# Patient Record
Sex: MALE | Race: BLACK OR AFRICAN AMERICAN | Employment: OTHER | URBAN - METROPOLITAN AREA
[De-identification: names, ages, dates, MRNs, and addresses within clinical notes are randomized per-mention and may not be internally consistent; named-entity substitution may affect disease eponyms.]

---

## 2017-10-24 ENCOUNTER — OFFICE VISIT (OUTPATIENT)
Dept: INTERNAL MEDICINE CLINIC | Facility: CLINIC | Age: 62
End: 2017-10-24

## 2017-10-24 VITALS
WEIGHT: 267 LBS | RESPIRATION RATE: 18 BRPM | HEIGHT: 78 IN | BODY MASS INDEX: 30.89 KG/M2 | DIASTOLIC BLOOD PRESSURE: 78 MMHG | TEMPERATURE: 98 F | SYSTOLIC BLOOD PRESSURE: 136 MMHG | HEART RATE: 69 BPM

## 2017-10-24 DIAGNOSIS — Z00.00 ENCOUNTER FOR MEDICAL EXAMINATION TO ESTABLISH CARE: ICD-10-CM

## 2017-10-24 DIAGNOSIS — R97.20 ELEVATED PSA: ICD-10-CM

## 2017-10-24 DIAGNOSIS — Z99.89 OSA ON CPAP: ICD-10-CM

## 2017-10-24 DIAGNOSIS — D12.6 TUBULAR ADENOMA OF COLON: ICD-10-CM

## 2017-10-24 DIAGNOSIS — I10 ESSENTIAL HYPERTENSION: Primary | ICD-10-CM

## 2017-10-24 DIAGNOSIS — Z72.0 TOBACCO ABUSE: ICD-10-CM

## 2017-10-24 DIAGNOSIS — G47.33 OSA ON CPAP: ICD-10-CM

## 2017-10-24 PROCEDURE — 99204 OFFICE O/P NEW MOD 45 MIN: CPT | Performed by: INTERNAL MEDICINE

## 2017-10-24 PROCEDURE — 99212 OFFICE O/P EST SF 10 MIN: CPT | Performed by: INTERNAL MEDICINE

## 2017-10-24 RX ORDER — LISINOPRIL 40 MG/1
40 TABLET ORAL
Refills: 5 | COMMUNITY
Start: 2017-09-22 | End: 2017-10-24

## 2017-10-24 RX ORDER — CYCLOBENZAPRINE HCL 10 MG
10 TABLET ORAL 3 TIMES DAILY PRN
COMMUNITY
End: 2017-10-24

## 2017-10-24 RX ORDER — TIZANIDINE 2 MG/1
2 TABLET ORAL NIGHTLY
Qty: 30 TABLET | Refills: 2 | Status: SHIPPED | OUTPATIENT
Start: 2017-10-24 | End: 2017-11-21

## 2017-10-24 RX ORDER — LISINOPRIL 40 MG/1
40 TABLET ORAL
Qty: 90 TABLET | Refills: 1 | Status: SHIPPED | OUTPATIENT
Start: 2017-10-24 | End: 2019-01-07

## 2017-10-24 RX ORDER — PANTOPRAZOLE SODIUM 40 MG/1
40 TABLET, DELAYED RELEASE ORAL
Refills: 3 | COMMUNITY
Start: 2017-07-31 | End: 2018-08-01

## 2017-10-24 NOTE — ASSESSMENT & PLAN NOTE
Patient transferring care from [facility]. Multiple medical issues have been discussed. Routine labs have been ordered. We will consider cutting back from cyclobenzaprine 10 mg 3 times daily to tizanidine 2 mg at bedtime as needed.

## 2017-10-24 NOTE — ASSESSMENT & PLAN NOTE
Last colonoscopy was normal in 2013 and was advised to repeat in 10 years. He did have some lower GI bleeding, was seen by Dr. Shae Caal. Anal biopsies were negative excepting for some inflammation. Patient has been otherwise asymptomatic.

## 2017-10-24 NOTE — PROGRESS NOTES
HPI:    Patient ID: Tiburcio Boast is a 60year old male. New patient, transfer of care. Seen by Dr. Jason Noland at [facility] about a year back.       Tiburcio Boast is a 61y male who presents for physical .  Needs paper for insurance filled out   F HTN-controlled   Elevated PSA --followed by urology  Poor diet --discussed   Stop smoking   Flu shot today     Follow up 3 months   Gabriel Ni MD       Last prostate biopsy as follows. FINAL DIAGNOSIS  A.  PROSTATE, RIGHT APEX; BIOPSY:  -BENIGN PROST include arthralgias and fatigue. Associated symptoms comments: Sleep apnea and on cpap. Nothing aggravates the symptoms. He has tried nothing for the symptoms. The treatment provided no relief. Colonoscopy done in 2010 and 2013.   Initial colonoscopy sh nightly. Disp: 30 tablet Rfl: 2   Pantoprazole Sodium 40 MG Oral Tab EC Take 40 mg by mouth once daily. Disp:  Rfl: 3     Allergies:No Known Allergies      10/24/17  1728   BP: 136/78   Pulse:    Resp:    Temp:      Body mass index is 30.85 kg/m².     PHYSI PSA, TOTAL AND FREE    Encounter for medical examination to establish care     Patient transferring care from [facility]. Multiple medical issues have been discussed. Routine labs have been ordered.   We will consider cutting back from cyclobenzaprine 10 m Tab 90 tablet 1      Sig: Take 1 tablet (40 mg total) by mouth once daily. TiZANidine HCl 2 MG Oral Tab 30 tablet 2      Sig: Take 1 tablet (2 mg total) by mouth nightly.            Imaging & Referrals:  UROLOGY - INTERNAL       #[id number]

## 2017-10-24 NOTE — PATIENT INSTRUCTIONS
Problem List Items Addressed This Visit        Unprioritized    Elevated PSA     Patient had a prostate biopsy repeated in 2015.  1 of the areas biopsied did show high-grade prostatic intraepithelial neoplasia.   Will have urology discussed this with the pa has been otherwise asymptomatic. Other Visit Diagnoses    None.

## 2017-10-24 NOTE — ASSESSMENT & PLAN NOTE
Blood pressure 136/78, pulse 69, temperature 98.2 °F (36.8 °C), temperature source Oral, resp. rate 18, height 6' 6\" (1.981 m), weight 267 lb (121.1 kg).      Blood pressure was initially elevated but seemed to improve after rest.  Continue on lisinopril a

## 2017-10-24 NOTE — ASSESSMENT & PLAN NOTE
Patient had a prostate biopsy repeated in 2015.  1 of the areas biopsied did show high-grade prostatic intraepithelial neoplasia. Will have urology discussed this with the patient–advised to follow-up with either Dr Vero Choi or Dr Nicole Weaver.   Recheck labs h

## 2017-11-11 ENCOUNTER — LAB ENCOUNTER (OUTPATIENT)
Dept: LAB | Facility: HOSPITAL | Age: 62
End: 2017-11-11
Attending: INTERNAL MEDICINE
Payer: COMMERCIAL

## 2017-11-11 DIAGNOSIS — I10 ESSENTIAL HYPERTENSION: ICD-10-CM

## 2017-11-11 DIAGNOSIS — R97.20 ELEVATED PSA: ICD-10-CM

## 2017-11-11 PROCEDURE — 82306 VITAMIN D 25 HYDROXY: CPT

## 2017-11-11 PROCEDURE — 84443 ASSAY THYROID STIM HORMONE: CPT

## 2017-11-11 PROCEDURE — 85025 COMPLETE CBC W/AUTO DIFF WBC: CPT

## 2017-11-11 PROCEDURE — 80061 LIPID PANEL: CPT

## 2017-11-11 PROCEDURE — 81003 URINALYSIS AUTO W/O SCOPE: CPT

## 2017-11-11 PROCEDURE — 36415 COLL VENOUS BLD VENIPUNCTURE: CPT

## 2017-11-11 PROCEDURE — 82607 VITAMIN B-12: CPT

## 2017-11-11 PROCEDURE — 84154 ASSAY OF PSA FREE: CPT

## 2017-11-11 PROCEDURE — 84153 ASSAY OF PSA TOTAL: CPT

## 2017-11-11 PROCEDURE — 80053 COMPREHEN METABOLIC PANEL: CPT

## 2017-11-21 ENCOUNTER — OFFICE VISIT (OUTPATIENT)
Dept: INTERNAL MEDICINE CLINIC | Facility: CLINIC | Age: 62
End: 2017-11-21

## 2017-11-21 VITALS
DIASTOLIC BLOOD PRESSURE: 78 MMHG | TEMPERATURE: 98 F | HEIGHT: 78 IN | BODY MASS INDEX: 31.35 KG/M2 | SYSTOLIC BLOOD PRESSURE: 136 MMHG | HEART RATE: 70 BPM | WEIGHT: 271 LBS

## 2017-11-21 DIAGNOSIS — G44.011 INTRACTABLE EPISODIC CLUSTER HEADACHE: ICD-10-CM

## 2017-11-21 DIAGNOSIS — I10 ESSENTIAL HYPERTENSION: Primary | ICD-10-CM

## 2017-11-21 DIAGNOSIS — E55.9 VITAMIN D DEFICIENCY: ICD-10-CM

## 2017-11-21 DIAGNOSIS — R97.20 ELEVATED PSA: ICD-10-CM

## 2017-11-21 DIAGNOSIS — Z72.0 TOBACCO ABUSE: ICD-10-CM

## 2017-11-21 DIAGNOSIS — Z82.49 FAMILY HISTORY OF BRAIN ANEURYSM: ICD-10-CM

## 2017-11-21 PROBLEM — G44.009 CLUSTER HEADACHES: Status: ACTIVE | Noted: 2017-11-21

## 2017-11-21 PROCEDURE — 99212 OFFICE O/P EST SF 10 MIN: CPT | Performed by: INTERNAL MEDICINE

## 2017-11-21 PROCEDURE — 99214 OFFICE O/P EST MOD 30 MIN: CPT | Performed by: INTERNAL MEDICINE

## 2017-11-21 RX ORDER — TIZANIDINE 2 MG/1
2 TABLET ORAL NIGHTLY
Qty: 90 TABLET | Refills: 2 | Status: SHIPPED | OUTPATIENT
Start: 2017-11-21 | End: 2018-08-23

## 2017-11-21 RX ORDER — ERGOCALCIFEROL 1.25 MG/1
50000 CAPSULE ORAL WEEKLY
Qty: 12 CAPSULE | Refills: 0 | Status: SHIPPED | OUTPATIENT
Start: 2017-11-21 | End: 2017-12-21

## 2017-11-22 NOTE — ASSESSMENT & PLAN NOTE
Blood pressure 136/78, pulse 70, temperature 98.2 °F (36.8 °C), temperature source Oral, height 6' 6\" (1.981 m), weight 271 lb (122.9 kg). Blood pressures seem to be elevated upon initial evaluation but seems much improved on recheck.   Recent labs kid

## 2017-11-22 NOTE — ASSESSMENT & PLAN NOTE
Discussed tapering down smoking. Patient is currently down to 8 cigarettes per day and he plans to gradually cut back and quit. He does not want to try educations for help at this time.

## 2017-11-22 NOTE — ASSESSMENT & PLAN NOTE
Father with a brain aneurysm at 36years of age initially and had a second episode at 60. Patient himself does smoke and has a history of hypertension. Patient has had episodic headaches which sometimes is cluster like and last for about 2 months.   He h

## 2017-11-22 NOTE — PROGRESS NOTES
HPI:    Patient ID: Enoch Calvo is a 60year old male. All labs discussed      Hypertension   This is a chronic problem. The current episode started more than 1 year ago.  The problem has been gradually improving (has been on lisinopril at 40 mg 1 pain quality is not similar to prior headaches. The quality of the pain is described as pulsating and sharp. The pain is at a severity of 5/10. The pain is moderate.  Pertinent negatives include no abnormal behavior, dizziness, numbness, scalp tenderness, s ear normal.   Nose: Nose normal.   Mouth/Throat: Oropharynx is clear and moist. No oropharyngeal exudate. Eyes: Conjunctivae and EOM are normal. Pupils are equal, round, and reactive to light. Neck: Normal range of motion. Neck supple. No JVD present. initial evaluation but seems much improved on recheck. Recent labs kidney function tests look normal within normal EGFR. Continue on lisinopril at 40 mg once daily. Given history of smoking in addition will obtain a treadmill stress test as a baseline. Referrals:  CARD TREADMILL STRESS, ADULT (CPT=93017)  [facility] BRAIN (ETS=[id number])       SD#[id number]

## 2017-11-22 NOTE — ASSESSMENT & PLAN NOTE
Episodic intractable headaches which last often for few weeks-symptoms off and on for the past 6-7 months. Tends to resolve after that. He has not had any lightheadedness or dizziness.   However he does have a family history of a brain aneurysm in his fat

## 2017-11-22 NOTE — ASSESSMENT & PLAN NOTE
Pretty significant vitamin D deficiency–levels at 7.0.   Advised to start on vitamin D as a prescription–50,000 units once a week for 12 weeks and then continue on an over-the-counter vitamin D at 2000 units once daily  Recheck labs for vitamin D levels in

## 2017-11-22 NOTE — ASSESSMENT & PLAN NOTE
PSA levels are quite high. He did have a prostate biopsy in 20 15–1 of the areas did show high-grade prostatic intraepithelial neoplasia. Recheck PSA levels did show significant elevation compared to the last test done about 2 years back.   He has been re

## 2017-11-22 NOTE — PATIENT INSTRUCTIONS
Problem List Items Addressed This Visit        Unprioritized    Cluster headaches     Episodic intractable headaches which last often for few weeks-symptoms off and on for the past 6-7 months. Tends to resolve after that.   He has not had any lightheadedne BRAIN (CPT=70544)    Tobacco abuse     Discussed tapering down smoking. Patient is currently down to 8 cigarettes per day and he plans to gradually cut back and quit. He does not want to try educations for help at this time.          Vitamin D deficiency

## 2017-11-27 ENCOUNTER — HOSPITAL ENCOUNTER (OUTPATIENT)
Age: 62
Discharge: HOME | End: 2017-11-27
Attending: FAMILY MEDICINE
Payer: COMMERCIAL

## 2017-11-27 ENCOUNTER — APPOINTMENT (OUTPATIENT)
Dept: GENERAL RADIOLOGY | Age: 62
End: 2017-11-27
Attending: EMERGENCY MEDICINE
Payer: COMMERCIAL

## 2017-11-27 VITALS
HEART RATE: 69 BPM | DIASTOLIC BLOOD PRESSURE: 63 MMHG | OXYGEN SATURATION: 97 % | SYSTOLIC BLOOD PRESSURE: 149 MMHG | TEMPERATURE: 99 F | RESPIRATION RATE: 18 BRPM

## 2017-11-27 DIAGNOSIS — M70.61 TROCHANTERIC BURSITIS OF RIGHT HIP: Primary | ICD-10-CM

## 2017-11-27 PROCEDURE — 73502 X-RAY EXAM HIP UNI 2-3 VIEWS: CPT | Performed by: EMERGENCY MEDICINE

## 2017-11-27 PROCEDURE — 99203 OFFICE O/P NEW LOW 30 MIN: CPT

## 2017-11-27 PROCEDURE — 99213 OFFICE O/P EST LOW 20 MIN: CPT

## 2017-11-27 RX ORDER — NAPROXEN 500 MG/1
500 TABLET ORAL 2 TIMES DAILY WITH MEALS
Qty: 28 TABLET | Refills: 0 | Status: SHIPPED | OUTPATIENT
Start: 2017-11-27 | End: 2017-12-11

## 2017-11-27 RX ORDER — MELATONIN
1000 DAILY
COMMUNITY
End: 2019-08-07

## 2017-11-27 NOTE — ED NOTES
Pt discharged home , prescriptions electronically sent to the pharmacy, pt instructed to follow up with his primary md if symptoms worsen

## 2017-11-27 NOTE — ED INITIAL ASSESSMENT (HPI)
Pt here with complaints of right hip pain, pt states he started developing pain last wed , pt states today he had to leave work because the pain was unbearable , pt denies any trauma, pt states he was doing some antoni[place] work 2 weeks ago and that's when it

## 2017-11-27 NOTE — ED PROVIDER NOTES
Patient presents with:  Musculoskeletal Problem    HPI:     Lluvia Andres is a 60year old male who presents with chief complaint of right hip pain.   X 1 WEEK  Dull to sharp  Moderate to severe pain  No radiation  Worse with activity  Better with rest today and agreed except as otherwise stated in HPI.         Physical Exam:     Findings:    /63   Pulse 69   Temp 98.7 °F (37.1 °C) (Oral)   Resp 18   SpO2 97%   General appearance: alert, appears stated age and cooperative  Musculoskeletal examinatio

## 2017-12-11 ENCOUNTER — HOSPITAL ENCOUNTER (OUTPATIENT)
Dept: MRI IMAGING | Facility: HOSPITAL | Age: 62
Discharge: HOME | End: 2017-12-11
Attending: INTERNAL MEDICINE
Payer: COMMERCIAL

## 2017-12-11 DIAGNOSIS — G44.011 INTRACTABLE EPISODIC CLUSTER HEADACHE: ICD-10-CM

## 2017-12-11 DIAGNOSIS — Z82.49 FAMILY HISTORY OF BRAIN ANEURYSM: ICD-10-CM

## 2017-12-11 PROCEDURE — 70544 MR ANGIOGRAPHY HEAD W/O DYE: CPT | Performed by: INTERNAL MEDICINE

## 2018-01-11 ENCOUNTER — OFFICE VISIT (OUTPATIENT)
Dept: SURGERY | Facility: CLINIC | Age: 63
End: 2018-01-11

## 2018-01-11 VITALS
HEIGHT: 76 IN | HEART RATE: 82 BPM | DIASTOLIC BLOOD PRESSURE: 83 MMHG | SYSTOLIC BLOOD PRESSURE: 145 MMHG | WEIGHT: 270 LBS | TEMPERATURE: 98 F | BODY MASS INDEX: 32.88 KG/M2

## 2018-01-11 DIAGNOSIS — R97.20 ELEVATED PSA: Primary | ICD-10-CM

## 2018-01-11 PROCEDURE — 99244 OFF/OP CNSLTJ NEW/EST MOD 40: CPT | Performed by: UROLOGY

## 2018-01-11 PROCEDURE — 99212 OFFICE O/P EST SF 10 MIN: CPT | Performed by: UROLOGY

## 2018-01-11 NOTE — PROGRESS NOTES
SUBJECTIVE:  Marvin Han is a 60year old male who presents for a consultation at the request of, and a copy of this note will be sent to, Dr. Donna Zuniga, for evaluation of  elevated PSA. He states that the problem is unchanged.  Symptoms include chronic heartburn or indigestion, abdominal pains, bloody or tarry stools. GENERAL: Denies:  weight gain, weight loss, fever, night sweats, bone pain, malaise and fatigue. Positive for:  None.   ALl other ROS reviewed and otherwise normal.    OBJECTIVE:  /83 any clinically important nodules can be detected. He remains apprehensive about both of those in spite of reviewing pros and cons of aggressive prostate cancer screening.   He and I agreed based on his wishes to have him follow-up in 6 weeks with a repeat

## 2018-02-21 ENCOUNTER — APPOINTMENT (OUTPATIENT)
Dept: LAB | Facility: HOSPITAL | Age: 63
End: 2018-02-21
Attending: UROLOGY
Payer: COMMERCIAL

## 2018-02-21 DIAGNOSIS — R97.20 ELEVATED PSA: ICD-10-CM

## 2018-02-21 LAB — PSA SERPL-MCNC: 23 NG/ML (ref 0–4)

## 2018-02-21 PROCEDURE — 36415 COLL VENOUS BLD VENIPUNCTURE: CPT

## 2018-02-21 PROCEDURE — 84153 ASSAY OF PSA TOTAL: CPT

## 2018-02-22 ENCOUNTER — OFFICE VISIT (OUTPATIENT)
Dept: SURGERY | Facility: CLINIC | Age: 63
End: 2018-02-22

## 2018-02-22 ENCOUNTER — TELEPHONE (OUTPATIENT)
Dept: SURGERY | Facility: CLINIC | Age: 63
End: 2018-02-22

## 2018-02-22 VITALS
HEIGHT: 76 IN | WEIGHT: 270 LBS | DIASTOLIC BLOOD PRESSURE: 81 MMHG | TEMPERATURE: 99 F | HEART RATE: 67 BPM | SYSTOLIC BLOOD PRESSURE: 136 MMHG | BODY MASS INDEX: 32.88 KG/M2

## 2018-02-22 DIAGNOSIS — Z01.818 PREOP EXAMINATION: ICD-10-CM

## 2018-02-22 DIAGNOSIS — R97.20 ELEVATED PSA: Primary | ICD-10-CM

## 2018-02-22 PROCEDURE — 99213 OFFICE O/P EST LOW 20 MIN: CPT | Performed by: UROLOGY

## 2018-02-22 PROCEDURE — 99212 OFFICE O/P EST SF 10 MIN: CPT | Performed by: UROLOGY

## 2018-02-22 NOTE — TELEPHONE ENCOUNTER
Patient seen in office, will call Alex at [address] to schedule saturation prostate biopsy, informed patient I will call with time and date once confirmed, patient stated verbally understood.

## 2018-02-22 NOTE — PROGRESS NOTES
Shavonne Yao is a 60year old male. HPI:   Patient presents with:  elevated psa: Follow up to discuss elevated PSA      56-year-old -American male in follow-up to a previous visit January 11, 2018.   He has a chronic history of elevated PSA, Allergies      ROS:       PHYSICAL EXAM:        ASSESSMENT/PLAN:   Assessment   Preop examination  Elevated psa  (primary encounter diagnosis)    Reviewed findings at length again with patient.   I recommended strongly consideration of saturation biopsy to

## 2018-02-23 NOTE — TELEPHONE ENCOUNTER
L/m on V/m informing patient of confirmed time and date. Spoke with ermelinda faxed over patient' info. , for saturation bx.

## 2018-02-28 ENCOUNTER — TELEPHONE (OUTPATIENT)
Dept: SURGERY | Facility: CLINIC | Age: 63
End: 2018-02-28

## 2018-02-28 NOTE — TELEPHONE ENCOUNTER
Alex Cordova from [facility] called, stated due to patient' insurance coverage only 18 cores will be covered for saturation.     Thanks

## 2018-03-06 ENCOUNTER — TELEPHONE (OUTPATIENT)
Dept: SURGERY | Facility: CLINIC | Age: 63
End: 2018-03-06

## 2018-03-13 ENCOUNTER — TELEPHONE (OUTPATIENT)
Dept: SURGERY | Facility: CLINIC | Age: 63
End: 2018-03-13

## 2018-03-13 ENCOUNTER — LAB ENCOUNTER (OUTPATIENT)
Dept: LAB | Facility: HOSPITAL | Age: 63
End: 2018-03-13
Attending: UROLOGY
Payer: COMMERCIAL

## 2018-03-13 DIAGNOSIS — Z01.818 PREOP EXAMINATION: ICD-10-CM

## 2018-03-13 LAB
ANION GAP SERPL CALC-SCNC: 7 MMOL/L (ref 0–18)
BASOPHILS # BLD: 0.1 K/UL (ref 0–0.2)
BASOPHILS NFR BLD: 1 %
BUN SERPL-MCNC: 11 MG/DL (ref 8–20)
BUN/CREAT SERPL: 11.6 (ref 10–20)
CALCIUM SERPL-MCNC: 8.7 MG/DL (ref 8.5–10.5)
CHLORIDE SERPL-SCNC: 105 MMOL/L (ref 95–110)
CO2 SERPL-SCNC: 26 MMOL/L (ref 22–32)
CREAT SERPL-MCNC: 0.95 MG/DL (ref 0.5–1.5)
EOSINOPHIL # BLD: 0.3 K/UL (ref 0–0.7)
EOSINOPHIL NFR BLD: 3 %
ERYTHROCYTE [DISTWIDTH] IN BLOOD BY AUTOMATED COUNT: 13.9 % (ref 11–15)
GLUCOSE SERPL-MCNC: 100 MG/DL (ref 70–99)
HCT VFR BLD AUTO: 47.9 % (ref 41–52)
HGB BLD-MCNC: 16.4 G/DL (ref 13.5–17.5)
LYMPHOCYTES # BLD: 3.2 K/UL (ref 1–4)
LYMPHOCYTES NFR BLD: 31 %
MCH RBC QN AUTO: 32.2 PG (ref 27–32)
MCHC RBC AUTO-ENTMCNC: 34.3 G/DL (ref 32–37)
MCV RBC AUTO: 93.8 FL (ref 80–100)
MONOCYTES # BLD: 0.7 K/UL (ref 0–1)
MONOCYTES NFR BLD: 7 %
NEUTROPHILS # BLD AUTO: 5.8 K/UL (ref 1.8–7.7)
NEUTROPHILS NFR BLD: 57 %
OSMOLALITY UR CALC.SUM OF ELEC: 285 MOSM/KG (ref 275–295)
PLATELET # BLD AUTO: 151 K/UL (ref 140–400)
PMV BLD AUTO: 12.2 FL (ref 7.4–10.3)
POTASSIUM SERPL-SCNC: 3.5 MMOL/L (ref 3.3–5.1)
RBC # BLD AUTO: 5.11 M/UL (ref 4.5–5.9)
SODIUM SERPL-SCNC: 138 MMOL/L (ref 136–144)
WBC # BLD AUTO: 10.2 K/UL (ref 4–11)

## 2018-03-13 PROCEDURE — 85025 COMPLETE CBC W/AUTO DIFF WBC: CPT

## 2018-03-13 PROCEDURE — 80048 BASIC METABOLIC PNL TOTAL CA: CPT

## 2018-03-13 PROCEDURE — 36415 COLL VENOUS BLD VENIPUNCTURE: CPT

## 2018-03-13 NOTE — TELEPHONE ENCOUNTER
Called patient and informed him that we have not received any records from [facility] he will make contact with them to find out why we have not received them

## 2018-03-13 NOTE — TELEPHONE ENCOUNTER
Spoke with patient informed that labs need to be done. Patient informed will have them done today. Spoke with Akosua Hardy at [facility], will fax patient' EKG over.

## 2018-03-20 ENCOUNTER — TELEPHONE (OUTPATIENT)
Dept: SURGERY | Facility: CLINIC | Age: 63
End: 2018-03-20

## 2018-03-20 NOTE — TELEPHONE ENCOUNTER
I called and notify patient of positive prostate saturation biopsy results.   This shows following findings:  Right anterior apex Phil 4+480% of tissue 2 of 2 cores involved with perineural invasion present, right posterior apex [place] 4+550% of tissue

## 2018-03-22 ENCOUNTER — TELEPHONE (OUTPATIENT)
Dept: SURGERY | Facility: CLINIC | Age: 63
End: 2018-03-22

## 2018-03-22 NOTE — TELEPHONE ENCOUNTER
Spoke with pt and asked if he could move up his appt tomorrow from 12 noon to 10:50 am and he agreed and I changed the appt on the schd.

## 2018-03-23 ENCOUNTER — OFFICE VISIT (OUTPATIENT)
Dept: SURGERY | Facility: CLINIC | Age: 63
End: 2018-03-23

## 2018-03-23 VITALS
DIASTOLIC BLOOD PRESSURE: 83 MMHG | HEIGHT: 76 IN | HEART RATE: 66 BPM | BODY MASS INDEX: 32.88 KG/M2 | WEIGHT: 270 LBS | TEMPERATURE: 98 F | RESPIRATION RATE: 16 BRPM | SYSTOLIC BLOOD PRESSURE: 148 MMHG

## 2018-03-23 DIAGNOSIS — C61 PROSTATE CANCER (HCC): Primary | ICD-10-CM

## 2018-03-23 PROCEDURE — 99215 OFFICE O/P EST HI 40 MIN: CPT | Performed by: UROLOGY

## 2018-03-23 PROCEDURE — 99212 OFFICE O/P EST SF 10 MIN: CPT | Performed by: UROLOGY

## 2018-03-23 NOTE — PROGRESS NOTES
Buster June is a 60year old male.     HPI:   Patient presents with:  Prostate Cancer    25-year-old male diagnosed recently on saturation biopsy with clinical T1c [place] 4+5 right sided with multiple positive cores PSA 23 February 2018 prostate can management of this patient's prostate cancer including active surveillance, definitive treatment with prostatectomy usually performed with robotic/laparoscopic assistance, radiation therapy either in the form of external beam radiation therapy or radioacti dysfunction, gross hematuria or hematochezia. I spent well over 45 minutes discussing these treatment options with the patient, answering questions well over half the time spent in face-to-face discussion. I had a long discussion with the patient.   In

## 2018-03-29 ENCOUNTER — HOSPITAL ENCOUNTER (OUTPATIENT)
Dept: NUCLEAR MEDICINE | Facility: HOSPITAL | Age: 63
Discharge: HOME | End: 2018-03-29
Attending: UROLOGY
Payer: COMMERCIAL

## 2018-03-29 ENCOUNTER — HOSPITAL ENCOUNTER (OUTPATIENT)
Dept: CT IMAGING | Facility: HOSPITAL | Age: 63
Discharge: HOME | End: 2018-03-29
Attending: UROLOGY
Payer: COMMERCIAL

## 2018-03-29 DIAGNOSIS — C61 PROSTATE CANCER (HCC): ICD-10-CM

## 2018-03-29 PROCEDURE — 78306 BONE IMAGING WHOLE BODY: CPT | Performed by: UROLOGY

## 2018-03-29 PROCEDURE — 82565 ASSAY OF CREATININE: CPT

## 2018-03-29 PROCEDURE — 74177 CT ABD & PELVIS W/CONTRAST: CPT | Performed by: UROLOGY

## 2018-04-01 ENCOUNTER — APPOINTMENT (OUTPATIENT)
Dept: RADIATION ONCOLOGY | Facility: HOSPITAL | Age: 63
End: 2018-04-01
Attending: RADIOLOGY
Payer: COMMERCIAL

## 2018-04-11 ENCOUNTER — OFFICE VISIT (OUTPATIENT)
Dept: SURGERY | Facility: CLINIC | Age: 63
End: 2018-04-11

## 2018-04-11 ENCOUNTER — TELEPHONE (OUTPATIENT)
Dept: SURGERY | Facility: CLINIC | Age: 63
End: 2018-04-11

## 2018-04-11 VITALS
HEART RATE: 69 BPM | BODY MASS INDEX: 32.88 KG/M2 | SYSTOLIC BLOOD PRESSURE: 145 MMHG | DIASTOLIC BLOOD PRESSURE: 78 MMHG | TEMPERATURE: 98 F | WEIGHT: 270 LBS | HEIGHT: 76 IN

## 2018-04-11 DIAGNOSIS — C61 PROSTATE CANCER (HCC): Primary | ICD-10-CM

## 2018-04-11 PROCEDURE — 99214 OFFICE O/P EST MOD 30 MIN: CPT | Performed by: UROLOGY

## 2018-04-11 PROCEDURE — 96402 CHEMO HORMON ANTINEOPL SQ/IM: CPT | Performed by: UROLOGY

## 2018-04-11 PROCEDURE — 99212 OFFICE O/P EST SF 10 MIN: CPT | Performed by: UROLOGY

## 2018-04-11 RX ORDER — BICALUTAMIDE 50 MG/1
50 TABLET, FILM COATED ORAL NIGHTLY
Qty: 14 TABLET | Refills: 0 | Status: SHIPPED | OUTPATIENT
Start: 2018-04-11 | End: 2018-05-09 | Stop reason: ALTCHOICE

## 2018-04-11 NOTE — PROGRESS NOTES
Buster June is a 60year old male. HPI:   Patient presents with:  Prostate Cancer: Discuss treatment options.      21-year-old male with high-risk prostate cancer clinical T1c Phil 4+5 right posterior apex right posterior base and 4+4 at the ri Cans of beer: 2 per week     Comment: social       Medications (Active prior to today's visit):    Current Outpatient Prescriptions:  bicalutamide 50 MG Oral Tab Take 1 tablet (50 mg total) by mouth nightly.  Disp: 14 tablet Rfl: 0   Vitamin B-12 1000 MCG

## 2018-04-11 NOTE — PATIENT INSTRUCTIONS
1)please obtain a chest CT scan in the next 1-2 weeks. 2)Please stat on Bicalutamide (casodex) pill x 2 weeks  3)please make an appointment to see Dr. Sejal Estevez in radiation oncology in the next 1-2 weeks.   4)please discuss with Dr. Kumar Gilmore

## 2018-04-13 ENCOUNTER — PATIENT MESSAGE (OUTPATIENT)
Dept: INTERNAL MEDICINE CLINIC | Facility: CLINIC | Age: 63
End: 2018-04-13

## 2018-04-14 NOTE — TELEPHONE ENCOUNTER
From: Nolvia Valle  To: Yadiel Castillo MD  Sent: 4/13/2018 5:20 PM CDT  Subject: Visit Millicent Arrieta referred me to Dr. Sergio Rojas he did the procedure and found cancer I would like to see you as soon as possible to discuss my options

## 2018-04-23 ENCOUNTER — HOSPITAL ENCOUNTER (OUTPATIENT)
Dept: CT IMAGING | Facility: HOSPITAL | Age: 63
Discharge: HOME | End: 2018-04-23
Attending: UROLOGY
Payer: COMMERCIAL

## 2018-04-23 DIAGNOSIS — C61 PROSTATE CANCER (HCC): ICD-10-CM

## 2018-04-23 PROCEDURE — 71260 CT THORAX DX C+: CPT | Performed by: UROLOGY

## 2018-04-23 PROCEDURE — 82565 ASSAY OF CREATININE: CPT

## 2018-04-26 ENCOUNTER — HOSPITAL ENCOUNTER (OUTPATIENT)
Dept: ULTRASOUND IMAGING | Facility: HOSPITAL | Age: 63
Discharge: HOME | End: 2018-04-26
Attending: INTERNAL MEDICINE
Payer: COMMERCIAL

## 2018-04-26 DIAGNOSIS — N28.1 KIDNEY CYSTS: ICD-10-CM

## 2018-04-26 PROCEDURE — 76770 US EXAM ABDO BACK WALL COMP: CPT | Performed by: INTERNAL MEDICINE

## 2018-04-27 ENCOUNTER — OFFICE VISIT (OUTPATIENT)
Dept: RADIATION ONCOLOGY | Facility: HOSPITAL | Age: 63
End: 2018-04-27
Attending: RADIOLOGY
Payer: COMMERCIAL

## 2018-04-27 VITALS
RESPIRATION RATE: 18 BRPM | WEIGHT: 266.81 LBS | SYSTOLIC BLOOD PRESSURE: 158 MMHG | DIASTOLIC BLOOD PRESSURE: 73 MMHG | HEART RATE: 70 BPM | HEIGHT: 76 IN | BODY MASS INDEX: 32.49 KG/M2

## 2018-04-27 DIAGNOSIS — C61 PROSTATE CANCER (HCC): Primary | ICD-10-CM

## 2018-04-27 PROCEDURE — 99212 OFFICE O/P EST SF 10 MIN: CPT

## 2018-04-27 NOTE — PROGRESS NOTES
Primary language:  English  Language line required?  no  Comprehension Ability:  excellent  Able to read?  yes  Able to write? yes  Communication tools:  na  Patient's ability to learn:  excellent  Readiness to learn:   Motivated  Learning preferences:  Jorge Luis Rowell for diarrhea    Problems related to:    Radiation therapy    Interventions:  Monitor bowel function  Administer antidiarrheals  Instruct patient/family regarding medications  Avoid irritating foods  Encourage fluids  Instruct patient/family on low fat/high

## 2018-04-27 NOTE — PROGRESS NOTES
Nursing Consultation Note  Patient: Felisa Higgins  YOB: 1955  Age: 60year old  Radiation Oncologist: Dr. Abram Lin  Referring Physician: Raudel Watkins  Diagnosis:No diagnosis found.   Consult Date: 4/27/2018      Chemotherapy: [facility] - [address] [phone], [phone]  [place] [address]  Phone: [phone] Fax: [phone]      Past Medical History:   Diagnosis Date   • COPD (chronic obstructive pulmonary disease) ( provided pt with a side effect prostate form and self care instructions to be followed once radiation begins. Side effects include, fatigue, urinary, bowel changes, skin changes. Discussed the radiation process.  Plan to wait 2 months from pt's 1st injectio

## 2018-05-01 ENCOUNTER — APPOINTMENT (OUTPATIENT)
Dept: RADIATION ONCOLOGY | Facility: HOSPITAL | Age: 63
End: 2018-05-01
Attending: RADIOLOGY
Payer: COMMERCIAL

## 2018-05-02 NOTE — CONSULTS
[facility]    PATIENT'S NAME: NATHNA [address] [facility]   RADIATION ONCOLOGIST: Maksim Serra.  Ernestina Zhu MD   PATIENT ACCOUNT #: [de-identified] LOCATION: [address] [place] [address] RECORD #: [id number] YOB: 1955   CONSULTATION DATE: 04/28/2018 his primary care physician to determine whether or not the MRI of the abdomen was reasonable. A CT scan of the chest was done to evaluate the left scapular finding and this found no evidence of any metastasis.   A kidney cyst was also noted on the CT scan, pleasant, cooperative, alert, awake, and oriented x3. He is in no acute distress. He has a pain score of 0 and an ECOG performance score of 0. VITAL SIGNS:  Blood pressure 158/73, pulse 70, respiratory rate 18, and he is afebrile.   His weight is 266 shira would take him to a dose of 8100 cGy, and he should continue on hormones for a period of between 6 to 24 months. In his case, however, I likely would have him on hormones close to 2 full years but would defer this decision to Dr. Trudy Sullivan.   If the disease d be only having 5 weeks of treatment in this case, I would not expect any of these side effects to be particularly severe.   The brachytherapy component has its own side effects and I deferred a discussion regarding these, as he will not be having this done not start any radiation until he has been on his hormonal therapy for 2 months. At that point, if he is a candidate for brachytherapy, we will have him meet with Dr. New Velasquez to discuss that treatment and he can proceed if that should be his wish.   Juliet

## 2018-05-03 ENCOUNTER — TELEPHONE (OUTPATIENT)
Dept: OTHER | Age: 63
End: 2018-05-03

## 2018-05-03 ENCOUNTER — PATIENT MESSAGE (OUTPATIENT)
Dept: INTERNAL MEDICINE CLINIC | Facility: CLINIC | Age: 63
End: 2018-05-03

## 2018-05-03 NOTE — TELEPHONE ENCOUNTER
Reviewed pt's request for sleep study order as noted below. Pt states he was diagnosed with sleep apnea in 2009, needed to use a CPAP machine and that machine broke. Pt has been using his wife's CPAP machine and it is helping a little.  Pt hs been snoring a

## 2018-05-03 NOTE — TELEPHONE ENCOUNTER
----- Message from Opal Veloz sent at 5/3/2018  1:36 PM CDT -----  Regarding: Referral Request  Contact: [phone]  HI DR. MIRAMONTES  I AM REALLY HAVE TROUBLE SLEEPING AND STAYING ASLEEP.   I WOULD LIKE TO REQUEST A SLEEP STUDY ASAP  I HAD ONE MAYBE

## 2018-05-03 NOTE — TELEPHONE ENCOUNTER
From: Manuel Anderson  To: Haydee Maldonado MD  Sent: 5/3/2018 1:36 PM CDT  Subject: Referral Request    HI DR. MIRAMONTES  I AM REALLY HAVE TROUBLE SLEEPING AND STAYING ASLEEP.  I WOULD LIKE TO REQUEST A SLEEP STUDY ASAP  I HAD ONE MAYBE 9 YEARS AGO AND WAS D

## 2018-05-05 NOTE — TELEPHONE ENCOUNTER
The sleep study is heavily scrutinised. I will place the order ut insurance will refuse if not accompanied by an ov and proper documentation,    Please set up an appt -use sds visit-sleep eval as disgnosis  There are quite a few available in the coming week

## 2018-05-05 NOTE — TELEPHONE ENCOUNTER
Spoke with patient and relayed RIDDHI message below--patient verbalizes understanding and agreement. Appt made for Wednesday, 5/09/18 at 0730 with RIDDHI. No further questions/concerns at this time.

## 2018-05-09 ENCOUNTER — OFFICE VISIT (OUTPATIENT)
Dept: INTERNAL MEDICINE CLINIC | Facility: CLINIC | Age: 63
End: 2018-05-09

## 2018-05-09 VITALS
HEIGHT: 76 IN | WEIGHT: 264.88 LBS | SYSTOLIC BLOOD PRESSURE: 136 MMHG | RESPIRATION RATE: 22 BRPM | DIASTOLIC BLOOD PRESSURE: 78 MMHG | TEMPERATURE: 99 F | HEART RATE: 68 BPM | BODY MASS INDEX: 32.26 KG/M2 | OXYGEN SATURATION: 95 %

## 2018-05-09 DIAGNOSIS — I10 ESSENTIAL HYPERTENSION: ICD-10-CM

## 2018-05-09 DIAGNOSIS — E55.9 VITAMIN D DEFICIENCY: ICD-10-CM

## 2018-05-09 DIAGNOSIS — G47.33 OBSTRUCTIVE SLEEP APNEA SYNDROME: Primary | ICD-10-CM

## 2018-05-09 DIAGNOSIS — B07.0 PLANTAR WART: ICD-10-CM

## 2018-05-09 PROCEDURE — 99212 OFFICE O/P EST SF 10 MIN: CPT | Performed by: INTERNAL MEDICINE

## 2018-05-09 PROCEDURE — 99214 OFFICE O/P EST MOD 30 MIN: CPT | Performed by: INTERNAL MEDICINE

## 2018-05-09 RX ORDER — IBUPROFEN 200 MG
600 TABLET ORAL EVERY 8 HOURS PRN
COMMUNITY
End: 2019-09-19

## 2018-05-09 NOTE — ASSESSMENT & PLAN NOTE
Plantar wart on the lateral aspect of the right foot–long-standing but currently painful and difficulty stepping on the foot. He does have flat feet and some calluses in the area.   He is being referred to podiatry–Dr. Dalia Kirkland for an evaluation, [phone]

## 2018-05-09 NOTE — PROGRESS NOTES
HPI:    Patient ID: Alecia Pereira is a 60year old male. Tired   This is a chronic problem. The current episode started more than 1 year ago. The problem occurs intermittently.  The problem has been waxing and waning (hx of sleep apnea,had a cpap-br treatment provides moderate improvement. Compliance problems include diet and exercise. Review of Systems   Constitutional: Positive for fatigue. HENT: Negative. Eyes: Negative. Respiratory: Negative. Negative for shortness of breath.     Ca intact distal pulses. Pulmonary/Chest: Effort normal and breath sounds normal.   Abdominal: Soft. Bowel sounds are normal.   Musculoskeletal: Normal range of motion. Lymphadenopathy:     He has no cervical adenopathy.    Neurological: He is alert and o extremely tired and unable to complete his daytime obligations. [place] sleepiness scale    0–   would never does, 1–    slight chance of dozing, 2–  moderate chance of dozing, 3–   high chance of dozing.     Sitting and reading–            moderate chance

## 2018-05-09 NOTE — ASSESSMENT & PLAN NOTE
History of obstructive sleep apnea and has been on the CPAP. His last study was completed in 2009–no available records at this time. He has been trying to use his wife's machine without significant improvement in fatigue.      He has been snoring at night

## 2018-05-09 NOTE — ASSESSMENT & PLAN NOTE
Blood pressure 136/78, pulse 68, temperature 98.5 °F (36.9 °C), temperature source Oral, resp. rate 22, height 6' 4\" (1.93 m), weight 264 lb 14.4 oz (120.2 kg), SpO2 95 %. Initially recorded blood pressure was elevated but upon recheck much improved.

## 2018-05-09 NOTE — ASSESSMENT & PLAN NOTE
Patient has been given vitamin D supplementation in November.   He is due for recheck labs which have been ordered

## 2018-05-09 NOTE — PATIENT INSTRUCTIONS
Problem List Items Addressed This Visit        Unprioritized    Essential hypertension     Blood pressure 136/78, pulse 68, temperature 98.5 °F (36.9 °C), temperature source Oral, resp.  rate 22, height 6' 4\" (1.93 m), weight 264 lb 14.4 oz (120.2 kg), SpO 3.  Lying down to rest during the day when circumstances permit–            high chance of dozing–    3.    Sitting and talking to someone–            slight chance of dozing–   1  In a car, while stopped for a few minutes in traffic–           slight chanc

## 2018-05-21 ENCOUNTER — HOSPITAL ENCOUNTER (OUTPATIENT)
Dept: CV DIAGNOSTICS | Facility: HOSPITAL | Age: 63
Discharge: HOME | End: 2018-05-21
Attending: INTERNAL MEDICINE
Payer: COMMERCIAL

## 2018-05-21 DIAGNOSIS — I10 ESSENTIAL HYPERTENSION: ICD-10-CM

## 2018-05-21 PROCEDURE — 93018 CV STRESS TEST I&R ONLY: CPT | Performed by: INTERNAL MEDICINE

## 2018-05-21 PROCEDURE — 93016 CV STRESS TEST SUPVJ ONLY: CPT | Performed by: INTERNAL MEDICINE

## 2018-05-21 PROCEDURE — 93017 CV STRESS TEST TRACING ONLY: CPT | Performed by: INTERNAL MEDICINE

## 2018-05-24 ENCOUNTER — TELEPHONE (OUTPATIENT)
Dept: SURGERY | Facility: CLINIC | Age: 63
End: 2018-05-24

## 2018-05-24 NOTE — TELEPHONE ENCOUNTER
Phoned pt and received voice mail. [email or web address]. Clinic call back number provided. ronald Curry, per [facility] message below. Thank you.

## 2018-05-24 NOTE — TELEPHONE ENCOUNTER
Please have pt see me for a f/u in about 4 weeks to discuss the ct chest and a f/u scan needed  The nodule in the lung seems small and so no need to worry-but will need a f/u

## 2018-05-24 NOTE — TELEPHONE ENCOUNTER
----- Message from Cal Sun MD sent at 5/24/2018  7:41 AM CDT -----  Staff please call this patient.   Let them know that I reviewed his CT of the chest.  There are no significant abnormalities involving the shoulder blade on the right as suggested by

## 2018-06-08 ENCOUNTER — HOSPITAL ENCOUNTER (OUTPATIENT)
Dept: MRI IMAGING | Facility: HOSPITAL | Age: 63
Discharge: HOME | End: 2018-06-08
Attending: RADIOLOGY
Payer: COMMERCIAL

## 2018-06-08 DIAGNOSIS — C61 PROSTATE CANCER (HCC): ICD-10-CM

## 2018-06-08 PROCEDURE — 72197 MRI PELVIS W/O & W/DYE: CPT | Performed by: RADIOLOGY

## 2018-06-08 PROCEDURE — 82565 ASSAY OF CREATININE: CPT

## 2018-06-08 PROCEDURE — A9576 INJ PROHANCE MULTIPACK: HCPCS | Performed by: RADIOLOGY

## 2018-07-01 ENCOUNTER — APPOINTMENT (OUTPATIENT)
Dept: RADIATION ONCOLOGY | Facility: HOSPITAL | Age: 63
End: 2018-07-01
Attending: RADIOLOGY
Payer: COMMERCIAL

## 2018-07-10 PROCEDURE — 77334 RADIATION TREATMENT AID(S): CPT | Performed by: RADIOLOGY

## 2018-07-12 ENCOUNTER — OFFICE VISIT (OUTPATIENT)
Dept: SURGERY | Facility: CLINIC | Age: 63
End: 2018-07-12

## 2018-07-12 VITALS
WEIGHT: 255 LBS | DIASTOLIC BLOOD PRESSURE: 82 MMHG | BODY MASS INDEX: 31.05 KG/M2 | SYSTOLIC BLOOD PRESSURE: 156 MMHG | HEIGHT: 76 IN | TEMPERATURE: 98 F | RESPIRATION RATE: 16 BRPM | HEART RATE: 62 BPM

## 2018-07-12 DIAGNOSIS — C61 PROSTATE CANCER (HCC): Primary | ICD-10-CM

## 2018-07-12 PROCEDURE — 96402 CHEMO HORMON ANTINEOPL SQ/IM: CPT | Performed by: UROLOGY

## 2018-07-12 PROCEDURE — 99213 OFFICE O/P EST LOW 20 MIN: CPT | Performed by: UROLOGY

## 2018-07-12 PROCEDURE — 99212 OFFICE O/P EST SF 10 MIN: CPT | Performed by: UROLOGY

## 2018-07-12 NOTE — PROGRESS NOTES
Enoch Calvo is a 60year old male. HPI:   Patient presents with:  Prostate Cancer      77-year-old male in follow-up to a previous visit April 11, 2018.   He has high risk prostate cancer clinical T1c [place] 4+5 at the right posterior apex right Comment: 1 beer daily x20 years (as of 05/09/18)       Medications (Active prior to today's visit):    Current Outpatient Prescriptions:  Cholecalciferol (VITAMIN D) 1000 units Oral Tab Take by mouth.  Disp:  Rfl:    ibuprofen 200 MG Oral Tab Take 400

## 2018-07-12 NOTE — PROGRESS NOTES
I was asked by SANTHOSH to administer a Eilgard 3 month 22.5 mg injection to the pt. I signed the med out of the log book and I then obtained the med from the refrigerator.  I went into the exam room and I introduced myself to the pt and I verified his name and

## 2018-07-19 PROCEDURE — 77301 RADIOTHERAPY DOSE PLAN IMRT: CPT | Performed by: RADIOLOGY

## 2018-07-19 PROCEDURE — 77300 RADIATION THERAPY DOSE PLAN: CPT | Performed by: RADIOLOGY

## 2018-07-19 PROCEDURE — 77338 DESIGN MLC DEVICE FOR IMRT: CPT | Performed by: RADIOLOGY

## 2018-07-24 ENCOUNTER — OFFICE VISIT (OUTPATIENT)
Dept: RADIATION ONCOLOGY | Facility: HOSPITAL | Age: 63
End: 2018-07-24
Attending: RADIOLOGY
Payer: COMMERCIAL

## 2018-07-24 VITALS
SYSTOLIC BLOOD PRESSURE: 152 MMHG | BODY MASS INDEX: 30.81 KG/M2 | HEIGHT: 76 IN | RESPIRATION RATE: 18 BRPM | DIASTOLIC BLOOD PRESSURE: 74 MMHG | WEIGHT: 253 LBS | HEART RATE: 70 BPM

## 2018-07-24 DIAGNOSIS — C61 PROSTATE CANCER (HCC): Primary | ICD-10-CM

## 2018-07-24 PROCEDURE — 77385 HC IMRT SIMPLE: CPT | Performed by: RADIOLOGY

## 2018-07-24 NOTE — PROGRESS NOTES
[facility] Radiation Treatment Management Note 1-5    Patient:  Rena Diaz  Age:  60year old  Visit Diagnosis:    1.  Prostate cancer [facility])      Primary Rad/Onc:  Dr. Yuriy Ivory    Site Delivered Dose (Gy) Prescribed Dose

## 2018-07-25 PROCEDURE — 77385 HC IMRT SIMPLE: CPT | Performed by: RADIOLOGY

## 2018-07-26 PROCEDURE — 77385 HC IMRT SIMPLE: CPT | Performed by: RADIOLOGY

## 2018-07-27 PROCEDURE — 77385 HC IMRT SIMPLE: CPT | Performed by: RADIOLOGY

## 2018-07-27 PROCEDURE — 77336 RADIATION PHYSICS CONSULT: CPT | Performed by: RADIOLOGY

## 2018-07-30 PROCEDURE — 77385 HC IMRT SIMPLE: CPT | Performed by: RADIOLOGY

## 2018-07-31 ENCOUNTER — PATIENT MESSAGE (OUTPATIENT)
Dept: INTERNAL MEDICINE CLINIC | Facility: CLINIC | Age: 63
End: 2018-07-31

## 2018-07-31 ENCOUNTER — OFFICE VISIT (OUTPATIENT)
Dept: RADIATION ONCOLOGY | Facility: HOSPITAL | Age: 63
End: 2018-07-31
Attending: RADIOLOGY
Payer: COMMERCIAL

## 2018-07-31 VITALS
SYSTOLIC BLOOD PRESSURE: 129 MMHG | HEIGHT: 76 IN | RESPIRATION RATE: 18 BRPM | BODY MASS INDEX: 31.64 KG/M2 | WEIGHT: 259.81 LBS | DIASTOLIC BLOOD PRESSURE: 61 MMHG | HEART RATE: 75 BPM

## 2018-07-31 DIAGNOSIS — C61 PROSTATE CANCER (HCC): Primary | ICD-10-CM

## 2018-07-31 PROCEDURE — 77385 HC IMRT SIMPLE: CPT | Performed by: RADIOLOGY

## 2018-07-31 NOTE — PROGRESS NOTES
[facility] Radiation Treatment Management Note 6-10    Patient:  Nolvia Valle  Age:  60year old  Visit Diagnosis:    1.  Prostate cancer [facility])      Primary Rad/Onc:  Dr. Bernarda Sena    Site Delivered Dose (Gy) Prescribed Dos

## 2018-08-01 ENCOUNTER — APPOINTMENT (OUTPATIENT)
Dept: RADIATION ONCOLOGY | Facility: HOSPITAL | Age: 63
End: 2018-08-01
Attending: RADIOLOGY
Payer: COMMERCIAL

## 2018-08-01 PROCEDURE — 77385 HC IMRT SIMPLE: CPT | Performed by: RADIOLOGY

## 2018-08-02 ENCOUNTER — DIETICIAN VISIT (OUTPATIENT)
Dept: NUTRITION | Facility: HOSPITAL | Age: 63
End: 2018-08-02

## 2018-08-02 VITALS — BODY MASS INDEX: 31 KG/M2 | WEIGHT: 256.19 LBS

## 2018-08-02 PROCEDURE — 77385 HC IMRT SIMPLE: CPT | Performed by: RADIOLOGY

## 2018-08-02 NOTE — PROGRESS NOTES
Oncology Nutrition Assessment    Ht Readings from Last 1 Encounters:  07/31/18 : 193 cm (6' 4\")      Wt Readings from Last 1 Encounters:  08/02/18 : 116.2 kg (256 lb 3.2 oz)    BMI Calculated: Body mass index is 31.19 kg/m². Weight History:   Wt Readings

## 2018-08-02 NOTE — TELEPHONE ENCOUNTER
From: Shavonne Yao  To: Elizabeth Mahmood MD  Sent: 7/31/2018 1:09 PM CDT  Subject: Prescription Question    PLEASE SEND REFILLS FOR PANTAPRAZOLE 40MG 90 DAY SUPPLY. THE PRESCRIPTION WAS UNDER ANOTHER DOCTOR OUT OF [id number] [facility].  [facility] [place]

## 2018-08-03 PROCEDURE — 77336 RADIATION PHYSICS CONSULT: CPT | Performed by: RADIOLOGY

## 2018-08-03 PROCEDURE — 77385 HC IMRT SIMPLE: CPT | Performed by: RADIOLOGY

## 2018-08-04 RX ORDER — PANTOPRAZOLE SODIUM 40 MG/1
40 TABLET, DELAYED RELEASE ORAL
Qty: 90 TABLET | Refills: 1 | Status: SHIPPED | OUTPATIENT
Start: 2018-08-04 | End: 2019-02-28

## 2018-08-06 PROCEDURE — 77385 HC IMRT SIMPLE: CPT | Performed by: RADIOLOGY

## 2018-08-07 ENCOUNTER — TELEPHONE (OUTPATIENT)
Dept: SURGERY | Facility: CLINIC | Age: 63
End: 2018-08-07

## 2018-08-07 ENCOUNTER — OFFICE VISIT (OUTPATIENT)
Dept: RADIATION ONCOLOGY | Facility: HOSPITAL | Age: 63
End: 2018-08-07
Attending: RADIOLOGY
Payer: COMMERCIAL

## 2018-08-07 VITALS
HEART RATE: 63 BPM | BODY MASS INDEX: 31.42 KG/M2 | HEIGHT: 76 IN | DIASTOLIC BLOOD PRESSURE: 78 MMHG | SYSTOLIC BLOOD PRESSURE: 148 MMHG | RESPIRATION RATE: 18 BRPM | WEIGHT: 258 LBS

## 2018-08-07 DIAGNOSIS — C61 PROSTATE CANCER (HCC): Primary | ICD-10-CM

## 2018-08-07 PROCEDURE — 77385 HC IMRT SIMPLE: CPT | Performed by: RADIOLOGY

## 2018-08-07 NOTE — TELEPHONE ENCOUNTER
Shelia armenta pt is currently at office, needs bone scan, abdomen CT, and prostate MRI pls fax to [phone]. Thank you.

## 2018-08-07 NOTE — PROGRESS NOTES
[facility] Radiation Treatment Management Note 11-15    Patient:  Ignacio Mcdonnell  Age:  60year old  Visit Diagnosis:    1.  Prostate cancer [facility])      Primary Rad/Onc:  Dr. Marline Reddy    Site Delivered Dose (Gy) Prescribed Do

## 2018-08-08 ENCOUNTER — TELEPHONE (OUTPATIENT)
Dept: OTHER | Age: 63
End: 2018-08-08

## 2018-08-08 PROCEDURE — 77385 HC IMRT SIMPLE: CPT | Performed by: RADIOLOGY

## 2018-08-08 NOTE — TELEPHONE ENCOUNTER
PA for Pantoprazole 40 mg tab completed with [facility] via CMM response time 24-72 hours KEY [id number].

## 2018-08-08 NOTE — TELEPHONE ENCOUNTER
Per pharmacy patient needs a PA for Pantoprazole insurance is [facility] [employer] ID# [id number].

## 2018-08-09 ENCOUNTER — DIETICIAN VISIT (OUTPATIENT)
Dept: NUTRITION | Facility: HOSPITAL | Age: 63
End: 2018-08-09

## 2018-08-09 VITALS — WEIGHT: 252.81 LBS | BODY MASS INDEX: 31 KG/M2

## 2018-08-09 PROCEDURE — 77385 HC IMRT SIMPLE: CPT | Performed by: RADIOLOGY

## 2018-08-09 NOTE — PROGRESS NOTES
Oncology Nutrition Assessment    Ht Readings from Last 1 Encounters:  08/07/18 : 193 cm (6' 4\")      Wt Readings from Last 1 Encounters:  08/09/18 : 114.7 kg (252 lb 12.8 oz)    BMI Calculated: Body mass index is 30.77 kg/m². Weight History:   Wt Reading

## 2018-08-10 ENCOUNTER — NURSE ONLY (OUTPATIENT)
Dept: RADIATION ONCOLOGY | Facility: HOSPITAL | Age: 63
End: 2018-08-10

## 2018-08-10 PROCEDURE — 77385 HC IMRT SIMPLE: CPT | Performed by: RADIOLOGY

## 2018-08-10 PROCEDURE — 77336 RADIATION PHYSICS CONSULT: CPT | Performed by: RADIOLOGY

## 2018-08-10 NOTE — PROGRESS NOTES
Pt seen following radiation today for c/o of seeing loos in his stool this am. Pt does have a hx of internal and external hemorrhoids. States the water in the toilet was not bloody, but he saw streaks of blood on his stool. Seen by Dr Ricco Tesfaye.  Instructions give

## 2018-08-13 PROCEDURE — 77385 HC IMRT SIMPLE: CPT | Performed by: RADIOLOGY

## 2018-08-14 ENCOUNTER — OFFICE VISIT (OUTPATIENT)
Dept: RADIATION ONCOLOGY | Facility: HOSPITAL | Age: 63
End: 2018-08-14
Attending: RADIOLOGY
Payer: COMMERCIAL

## 2018-08-14 VITALS
SYSTOLIC BLOOD PRESSURE: 148 MMHG | TEMPERATURE: 98 F | DIASTOLIC BLOOD PRESSURE: 71 MMHG | RESPIRATION RATE: 18 BRPM | HEIGHT: 76 IN | BODY MASS INDEX: 30.76 KG/M2 | WEIGHT: 252.63 LBS | HEART RATE: 77 BPM

## 2018-08-14 DIAGNOSIS — C61 PROSTATE CANCER (HCC): Primary | ICD-10-CM

## 2018-08-14 PROCEDURE — 77385 HC IMRT SIMPLE: CPT | Performed by: RADIOLOGY

## 2018-08-14 NOTE — PROGRESS NOTES
[facility] Radiation Treatment Management Note 11-15    Patient:  Buster June  Age:  60year old  Visit Diagnosis:    1.  Prostate cancer [facility])      Primary Rad/Onc:  Dr. Bryson Strong    Site Delivered Dose (Gy) Prescribed Do

## 2018-08-15 PROCEDURE — 77385 HC IMRT SIMPLE: CPT | Performed by: RADIOLOGY

## 2018-08-16 ENCOUNTER — DIETICIAN VISIT (OUTPATIENT)
Dept: NUTRITION | Facility: HOSPITAL | Age: 63
End: 2018-08-16

## 2018-08-16 VITALS — WEIGHT: 252.63 LBS | BODY MASS INDEX: 31 KG/M2

## 2018-08-16 PROCEDURE — 77385 HC IMRT SIMPLE: CPT | Performed by: RADIOLOGY

## 2018-08-16 NOTE — PROGRESS NOTES
Oncology Nutrition Assessment    Ht Readings from Last 1 Encounters:  08/14/18 : 193 cm (6' 4\")      Wt Readings from Last 1 Encounters:  08/16/18 : 114.6 kg (252 lb 9.6 oz)    BMI Calculated: Body mass index is 30.75 kg/m². Weight History:   Wt Readings managed with imodium bid. Burning with urination improved with AZO. Tired. Diet basics being followed. Fluid intake good.  [address] [facility]   Clinical Dietitian [id number]

## 2018-08-17 PROCEDURE — 77385 HC IMRT SIMPLE: CPT | Performed by: RADIOLOGY

## 2018-08-17 PROCEDURE — 77336 RADIATION PHYSICS CONSULT: CPT | Performed by: RADIOLOGY

## 2018-08-20 PROCEDURE — 77385 HC IMRT SIMPLE: CPT | Performed by: RADIOLOGY

## 2018-08-21 ENCOUNTER — OFFICE VISIT (OUTPATIENT)
Dept: RADIATION ONCOLOGY | Facility: HOSPITAL | Age: 63
End: 2018-08-21
Attending: RADIOLOGY
Payer: COMMERCIAL

## 2018-08-21 VITALS
RESPIRATION RATE: 18 BRPM | WEIGHT: 252.81 LBS | DIASTOLIC BLOOD PRESSURE: 59 MMHG | SYSTOLIC BLOOD PRESSURE: 152 MMHG | HEART RATE: 68 BPM | HEIGHT: 76 IN | BODY MASS INDEX: 30.78 KG/M2

## 2018-08-21 DIAGNOSIS — C61 PROSTATE CANCER (HCC): Primary | ICD-10-CM

## 2018-08-21 PROCEDURE — 77385 HC IMRT SIMPLE: CPT | Performed by: RADIOLOGY

## 2018-08-21 NOTE — PROGRESS NOTES
[facility] Radiation Treatment Management Note 21-25    Patient:  Stanley Omalley  Age:  60year old  Visit Diagnosis:    1.  Prostate cancer [facility])      Primary Rad/Onc:  Dr. Lucia Morin    Site Delivered Dose (Gy) Prescribed Do

## 2018-08-22 PROCEDURE — 77385 HC IMRT SIMPLE: CPT | Performed by: RADIOLOGY

## 2018-08-23 ENCOUNTER — DIETICIAN VISIT (OUTPATIENT)
Dept: NUTRITION | Facility: HOSPITAL | Age: 63
End: 2018-08-23

## 2018-08-23 VITALS — BODY MASS INDEX: 31 KG/M2 | WEIGHT: 252.19 LBS

## 2018-08-23 PROCEDURE — 77385 HC IMRT SIMPLE: CPT | Performed by: RADIOLOGY

## 2018-08-23 NOTE — TELEPHONE ENCOUNTER
PA denied. Plan states formulary alternatives are esomeprazole, lansoprazole, omeprazole, and dexilant.

## 2018-08-23 NOTE — PROGRESS NOTES
Oncology Nutrition Assessment    Ht Readings from Last 1 Encounters:  08/21/18 : 193 cm (6' 4\")      Wt Readings from Last 1 Encounters:  08/23/18 : 114.4 kg (252 lb 3.2 oz)    BMI Calculated: Body mass index is 30.7 kg/m². Weight History:   Wt Readings managed with imodium bid. Burning with urination improved with AZO. Tired. Diet basics being followed. Fluid intake good. CPM    8/23/18 252. 2#--stable. Appetite fair to good. Loose BM's controlled with 1 imodium bid. AZO has helped with urinary issues.

## 2018-08-24 PROCEDURE — 77336 RADIATION PHYSICS CONSULT: CPT | Performed by: RADIOLOGY

## 2018-08-24 PROCEDURE — 77385 HC IMRT SIMPLE: CPT | Performed by: RADIOLOGY

## 2018-08-24 RX ORDER — TIZANIDINE 2 MG/1
TABLET ORAL
Qty: 90 TABLET | Refills: 1 | Status: SHIPPED | OUTPATIENT
Start: 2018-08-24 | End: 2019-02-28

## 2018-08-24 NOTE — TELEPHONE ENCOUNTER
LO : 5-9-18 Las Rx: 11-21-17     No protocol     Please advise in regards to refill request. Thank You

## 2018-08-27 ENCOUNTER — DOCUMENTATION ONLY (OUTPATIENT)
Dept: RADIATION ONCOLOGY | Facility: HOSPITAL | Age: 63
End: 2018-08-27

## 2018-08-27 PROCEDURE — 77385 HC IMRT SIMPLE: CPT | Performed by: RADIOLOGY

## 2018-08-27 NOTE — PATIENT INSTRUCTIONS
. Continue to avoid those foods you have been avoiding for the next 2 weeks. Slowly reintroduce back into your diet. Please call with any questions or concerns to Miladis Pierre @ [phone]. I will call with follow up appointment.

## 2018-08-27 NOTE — PROGRESS NOTES
Pt completed radiation today. Will see Dr Zofia Estrada for brachytherapy next month, and Dr Angela Reyna in October for his next ADT injection. Pt feeling very well overall. No complaints voiced. No c/o diarrhea, or urinary issues at this time.  Plan to call pt for foll

## 2018-08-31 ENCOUNTER — TELEPHONE (OUTPATIENT)
Dept: SURGERY | Facility: CLINIC | Age: 63
End: 2018-08-31

## 2018-08-31 PROCEDURE — 77336 RADIATION PHYSICS CONSULT: CPT | Performed by: RADIOLOGY

## 2018-09-01 ENCOUNTER — APPOINTMENT (OUTPATIENT)
Dept: RADIATION ONCOLOGY | Facility: HOSPITAL | Age: 63
End: 2018-09-01
Attending: RADIOLOGY
Payer: COMMERCIAL

## 2018-09-06 NOTE — PROGRESS NOTES
[facility]    PATIENT'S NAME: NATHAN [address]   RADIATION ONCOLOGIST: Talon Dillon.  Smith Light MD   PATIENT ACCOUNT #: [de-identified] LOCATION: [address] [place] [address] RECORD #: [id number] YOB: 1955   DATE: 08/27/2018       RADIATION ONC well overall. He did have some dysuria, which was treated with Azo quite effectively. He had some loose bowels, which were treated with over-the-counter Imodium. At the peak, he was taking about 2 Imodium per day, and this controlled it fairly well.   He

## 2018-10-11 ENCOUNTER — OFFICE VISIT (OUTPATIENT)
Dept: SURGERY | Facility: CLINIC | Age: 63
End: 2018-10-11
Payer: COMMERCIAL

## 2018-10-11 VITALS
TEMPERATURE: 98 F | BODY MASS INDEX: 29.47 KG/M2 | HEART RATE: 67 BPM | SYSTOLIC BLOOD PRESSURE: 150 MMHG | HEIGHT: 76 IN | DIASTOLIC BLOOD PRESSURE: 83 MMHG | WEIGHT: 242 LBS

## 2018-10-11 DIAGNOSIS — K76.9 LIVER LESION: ICD-10-CM

## 2018-10-11 DIAGNOSIS — C61 PROSTATE CANCER (HCC): Primary | ICD-10-CM

## 2018-10-11 PROCEDURE — 99213 OFFICE O/P EST LOW 20 MIN: CPT | Performed by: UROLOGY

## 2018-10-11 PROCEDURE — 99212 OFFICE O/P EST SF 10 MIN: CPT | Performed by: UROLOGY

## 2018-10-11 PROCEDURE — 96402 CHEMO HORMON ANTINEOPL SQ/IM: CPT | Performed by: UROLOGY

## 2018-10-11 NOTE — PROGRESS NOTES
Mindy Flores is a 58year old male. HPI:   Patient presents with:  Prostate Cancer: Patient here for 3 month Eligard injection. 21-year-old male presents in follow-up to radioactive seed implant October 5, 2018.   He has a history of clinical Never Used    Alcohol use: Yes      Comment: 1 beer daily x20 years (as of 05/09/18)    Drug use: Yes      Types: Cannabis      Comment: 1-2x/week for the last 6 months (as of 05/09/18)       Medications (Active prior to today's visit):    Current Outpatie

## 2018-10-20 ENCOUNTER — APPOINTMENT (OUTPATIENT)
Dept: LAB | Facility: HOSPITAL | Age: 63
End: 2018-10-20
Attending: INTERNAL MEDICINE
Payer: COMMERCIAL

## 2018-10-20 DIAGNOSIS — E55.9 VITAMIN D DEFICIENCY: ICD-10-CM

## 2018-10-20 DIAGNOSIS — I10 ESSENTIAL HYPERTENSION: ICD-10-CM

## 2018-10-20 PROCEDURE — 80053 COMPREHEN METABOLIC PANEL: CPT

## 2018-10-20 PROCEDURE — 80061 LIPID PANEL: CPT

## 2018-10-20 PROCEDURE — 82306 VITAMIN D 25 HYDROXY: CPT

## 2018-10-20 PROCEDURE — 36415 COLL VENOUS BLD VENIPUNCTURE: CPT

## 2018-10-24 ENCOUNTER — TELEPHONE (OUTPATIENT)
Dept: SURGERY | Facility: CLINIC | Age: 63
End: 2018-10-24

## 2018-10-28 ENCOUNTER — APPOINTMENT (OUTPATIENT)
Dept: CT IMAGING | Facility: HOSPITAL | Age: 63
End: 2018-10-28
Attending: EMERGENCY MEDICINE
Payer: COMMERCIAL

## 2018-10-28 ENCOUNTER — HOSPITAL ENCOUNTER (OUTPATIENT)
Age: 63
Discharge: ANOTHER HOSPITAL | End: 2018-10-28
Attending: EMERGENCY MEDICINE
Payer: COMMERCIAL

## 2018-10-28 ENCOUNTER — HOSPITAL ENCOUNTER (EMERGENCY)
Facility: HOSPITAL | Age: 63
Discharge: HOME | End: 2018-10-28
Attending: EMERGENCY MEDICINE
Payer: COMMERCIAL

## 2018-10-28 VITALS
HEIGHT: 76 IN | HEART RATE: 70 BPM | DIASTOLIC BLOOD PRESSURE: 65 MMHG | OXYGEN SATURATION: 97 % | BODY MASS INDEX: 29.83 KG/M2 | TEMPERATURE: 98 F | RESPIRATION RATE: 22 BRPM | WEIGHT: 245 LBS | SYSTOLIC BLOOD PRESSURE: 156 MMHG

## 2018-10-28 VITALS
WEIGHT: 245 LBS | TEMPERATURE: 99 F | RESPIRATION RATE: 18 BRPM | OXYGEN SATURATION: 99 % | BODY MASS INDEX: 29.83 KG/M2 | DIASTOLIC BLOOD PRESSURE: 77 MMHG | SYSTOLIC BLOOD PRESSURE: 150 MMHG | HEART RATE: 65 BPM | HEIGHT: 76 IN

## 2018-10-28 DIAGNOSIS — H20.9 TRAUMATIC IRITIS: Primary | ICD-10-CM

## 2018-10-28 DIAGNOSIS — S05.92XA LEFT EYE INJURY, INITIAL ENCOUNTER: Primary | ICD-10-CM

## 2018-10-28 PROCEDURE — 99213 OFFICE O/P EST LOW 20 MIN: CPT

## 2018-10-28 PROCEDURE — 99284 EMERGENCY DEPT VISIT MOD MDM: CPT

## 2018-10-28 PROCEDURE — 70480 CT ORBIT/EAR/FOSSA W/O DYE: CPT | Performed by: EMERGENCY MEDICINE

## 2018-10-28 RX ORDER — TETRACAINE HYDROCHLORIDE 5 MG/ML
1 SOLUTION OPHTHALMIC ONCE
Status: COMPLETED | OUTPATIENT
Start: 2018-10-28 | End: 2018-10-28

## 2018-10-28 RX ORDER — TOBRAMYCIN AND DEXAMETHASONE 3; 1 MG/ML; MG/ML
2 SUSPENSION/ DROPS OPHTHALMIC 4 TIMES DAILY
Qty: 1 BOTTLE | Refills: 0 | Status: SHIPPED | OUTPATIENT
Start: 2018-10-28 | End: 2018-11-02

## 2018-10-28 RX ORDER — HYDROCODONE BITARTRATE AND ACETAMINOPHEN 5; 325 MG/1; MG/1
1 TABLET ORAL ONCE
Status: COMPLETED | OUTPATIENT
Start: 2018-10-28 | End: 2018-10-28

## 2018-10-28 RX ORDER — BENOXINATE HCL/FLUORESCEIN SOD 0.4%-0.25%
1 DROPS OPHTHALMIC (EYE) ONCE
Status: COMPLETED | OUTPATIENT
Start: 2018-10-28 | End: 2018-10-28

## 2018-10-28 NOTE — ED INITIAL ASSESSMENT (HPI)
Sent from immediate care  A/ox4. No acute distress   patient hit to left eye from [facility] spring door.   +nausea  No dizziness  +light sensitivity

## 2018-10-28 NOTE — ED PROVIDER NOTES
Patient Seen in: [facility] AND [facility] Emergency Department    History   Patient presents with:  Head Neck Injury (neurologic, musculoskeletal)    Stated Complaint: Left eye trauma with garage bracket    HPI    The patient is a 39-year-old male who was playi Ht 193 cm (6' 4\")   Wt 111.1 kg   SpO2 99%   BMI 29.82 kg/m²         Physical Exam   Constitutional: He is oriented to person, place, and time. He appears well-developed and well-nourished. HENT:   Head: Normocephalic.  Head is with contusion (Left perio 10/28/2018 at 14:01     Approved by (CST): Marcie Reilly MD on 10/28/2018 at 14:12            Radiology exams  Viewed and reviewed by myself and findings discussed with patient including need for follow up              Disposition and Plan     Clinical

## 2018-10-28 NOTE — ED NOTES
Pt advised to go to ER for further eval of eye and agrees with plan of care and will go to [facility]

## 2018-10-28 NOTE — ED INITIAL ASSESSMENT (HPI)
Pt rpts installing a garage door and a bracket came lose and hit him in his left eye yesterday Light sensitive painful and unable to open eye.   Also rpts feeling nauseous this am.

## 2018-10-28 NOTE — ED PROVIDER NOTES
Patient Seen in: [facility]    History   Patient presents with:   Eye Visual Problem (opthalmic)    Stated Complaint: lt eye injury    HPI    58-year-old male presents for complaint of left eye injury occurring yesterday Other systems are as noted in HPI. Constitutional and vital signs reviewed. All other systems reviewed and negative except as noted above.     Physical Exam     ED Triage Vitals [10/28/18 1142]   /65   Pulse 70   Resp 22   Temp 98.3 °F (36.8 °C) Patient's tetanus is up-to-date. Patient's exam is limited by inability to tolerate keeping his eye open. Patient does have a subconjunctival hematoma as well as significant periorbital swelling. He has also periorbital tenderness.   There is concern for

## 2018-10-31 ENCOUNTER — OFFICE VISIT (OUTPATIENT)
Dept: INTERNAL MEDICINE CLINIC | Facility: CLINIC | Age: 63
End: 2018-10-31
Payer: COMMERCIAL

## 2018-10-31 VITALS
TEMPERATURE: 98 F | HEIGHT: 75.5 IN | SYSTOLIC BLOOD PRESSURE: 129 MMHG | HEART RATE: 66 BPM | RESPIRATION RATE: 18 BRPM | BODY MASS INDEX: 30.7 KG/M2 | DIASTOLIC BLOOD PRESSURE: 74 MMHG | WEIGHT: 249.5 LBS

## 2018-10-31 DIAGNOSIS — R73.9 ELEVATED BLOOD SUGAR: ICD-10-CM

## 2018-10-31 DIAGNOSIS — E55.9 VITAMIN D DEFICIENCY: ICD-10-CM

## 2018-10-31 DIAGNOSIS — D12.6 TUBULAR ADENOMA OF COLON: ICD-10-CM

## 2018-10-31 DIAGNOSIS — G47.33 OBSTRUCTIVE SLEEP APNEA SYNDROME: ICD-10-CM

## 2018-10-31 DIAGNOSIS — C61 PROSTATE CANCER (HCC): Primary | ICD-10-CM

## 2018-10-31 DIAGNOSIS — I10 ESSENTIAL HYPERTENSION: ICD-10-CM

## 2018-10-31 DIAGNOSIS — R91.1 PULMONARY NODULE: ICD-10-CM

## 2018-10-31 DIAGNOSIS — Z72.0 TOBACCO ABUSE: ICD-10-CM

## 2018-10-31 DIAGNOSIS — Z00.00 ROUTINE PHYSICAL EXAMINATION: ICD-10-CM

## 2018-10-31 PROCEDURE — 99396 PREV VISIT EST AGE 40-64: CPT | Performed by: INTERNAL MEDICINE

## 2018-10-31 PROCEDURE — 90686 IIV4 VACC NO PRSV 0.5 ML IM: CPT | Performed by: INTERNAL MEDICINE

## 2018-10-31 PROCEDURE — 90471 IMMUNIZATION ADMIN: CPT | Performed by: INTERNAL MEDICINE

## 2018-10-31 RX ORDER — OMEPRAZOLE 20 MG/1
20 CAPSULE, DELAYED RELEASE ORAL
COMMUNITY
End: 2019-09-19

## 2018-10-31 RX ORDER — ERGOCALCIFEROL 1.25 MG/1
50000 CAPSULE ORAL WEEKLY
Qty: 12 CAPSULE | Refills: 1 | Status: SHIPPED | OUTPATIENT
Start: 2018-10-31 | End: 2018-11-30

## 2018-10-31 NOTE — PATIENT INSTRUCTIONS
Problem List Items Addressed This Visit        Unprioritized    Essential hypertension     Blood pressure 129/74, pulse 66, temperature 97.9 °F (36.6 °C), temperature source Oral, resp. rate 18, height 6' 3.5\" (1.918 m), weight 249 lb 8 oz (113.2 kg).   Re Juan Administered   • FLULAVAL 6 months & older 0.5 ml Prefilled syringe ([id number]) 10/31/2018   • Fluvirin, 3 Years & >, Im 10/27/2008, 10/08/2013, 11/20/2014   • Influenza 10/15/2015, 11/03/2016   • Pneumovax 23 12/15/2015   • TD 12/01/2002   • TDAP 09/04/2013

## 2018-10-31 NOTE — ASSESSMENT & PLAN NOTE
Normal exam.  Labs as ordered. Skin check–normal  No cervical, axillary, inguinal lymphadenopathy. Hernial orifices intact. Rectal exam normal,prostate normal to palpation. Small hemorrhoids present.  guaic negetive brown stools.   Colonoscopy due on 08

## 2018-10-31 NOTE — PROGRESS NOTES
HPI:    Patient ID: Reno Arrieta is a 58year old male.     Physical    Colonoscopy due on 08/13/2023  Per Dr Thiago Hyatt 10/2018    Prostate Cancer: Patient here for 3 month Eligard injection.         78-year-old male presents in follow-up to radioactive Used    Alcohol use: Yes      Comment: 1 beer daily x20 years (as of 05/09/18)    Drug use: Yes      Types: Cannabis      Comment: 1-2x/week for the last 6 months (as of 05/09/18)    Review of patient's family history indicates:  Problem: Hypertension D) 1000 units Oral Tab Take by mouth. Disp:  Rfl:    ibuprofen 200 MG Oral Tab Take 400 mg by mouth every 6 (six) hours as needed for Pain. Disp:  Rfl:    Vitamin B-12 1000 MCG Oral Tab Take 1,000 mcg by mouth daily.  Disp:  Rfl:    lisinopril 40 MG Oral Ta pressure, well controlled on lisinopril 40 mg daily. Stress test look normal.  Cholesterol panel looks stable. Continue strict diet controlled for fatty foods, fried foods, sugars and salt.   Drink plenty of fluids as directed peer         Relevant Orders as directed. Routine physical examination     Normal exam.  Labs as ordered. Skin check–normal  No cervical, axillary, inguinal lymphadenopathy. Hernial orifices intact. Rectal exam normal,prostate normal to palpation.   Small hemorrhoids present ([id number])       [id number]

## 2018-10-31 NOTE — ASSESSMENT & PLAN NOTE
Blood pressure 129/74, pulse 66, temperature 97.9 °F (36.6 °C), temperature source Oral, resp. rate 18, height 6' 3.5\" (1.918 m), weight 249 lb 8 oz (113.2 kg). Blood pressure, well controlled on lisinopril 40 mg daily.   Stress test look normal.  Cholest

## 2018-10-31 NOTE — ASSESSMENT & PLAN NOTE
Patient with history of T1c Phil 4+5 and 4+4 prostate cancer with PSA 23 and saturation biopsy performed on March 2018.   He underwent combination of external beam radiation therapy and a seed boost.  He has also been on androgen deprivation therapy–Gunner

## 2018-10-31 NOTE — ASSESSMENT & PLAN NOTE
Patient has been on a CPAP–she did not go in for follow-up studies due to multiple medical issues at this time.   Will reassess needs next year

## 2018-10-31 NOTE — ASSESSMENT & PLAN NOTE
Discussed tapering down and quitting smoking but he has not been able to successfully complete tapering down his cigarettes. Trial of Chantix provided.   Advised to follow-up in 6 weeks

## 2018-11-01 ENCOUNTER — HOSPITAL ENCOUNTER (OUTPATIENT)
Dept: MRI IMAGING | Age: 63
Discharge: HOME | End: 2018-11-01
Attending: UROLOGY
Payer: COMMERCIAL

## 2018-11-01 DIAGNOSIS — K76.9 LIVER LESION: ICD-10-CM

## 2018-11-01 PROCEDURE — A9575 INJ GADOTERATE MEGLUMI 0.1ML: HCPCS | Performed by: UROLOGY

## 2018-11-01 PROCEDURE — 74183 MRI ABD W/O CNTR FLWD CNTR: CPT | Performed by: UROLOGY

## 2018-11-08 ENCOUNTER — TELEPHONE (OUTPATIENT)
Dept: RADIATION ONCOLOGY | Facility: HOSPITAL | Age: 63
End: 2018-11-08

## 2018-11-08 NOTE — TELEPHONE ENCOUNTER
Called pt and LVM regarding a follow up appointment this month with Dr Eleni Olmedo. Left my direct phone number for a return call.

## 2018-11-16 ENCOUNTER — OFFICE VISIT (OUTPATIENT)
Dept: SLEEP CENTER | Age: 63
End: 2018-11-16
Attending: INTERNAL MEDICINE
Payer: COMMERCIAL

## 2018-11-16 DIAGNOSIS — Z76.89 SLEEP CONCERN: Primary | ICD-10-CM

## 2018-11-16 PROCEDURE — 95811 POLYSOM 6/>YRS CPAP 4/> PARM: CPT

## 2018-11-17 NOTE — PROCEDURES
[facility]  Accredited by the [employer] (AASM)    PATIENT'S NAME: Maxime Elias   ATTENDING PHYSICIAN: Kamini Yoo. Nakul Morin MD   REFERRING PHYSICIAN: Kamini Yoo.  Nakul Morin MD   PATIENT ACCOUNT #: [de-identified] LOCATION: S the spontaneous arousal index was 14.8 events per hour for a combined arousal index of 36.4 events per hour.   There were 22 periodic limb movements for a periodic limb movement index of 10.8 events per hour, of which 3 per hour were associated with arousal hour and the lowest desaturation was to 91%.   Sleep architecture was severely fragmented during the diagnostic analysis and then much better consolidated with 2 long normal sleep cycles during the treatment analysis and supine REM was demonstrated during t

## 2018-11-23 ENCOUNTER — TELEPHONE (OUTPATIENT)
Dept: ADMINISTRATIVE | Age: 63
End: 2018-11-23

## 2018-11-30 ENCOUNTER — OFFICE VISIT (OUTPATIENT)
Dept: INTERNAL MEDICINE CLINIC | Facility: CLINIC | Age: 63
End: 2018-11-30
Payer: COMMERCIAL

## 2018-11-30 ENCOUNTER — OFFICE VISIT (OUTPATIENT)
Dept: RADIATION ONCOLOGY | Facility: HOSPITAL | Age: 63
End: 2018-11-30
Attending: RADIOLOGY
Payer: COMMERCIAL

## 2018-11-30 VITALS
HEIGHT: 76 IN | HEART RATE: 68 BPM | WEIGHT: 251.63 LBS | TEMPERATURE: 98 F | RESPIRATION RATE: 18 BRPM | DIASTOLIC BLOOD PRESSURE: 74 MMHG | SYSTOLIC BLOOD PRESSURE: 150 MMHG | BODY MASS INDEX: 30.64 KG/M2

## 2018-11-30 VITALS
DIASTOLIC BLOOD PRESSURE: 79 MMHG | BODY MASS INDEX: 30.56 KG/M2 | SYSTOLIC BLOOD PRESSURE: 147 MMHG | HEIGHT: 76 IN | RESPIRATION RATE: 18 BRPM | HEART RATE: 74 BPM | WEIGHT: 251 LBS

## 2018-11-30 DIAGNOSIS — C61 PROSTATE CANCER (HCC): Primary | ICD-10-CM

## 2018-11-30 DIAGNOSIS — G47.33 OBSTRUCTIVE SLEEP APNEA SYNDROME: Primary | ICD-10-CM

## 2018-11-30 PROCEDURE — 99214 OFFICE O/P EST MOD 30 MIN: CPT | Performed by: INTERNAL MEDICINE

## 2018-11-30 PROCEDURE — 99212 OFFICE O/P EST SF 10 MIN: CPT | Performed by: INTERNAL MEDICINE

## 2018-11-30 PROCEDURE — 99211 OFF/OP EST MAY X REQ PHY/QHP: CPT

## 2018-11-30 RX ORDER — TAMSULOSIN HYDROCHLORIDE 0.4 MG/1
0.4 CAPSULE ORAL 2 TIMES DAILY
COMMUNITY
Start: 2018-11-30 | End: 2019-02-28

## 2018-11-30 NOTE — PATIENT INSTRUCTIONS
Problem List Items Addressed This Visit        Unprioritized    Obstructive sleep apnea syndrome - Primary     Patient has completed his CPAP re-titration study.   Dre prescription for machine will be sent in today and will follow-up after use in the nex

## 2018-11-30 NOTE — PROGRESS NOTES
HPI:    Patient ID: Rena Diaz is a 58year old male. INTERPRETATION:  The data generated from this study is consistent with severe obstructive sleep apnea (ICD-10 code G47.33).   A favorable response was demonstrated to CPAP 10 CWP with signific 20 MG Oral Capsule Delayed Release Take 20 mg by mouth every morning before breakfast. Disp:  Rfl:    TIZANIDINE HCL 2 MG Oral Tab TAKE 1 TABLET BY MOUTH AT BEDTIME Disp: 90 tablet Rfl: 1   Pantoprazole Sodium 40 MG Oral Tab EC Take 1 tablet (40 mg total) Visit        Unprioritized    Obstructive sleep apnea syndrome - Primary     Patient has completed his CPAP re-titration study.   New prescription for machine will be sent in today and will follow-up after use in the next 6 weeks    Patient is advised to gi

## 2018-11-30 NOTE — PROGRESS NOTES
Pt seen in 3 month follow up with Dr Stanley Rodriguez, having completed radiation 8/27/18. Arrived alone. AUA score 29 which places pt in the severe urinary dysfunction category, pt is mostly dissatisfied with this score.  Continues to have urinary burning, which

## 2018-11-30 NOTE — ASSESSMENT & PLAN NOTE
Patient has completed his CPAP re-titration study.   New prescription for machine will be sent in today and will follow-up after use in the next 6 weeks    Patient is advised to give me a call if he does not receive a call back from the supply company in th

## 2018-11-30 NOTE — PATIENT INSTRUCTIONS
Increase Flomax to 2x daily. AZO for burning. No further follow up with Dr Rocky Jackson, but please call with any questions or concerns.

## 2018-12-03 NOTE — PROGRESS NOTES
[facility]    PATIENT'S NAME: NATHAN [address] [facility]   RADIATION ONCOLOGIST: Chelly Nielsen.  Sol Owens MD   PATIENT ACCOUNT #: [de-identified] LOCATION: [address] [place] [address] RECORD #: [id number] YOB: 1955   FOLLOW-UP DATE: 11/30/2018       RAD having a significant impact upon his quality of life. He particularly has problems with urgency and weak stream and also has had some significant urinary burning.   He was placed on tamsulosin 0.4 mg, but states that this really has not helped him a great as they are. He has an appointment to see Dr. Sam Sprague in January for his next hormone injection. Given that he is continuing to follow with Dr. Jacque Zimmer and Dr. Sam Sprague, I do not think it is really necessary that he see 3 physicians for the same disease.   Arthor Bernheim

## 2018-12-04 ENCOUNTER — TELEPHONE (OUTPATIENT)
Dept: INTERNAL MEDICINE CLINIC | Facility: CLINIC | Age: 63
End: 2018-12-04

## 2018-12-06 ENCOUNTER — TELEPHONE (OUTPATIENT)
Dept: INTERNAL MEDICINE CLINIC | Facility: CLINIC | Age: 63
End: 2018-12-06

## 2018-12-06 NOTE — TELEPHONE ENCOUNTER
[facility] associate stated that they received the order for the CPAP machine for the pt, but needs the pt's sleep study results.   Please advise    Ph. [phone]  Fax [phone]

## 2019-01-07 RX ORDER — LISINOPRIL 40 MG/1
TABLET ORAL
Qty: 90 TABLET | Refills: 0 | Status: SHIPPED | OUTPATIENT
Start: 2019-01-07 | End: 2019-04-15

## 2019-01-08 NOTE — TELEPHONE ENCOUNTER
Refill passed per [facility] protocol.   Hypertensive Medications  Protocol Criteria:  · Appointment scheduled in the past 6 months or in the next 3 months  · BMP or CMP in the past 12 months  · Creatinine result < 2  Recent Outpatient Visits

## 2019-01-15 ENCOUNTER — TELEPHONE (OUTPATIENT)
Dept: SURGERY | Facility: CLINIC | Age: 64
End: 2019-01-15

## 2019-01-15 ENCOUNTER — OFFICE VISIT (OUTPATIENT)
Dept: SURGERY | Facility: CLINIC | Age: 64
End: 2019-01-15
Payer: COMMERCIAL

## 2019-01-15 VITALS
WEIGHT: 250 LBS | RESPIRATION RATE: 16 BRPM | HEART RATE: 73 BPM | TEMPERATURE: 98 F | DIASTOLIC BLOOD PRESSURE: 77 MMHG | HEIGHT: 76 IN | BODY MASS INDEX: 30.44 KG/M2 | SYSTOLIC BLOOD PRESSURE: 143 MMHG

## 2019-01-15 DIAGNOSIS — C61 PROSTATE CANCER (HCC): Primary | ICD-10-CM

## 2019-01-15 PROCEDURE — 99213 OFFICE O/P EST LOW 20 MIN: CPT | Performed by: UROLOGY

## 2019-01-15 PROCEDURE — 99212 OFFICE O/P EST SF 10 MIN: CPT | Performed by: UROLOGY

## 2019-01-16 NOTE — TELEPHONE ENCOUNTER
SUSANNAB gave orders to administer pt a 3 month eligard 22.5 mg SQ injection one time today and pt was told that we needed to check with his insurance for PA first and he could wait if he chose to but that it may take some time.  Pt elected to return on Fri. 1/1

## 2019-01-16 NOTE — TELEPHONE ENCOUNTER
Medication PA Requested: Eligard 22.5 mg SQ injection                                                     Pt insurance/number to contact: [employer] PPO [facility]/ [phone]  CoverMyMeds Used: no    Key:   Insurance ID# and group: [id number]

## 2019-01-17 ENCOUNTER — MED REC SCAN ONLY (OUTPATIENT)
Dept: INTERNAL MEDICINE CLINIC | Facility: CLINIC | Age: 64
End: 2019-01-17

## 2019-01-17 NOTE — TELEPHONE ENCOUNTER
I printed SANTHOSH's note from the 1/15 visit and I filled out the questionaire and had SANTHOSH sign it and I faxed all docs back and received a fax conf.  Will wait for determination but the rep yesterday told me that the turn around time around is 48 hrs so it may

## 2019-01-17 NOTE — PROGRESS NOTES
Buster June is a 58year old male.     HPI:   Patient presents with:  Prostate Cancer: pt LOV 10/11/18      27-year-old -American male with a history of high risk prostate cancer clinical T1c Phil 4+5 and 4+4 PSA 23 and saturation biopsy pe Comment: 1 beer daily x20 years (as of 05/09/18)    Drug use: Yes      Types: Cannabis      Comment: 1-2x/week for the last 6 months (as of 05/09/18)       Medications (Active prior to today's visit):    Current Outpatient Medications:  LISINOPRIL 40 MG O

## 2019-01-18 NOTE — TELEPHONE ENCOUNTER
I received a faxed form from [employer] asking for us to fax ALL clinical info which I already did yesterday to the fax # that I was provided on my first attempt to obtain Auth.  I called the # on the form and LMTCB for Allie to dimple/maryjane and inform me what ot

## 2019-01-18 NOTE — TELEPHONE ENCOUNTER
I called pt to inform him that I have not yet received a response from the INS on a determination despite the fact that I sent them additional info this morning and also LM for the  asking her what more info she is looking for.  I told him that I wi

## 2019-01-22 NOTE — TELEPHONE ENCOUNTER
I received a faxed doc from [employer] with an approval for pt's Leuprolide Acetate 22.5 mg injections valid from 1/16/19 to 1/22/2020. I tried to reach the pt to inform him and I had to Fermin Ray so that we can schd a N/V for him to get the injection.

## 2019-01-22 NOTE — TELEPHONE ENCOUNTER
Isabella from [place] contacted and stts that pts case is currently pending and will send message to RN staff to place case as urgent. To check on pending status please contact.    [facility] [employer] [phone]  Case# [id number] case can not be verified v

## 2019-01-23 NOTE — TELEPHONE ENCOUNTER
Pt called back and I informed him of the approval for his 3 mo 22.5 mg  eligard injection and I gave him a n/v appt for tomorrow, 1/24 at 2 pm.

## 2019-01-24 ENCOUNTER — NURSE ONLY (OUTPATIENT)
Dept: SURGERY | Facility: CLINIC | Age: 64
End: 2019-01-24
Payer: COMMERCIAL

## 2019-01-24 DIAGNOSIS — C61 PROSTATE CANCER (HCC): ICD-10-CM

## 2019-01-24 PROCEDURE — 96402 CHEMO HORMON ANTINEOPL SQ/IM: CPT | Performed by: UROLOGY

## 2019-02-28 ENCOUNTER — OFFICE VISIT (OUTPATIENT)
Dept: INTERNAL MEDICINE CLINIC | Facility: CLINIC | Age: 64
End: 2019-02-28
Payer: COMMERCIAL

## 2019-02-28 VITALS
WEIGHT: 262.19 LBS | HEART RATE: 77 BPM | BODY MASS INDEX: 31.93 KG/M2 | HEIGHT: 76 IN | DIASTOLIC BLOOD PRESSURE: 70 MMHG | RESPIRATION RATE: 20 BRPM | OXYGEN SATURATION: 97 % | TEMPERATURE: 99 F | SYSTOLIC BLOOD PRESSURE: 130 MMHG

## 2019-02-28 DIAGNOSIS — G47.33 OBSTRUCTIVE SLEEP APNEA SYNDROME: ICD-10-CM

## 2019-02-28 DIAGNOSIS — I10 ESSENTIAL HYPERTENSION: Primary | ICD-10-CM

## 2019-02-28 DIAGNOSIS — E55.9 VITAMIN D DEFICIENCY: ICD-10-CM

## 2019-02-28 PROCEDURE — 99212 OFFICE O/P EST SF 10 MIN: CPT | Performed by: INTERNAL MEDICINE

## 2019-02-28 PROCEDURE — 99214 OFFICE O/P EST MOD 30 MIN: CPT | Performed by: INTERNAL MEDICINE

## 2019-02-28 RX ORDER — TIZANIDINE 2 MG/1
TABLET ORAL
Qty: 90 TABLET | Refills: 1 | Status: SHIPPED
Start: 2019-02-28 | End: 2019-09-01

## 2019-02-28 RX ORDER — ERGOCALCIFEROL 1.25 MG/1
CAPSULE ORAL
Refills: 1 | COMMUNITY
Start: 2019-01-20 | End: 2019-07-09

## 2019-02-28 NOTE — PATIENT INSTRUCTIONS
Problem List Items Addressed This Visit        Unprioritized    Essential hypertension     Blood pressure 130/70, pulse 77, temperature 98.7 °F (37.1 °C), temperature source Oral, resp.  rate 20, height 6' 4\" (1.93 m), weight 262 lb 3.2 oz (118.9 kg), SpO2

## 2019-02-28 NOTE — PROGRESS NOTES
HPI:    Patient ID: Reno Arrieta is a 58year old male. CPAP usage download as follows. Data collected between December 17 and January 15. Used for about 29 days and missed 1 day. Average usage for about 5 hours, 46 minutes and 55 seconds.   Gee Gonzales Negative. Eyes: Negative. Respiratory: Negative. Negative for shortness of breath. Cardiovascular: Negative. Negative for palpitations and orthopnea. Gastrointestinal: Negative. Negative for anorexia. Genitourinary: Negative.     Musculoskel normal.   Mouth/Throat: Oropharynx is clear and moist. No oropharyngeal exudate. Eyes: Conjunctivae and EOM are normal. Pupils are equal, round, and reactive to light. Neck: Normal range of motion. Neck supple. No JVD present. No thyromegaly present. length of time at night. Will reassess needs at the next visit            Unprioritized    Vitamin D deficiency          Return in about 3 months (around 5/28/2019), or if symptoms worsen or fail to improve.     PT UNDERSTANDS AND AGREES TO FOLLOW DIRECTIO

## 2019-02-28 NOTE — ASSESSMENT & PLAN NOTE
Blood pressure 130/70, pulse 77, temperature 98.7 °F (37.1 °C), temperature source Oral, resp. rate 20, height 6' 4\" (1.93 m), weight 262 lb 3.2 oz (118.9 kg), SpO2 97 %. Table blood pressure, well controlled on lisinopril 40 mg daily.   He has had a stre

## 2019-02-28 NOTE — ASSESSMENT & PLAN NOTE
Patient has been on the CPAP. Has been using it for most of the time during the sleep hours. His average usage was about 5 hours, 46 minutes and 55 seconds. His AHI has dropped from 8.6-4.1.   He does wake up multiple times to go to the bathroom due to h

## 2019-03-01 RX ORDER — TIZANIDINE 2 MG/1
TABLET ORAL
Qty: 90 TABLET | Refills: 1 | OUTPATIENT
Start: 2019-03-01

## 2019-03-01 NOTE — TELEPHONE ENCOUNTER
Review pended refill request as it does not fall under a protocol.     Last Rx: 8/24/18  #90 w/1RF  LOV: 2/28/19

## 2019-03-28 ENCOUNTER — TELEPHONE (OUTPATIENT)
Dept: INTERNAL MEDICINE CLINIC | Facility: CLINIC | Age: 64
End: 2019-03-28

## 2019-03-28 DIAGNOSIS — G47.33 OSA (OBSTRUCTIVE SLEEP APNEA): Primary | ICD-10-CM

## 2019-03-28 NOTE — TELEPHONE ENCOUNTER
Farhan Morales from [facility] called reg order for cpap supplies- she states form was faxed to office      [facility] fax number  [phone]

## 2019-03-28 NOTE — TELEPHONE ENCOUNTER
Fax was received from [facility] listing specific CPAP supplies being requested. RIDDHI - please advise if ok to generate (referral pend for sign off). Please fax authorized referral to [facility] at [phone].

## 2019-04-10 DIAGNOSIS — E55.9 VITAMIN D DEFICIENCY: ICD-10-CM

## 2019-04-15 ENCOUNTER — OFFICE VISIT (OUTPATIENT)
Dept: SURGERY | Facility: CLINIC | Age: 64
End: 2019-04-15
Payer: COMMERCIAL

## 2019-04-15 VITALS
BODY MASS INDEX: 32 KG/M2 | WEIGHT: 262 LBS | HEART RATE: 82 BPM | SYSTOLIC BLOOD PRESSURE: 126 MMHG | DIASTOLIC BLOOD PRESSURE: 68 MMHG

## 2019-04-15 DIAGNOSIS — C61 PROSTATE CANCER (HCC): Primary | ICD-10-CM

## 2019-04-15 PROCEDURE — 99212 OFFICE O/P EST SF 10 MIN: CPT | Performed by: UROLOGY

## 2019-04-15 PROCEDURE — 99213 OFFICE O/P EST LOW 20 MIN: CPT | Performed by: UROLOGY

## 2019-04-15 PROCEDURE — 96402 CHEMO HORMON ANTINEOPL SQ/IM: CPT | Performed by: UROLOGY

## 2019-04-15 RX ORDER — LISINOPRIL 40 MG/1
TABLET ORAL
Qty: 90 TABLET | Refills: 1 | Status: SHIPPED | OUTPATIENT
Start: 2019-04-15 | End: 2019-10-14

## 2019-04-15 RX ORDER — ERGOCALCIFEROL 1.25 MG/1
CAPSULE ORAL
Qty: 12 CAPSULE | Refills: 1 | OUTPATIENT
Start: 2019-04-15

## 2019-04-15 NOTE — PROGRESS NOTES
Adalberto Guy is a 58year old male.     HPI:   Patient presents with:  Prostate Cancer: patient presents for 3 mo eligard injection      62-year-old -American male with high risk prostate cancer clinical T1c Phil 4+5 and 4+4 PSA 23 diagnosed Smoker        Packs/day: 0.50        Years: 40.00        Pack years: 20        Types: Cigarettes      Smokeless tobacco: Never Used    Alcohol use: Yes      Comment: 1 beer daily x20 years (as of 02/28/2019)    Drug use: Yes      Types: Cannabis      Comme

## 2019-04-29 ENCOUNTER — PATIENT MESSAGE (OUTPATIENT)
Dept: SURGERY | Facility: CLINIC | Age: 64
End: 2019-04-29

## 2019-05-02 ENCOUNTER — HOSPITAL ENCOUNTER (OUTPATIENT)
Dept: CT IMAGING | Facility: HOSPITAL | Age: 64
Discharge: HOME | End: 2019-05-02
Attending: INTERNAL MEDICINE
Payer: COMMERCIAL

## 2019-05-02 DIAGNOSIS — R91.1 PULMONARY NODULE: ICD-10-CM

## 2019-05-02 PROCEDURE — 71260 CT THORAX DX C+: CPT | Performed by: INTERNAL MEDICINE

## 2019-05-02 PROCEDURE — 82565 ASSAY OF CREATININE: CPT

## 2019-05-02 NOTE — TELEPHONE ENCOUNTER
From: Manuel Anderson  To:  Shashank Franco MD  Sent: 4/29/2019 3:21 PM CDT  Subject: Visit Gualberto Mendosa called here and said you would not be there for my 7/16/2019 appointment can you have someone call me all I need i

## 2019-05-09 ENCOUNTER — TELEPHONE (OUTPATIENT)
Dept: INTERNAL MEDICINE CLINIC | Facility: CLINIC | Age: 64
End: 2019-05-09

## 2019-05-09 NOTE — TELEPHONE ENCOUNTER
Reji Bernard was called back and notified that pt was seen for f/u in Feb. No further actions required. Will convert order to purchase for the pt's supplies.

## 2019-05-09 NOTE — TELEPHONE ENCOUNTER
Per Zella Kehr from [place]  is calling to check if pt has been seen for follow ups for his c pap supplies has more questions once nurse calls him back.

## 2019-06-07 ENCOUNTER — LAB ENCOUNTER (OUTPATIENT)
Dept: LAB | Facility: HOSPITAL | Age: 64
End: 2019-06-07
Attending: INTERNAL MEDICINE
Payer: COMMERCIAL

## 2019-06-07 DIAGNOSIS — I10 ESSENTIAL HYPERTENSION: ICD-10-CM

## 2019-06-07 DIAGNOSIS — R73.9 ELEVATED BLOOD SUGAR: ICD-10-CM

## 2019-06-07 DIAGNOSIS — Z00.00 ROUTINE PHYSICAL EXAMINATION: ICD-10-CM

## 2019-06-07 DIAGNOSIS — E55.9 VITAMIN D DEFICIENCY: ICD-10-CM

## 2019-06-07 PROCEDURE — 82306 VITAMIN D 25 HYDROXY: CPT

## 2019-06-07 PROCEDURE — 81003 URINALYSIS AUTO W/O SCOPE: CPT

## 2019-06-07 PROCEDURE — 82043 UR ALBUMIN QUANTITATIVE: CPT

## 2019-06-07 PROCEDURE — 80053 COMPREHEN METABOLIC PANEL: CPT

## 2019-06-07 PROCEDURE — 82570 ASSAY OF URINE CREATININE: CPT

## 2019-06-07 PROCEDURE — 85025 COMPLETE CBC W/AUTO DIFF WBC: CPT

## 2019-06-07 PROCEDURE — 80061 LIPID PANEL: CPT

## 2019-06-07 PROCEDURE — 36415 COLL VENOUS BLD VENIPUNCTURE: CPT

## 2019-06-07 PROCEDURE — 83036 HEMOGLOBIN GLYCOSYLATED A1C: CPT

## 2019-07-09 ENCOUNTER — OFFICE VISIT (OUTPATIENT)
Dept: INTERNAL MEDICINE CLINIC | Facility: CLINIC | Age: 64
End: 2019-07-09
Payer: COMMERCIAL

## 2019-07-09 VITALS
HEIGHT: 76 IN | DIASTOLIC BLOOD PRESSURE: 78 MMHG | SYSTOLIC BLOOD PRESSURE: 134 MMHG | HEART RATE: 76 BPM | WEIGHT: 247 LBS | TEMPERATURE: 99 F | BODY MASS INDEX: 30.08 KG/M2 | RESPIRATION RATE: 18 BRPM

## 2019-07-09 DIAGNOSIS — C61 PROSTATE CANCER (HCC): ICD-10-CM

## 2019-07-09 DIAGNOSIS — E55.9 VITAMIN D DEFICIENCY: ICD-10-CM

## 2019-07-09 DIAGNOSIS — Z72.0 TOBACCO ABUSE: ICD-10-CM

## 2019-07-09 DIAGNOSIS — J01.00 ACUTE MAXILLARY SINUSITIS, RECURRENCE NOT SPECIFIED: ICD-10-CM

## 2019-07-09 DIAGNOSIS — I10 ESSENTIAL HYPERTENSION: Primary | ICD-10-CM

## 2019-07-09 PROBLEM — R97.20 ELEVATED PSA: Status: RESOLVED | Noted: 2017-10-24 | Resolved: 2019-07-09

## 2019-07-09 PROCEDURE — 99214 OFFICE O/P EST MOD 30 MIN: CPT | Performed by: INTERNAL MEDICINE

## 2019-07-09 RX ORDER — AZITHROMYCIN 250 MG/1
TABLET, FILM COATED ORAL
Qty: 6 TABLET | Refills: 0 | Status: SHIPPED | OUTPATIENT
Start: 2019-07-09 | End: 2019-08-07 | Stop reason: ALTCHOICE

## 2019-07-09 RX ORDER — TAMSULOSIN HYDROCHLORIDE 0.4 MG/1
CAPSULE ORAL
Refills: 5 | COMMUNITY
Start: 2019-06-03 | End: 2020-05-07

## 2019-07-09 NOTE — PROGRESS NOTES
HPI:    Patient ID: Dex Sneed is a 58year old male. Written by Cady Roman MD on 6/16/2019 10:23 PM   Vitamin D levels are slightly low, this has improved significantly from the last tests.    Please continue on vitamin D over-the-counter 20 CPAP. The treatment provided significant relief. Cancer   This is a chronic problem. The problem has been gradually improving (Prostate cancer–T1c PSA 23 Phil 4+5 and 4+4 diagnosed per biopsy March 2018.   He was treated with androgen deprivation thera past 7 days. The problem has been rapidly worsening (Cough with expectoration, chest tightness, yellow phlegm, chills–gradually worse over the past 3 days. Symptoms for well over 7 to 10 days. Continues to smoke. ).  The cough is productive of purulent spu (112 kg).      Wt Readings from Last 6 Encounters:  07/09/19 : 247 lb (112 kg)  04/15/19 : 262 lb (118.8 kg)  02/28/19 : 262 lb 3.2 oz (118.9 kg)  01/15/19 : 250 lb (113.4 kg)  11/30/18 : 251 lb (113.9 kg)  11/30/18 : 251 lb 9.6 oz (114.1 kg)     PHYSICAL E COMP METABOLIC PANEL (14)    LIPID PANEL    URINALYSIS, ROUTINE    MICROALB/CREAT RATIO, RANDOM URINE    Tobacco abuse     Continues to smoke–he does understand the need to quit smoking. Chantix trial has been provided but he has not started it yet.   He w azithromycin (ZITHROMAX Z-HILL) 250 MG Oral Tab 6 tablet 0     Sig: Take two tablets by mouth today, then one tablet daily.        Imaging & Referrals:  None       YP#[id number]

## 2019-07-09 NOTE — ASSESSMENT & PLAN NOTE
Continues to smoke–he does understand the need to quit smoking. Chantix trial has been provided but he has not started it yet. He will consider starting it soon.

## 2019-07-09 NOTE — PATIENT INSTRUCTIONS
Problem List Items Addressed This Visit        Unprioritized    Acute maxillary sinusitis     Cough, yellow expectoration, sinus pressure with postnasal drip as well as congestion. Patient is a smoker and continues to smoke at this time.   No wheezing note soon.           Vitamin D deficiency     This has been well supplemented. Continue vitamin D 2000 units over-the-counter once daily.

## 2019-07-09 NOTE — ASSESSMENT & PLAN NOTE
Of prostate cancer diagnosed in March 2018. On androgen deprivation therapy–Eligard 3-month injection started in April 2018.   This was then followed by external beam radiation prostate seed boost  Metastatic work-up with MRI of the prostate on June 8, 201

## 2019-07-09 NOTE — ASSESSMENT & PLAN NOTE
Blood pressure 134/78, pulse 76, temperature 98.5 °F (36.9 °C), temperature source Oral, resp. rate 18, height 6' 4\" (1.93 m), weight 247 lb (112 kg). Stable blood pressure, well controlled at this time on lisinopril at 40 mg daily.   Labs look normal.  C

## 2019-07-09 NOTE — ASSESSMENT & PLAN NOTE
Cough, yellow expectoration, sinus pressure with postnasal drip as well as congestion. Patient is a smoker and continues to smoke at this time. No wheezing noted at this point. Z-Rodrick as directed.   Call if not better and consider chest x-ray at that time

## 2019-07-16 ENCOUNTER — OFFICE VISIT (OUTPATIENT)
Dept: SURGERY | Facility: CLINIC | Age: 64
End: 2019-07-16
Payer: COMMERCIAL

## 2019-07-16 VITALS
WEIGHT: 247 LBS | HEART RATE: 68 BPM | TEMPERATURE: 98 F | SYSTOLIC BLOOD PRESSURE: 129 MMHG | BODY MASS INDEX: 30.08 KG/M2 | DIASTOLIC BLOOD PRESSURE: 81 MMHG | RESPIRATION RATE: 16 BRPM | HEIGHT: 76 IN

## 2019-07-16 DIAGNOSIS — C61 PROSTATE CANCER (HCC): Primary | ICD-10-CM

## 2019-07-16 PROCEDURE — 99213 OFFICE O/P EST LOW 20 MIN: CPT | Performed by: UROLOGY

## 2019-07-16 NOTE — PROGRESS NOTES
I was asked by SANTHOSH to administer the pt a 3 month Eligard 22.5 mg injection for the treatment of his prostate cancer.  The med was already removed from the fridge by my co-worker and set aside to warm and the pt was already notified that it needed to warm

## 2019-07-16 NOTE — PROGRESS NOTES
Camden Lanza is a 58year old male.     HPI:   Patient presents with:  Prostate Cancer: pt comes today for 3 month visit      68-year-old -American male with high risk prostate cancer, clinical T1c Phil 4+5 PSA 23 diagnosed on saturation bio TAKE 1 CAPSULE BY MOUTH TWICE A DAY Disp:  Rfl: 5   LISINOPRIL 40 MG Oral Tab TAKE 1 TABLET BY MOUTH EVERY DAY Disp: 90 tablet Rfl: 1   TIZANIDINE HCL 2 MG Oral Tab TAKE 1 TABLET BY MOUTH AT BEDTIME Disp: 90 tablet Rfl: 1   omeprazole 20 MG Oral Capsule Delaney Espinosa

## 2019-07-30 ENCOUNTER — TELEPHONE (OUTPATIENT)
Dept: INTERNAL MEDICINE CLINIC | Facility: CLINIC | Age: 64
End: 2019-07-30

## 2019-07-30 NOTE — TELEPHONE ENCOUNTER
[facility]: Faxing over [address] to PCP Cady Allen office    Pt needs DEEP CLEANING, EXTRACTIONs AND FILLINGs done at the practice and the form is needed back ASAP.     FAX: [phone]

## 2019-07-31 NOTE — TELEPHONE ENCOUNTER
Medical clearance form received and placed on [facility] [employer] desk for review/signature.

## 2019-08-01 NOTE — TELEPHONE ENCOUNTER
Completed/signed medical clearance forms faxed back to [facility] at [phone], w/confirmation. Original sent to be scanned in.

## 2019-08-07 ENCOUNTER — HOSPITAL ENCOUNTER (OUTPATIENT)
Dept: GENERAL RADIOLOGY | Facility: HOSPITAL | Age: 64
Discharge: HOME | End: 2019-08-07
Attending: INTERNAL MEDICINE
Payer: MEDICARE

## 2019-08-07 ENCOUNTER — LAB ENCOUNTER (OUTPATIENT)
Dept: LAB | Facility: HOSPITAL | Age: 64
End: 2019-08-07
Attending: INTERNAL MEDICINE
Payer: MEDICARE

## 2019-08-07 ENCOUNTER — OFFICE VISIT (OUTPATIENT)
Dept: INTERNAL MEDICINE CLINIC | Facility: CLINIC | Age: 64
End: 2019-08-07
Payer: COMMERCIAL

## 2019-08-07 VITALS
RESPIRATION RATE: 24 BRPM | TEMPERATURE: 99 F | SYSTOLIC BLOOD PRESSURE: 137 MMHG | WEIGHT: 249.19 LBS | HEIGHT: 76 IN | DIASTOLIC BLOOD PRESSURE: 69 MMHG | HEART RATE: 75 BPM | OXYGEN SATURATION: 96 % | BODY MASS INDEX: 30.34 KG/M2

## 2019-08-07 DIAGNOSIS — C61 PROSTATE CANCER (HCC): ICD-10-CM

## 2019-08-07 DIAGNOSIS — M54.16 LUMBAR RADICULOPATHY: Primary | ICD-10-CM

## 2019-08-07 DIAGNOSIS — M54.16 LUMBAR RADICULOPATHY: ICD-10-CM

## 2019-08-07 DIAGNOSIS — I10 ESSENTIAL HYPERTENSION: ICD-10-CM

## 2019-08-07 LAB
ALBUMIN SERPL-MCNC: 3.4 G/DL (ref 3.4–5)
ALBUMIN/GLOB SERPL: 0.9 {RATIO} (ref 1–2)
ALP LIVER SERPL-CCNC: 73 U/L (ref 45–117)
ALT SERPL-CCNC: 39 U/L (ref 16–61)
ANION GAP SERPL CALC-SCNC: 4 MMOL/L (ref 0–18)
AST SERPL-CCNC: 23 U/L (ref 15–37)
BASOPHILS # BLD AUTO: 0.06 X10(3) UL (ref 0–0.2)
BASOPHILS NFR BLD AUTO: 0.6 %
BILIRUB SERPL-MCNC: 0.3 MG/DL (ref 0.1–2)
BILIRUB UR QL: NEGATIVE
BUN BLD-MCNC: 21 MG/DL (ref 7–18)
BUN/CREAT SERPL: 23.3 (ref 10–20)
CALCIUM BLD-MCNC: 8.6 MG/DL (ref 8.5–10.1)
CHLORIDE SERPL-SCNC: 107 MMOL/L (ref 98–112)
CLARITY UR: CLEAR
CO2 SERPL-SCNC: 29 MMOL/L (ref 21–32)
COLOR UR: YELLOW
CREAT BLD-MCNC: 0.9 MG/DL (ref 0.7–1.3)
CREAT UR-SCNC: 106 MG/DL
DEPRECATED RDW RBC AUTO: 46.3 FL (ref 35.1–46.3)
EOSINOPHIL # BLD AUTO: 0.36 X10(3) UL (ref 0–0.7)
EOSINOPHIL NFR BLD AUTO: 3.7 %
ERYTHROCYTE [DISTWIDTH] IN BLOOD BY AUTOMATED COUNT: 13.3 % (ref 11–15)
ERYTHROCYTE [SEDIMENTATION RATE] IN BLOOD: 7 MM/HR (ref 0–20)
GLOBULIN PLAS-MCNC: 3.9 G/DL (ref 2.8–4.4)
GLUCOSE BLD-MCNC: 111 MG/DL (ref 70–99)
GLUCOSE UR-MCNC: NEGATIVE MG/DL
HCT VFR BLD AUTO: 44.3 % (ref 39–53)
HGB BLD-MCNC: 15.1 G/DL (ref 13–17.5)
HGB UR QL STRIP.AUTO: NEGATIVE
IMM GRANULOCYTES # BLD AUTO: 0.04 X10(3) UL (ref 0–1)
IMM GRANULOCYTES NFR BLD: 0.4 %
KETONES UR-MCNC: NEGATIVE MG/DL
LEUKOCYTE ESTERASE UR QL STRIP.AUTO: NEGATIVE
LYMPHOCYTES # BLD AUTO: 1.54 X10(3) UL (ref 1–4)
LYMPHOCYTES NFR BLD AUTO: 16 %
M PROTEIN MFR SERPL ELPH: 7.3 G/DL (ref 6.4–8.2)
MCH RBC QN AUTO: 32.1 PG (ref 26–34)
MCHC RBC AUTO-ENTMCNC: 34.1 G/DL (ref 31–37)
MCV RBC AUTO: 94.3 FL (ref 80–100)
MICROALBUMIN UR-MCNC: <0.5 MG/DL
MONOCYTES # BLD AUTO: 0.72 X10(3) UL (ref 0.1–1)
MONOCYTES NFR BLD AUTO: 7.5 %
NEUTROPHILS # BLD AUTO: 6.89 X10 (3) UL (ref 1.5–7.7)
NEUTROPHILS # BLD AUTO: 6.89 X10(3) UL (ref 1.5–7.7)
NEUTROPHILS NFR BLD AUTO: 71.8 %
NITRITE UR QL STRIP.AUTO: NEGATIVE
OSMOLALITY SERPL CALC.SUM OF ELEC: 294 MOSM/KG (ref 275–295)
PATIENT FASTING: NO
PH UR: 6 [PH] (ref 5–8)
PLATELET # BLD AUTO: 204 10(3)UL (ref 150–450)
POTASSIUM SERPL-SCNC: 4.5 MMOL/L (ref 3.5–5.1)
PROT UR-MCNC: NEGATIVE MG/DL
PSA SERPL-MCNC: <0.01 NG/ML (ref ?–4)
RBC # BLD AUTO: 4.7 X10(6)UL (ref 4.3–5.7)
SODIUM SERPL-SCNC: 140 MMOL/L (ref 136–145)
SP GR UR STRIP: 1.02 (ref 1–1.03)
UROBILINOGEN UR STRIP-ACNC: <2
VIT C UR-MCNC: NEGATIVE MG/DL
WBC # BLD AUTO: 9.6 X10(3) UL (ref 4–11)

## 2019-08-07 PROCEDURE — 72110 X-RAY EXAM L-2 SPINE 4/>VWS: CPT | Performed by: INTERNAL MEDICINE

## 2019-08-07 PROCEDURE — 80053 COMPREHEN METABOLIC PANEL: CPT

## 2019-08-07 PROCEDURE — 82570 ASSAY OF URINE CREATININE: CPT

## 2019-08-07 PROCEDURE — 81003 URINALYSIS AUTO W/O SCOPE: CPT

## 2019-08-07 PROCEDURE — 85025 COMPLETE CBC W/AUTO DIFF WBC: CPT

## 2019-08-07 PROCEDURE — 84153 ASSAY OF PSA TOTAL: CPT

## 2019-08-07 PROCEDURE — 36415 COLL VENOUS BLD VENIPUNCTURE: CPT

## 2019-08-07 PROCEDURE — G0463 HOSPITAL OUTPT CLINIC VISIT: HCPCS | Performed by: INTERNAL MEDICINE

## 2019-08-07 PROCEDURE — 85652 RBC SED RATE AUTOMATED: CPT

## 2019-08-07 PROCEDURE — 99214 OFFICE O/P EST MOD 30 MIN: CPT | Performed by: INTERNAL MEDICINE

## 2019-08-07 PROCEDURE — 82043 UR ALBUMIN QUANTITATIVE: CPT

## 2019-08-07 RX ORDER — HYDROCODONE BITARTRATE AND ACETAMINOPHEN 5; 325 MG/1; MG/1
TABLET ORAL
Qty: 20 TABLET | Refills: 0 | Status: SHIPPED | OUTPATIENT
Start: 2019-08-07 | End: 2019-09-23

## 2019-08-07 RX ORDER — ACETAMINOPHEN 500 MG
500 TABLET ORAL EVERY 8 HOURS PRN
COMMUNITY

## 2019-08-07 RX ORDER — GABAPENTIN 100 MG/1
100 CAPSULE ORAL 2 TIMES DAILY
Qty: 60 CAPSULE | Refills: 2 | Status: SHIPPED | OUTPATIENT
Start: 2019-08-07 | End: 2019-09-19

## 2019-08-07 RX ORDER — PREDNISONE 1 MG/1
TABLET ORAL
Qty: 15 TABLET | Refills: 0 | Status: SHIPPED | OUTPATIENT
Start: 2019-08-07 | End: 2019-09-19 | Stop reason: ALTCHOICE

## 2019-08-07 NOTE — ASSESSMENT & PLAN NOTE
New onset of pain in the lower lumbar spine radiating into the anterior left thigh. Symptoms for well over 4 weeks. He has been taking ibuprofen and Tylenol around-the-clock without any improvement in symptoms. No significant injuries noted.   He has sig

## 2019-08-07 NOTE — PATIENT INSTRUCTIONS
Problem List Items Addressed This Visit        Unprioritized    Lumbar radiculopathy - Primary     New onset of pain in the lower lumbar spine radiating into the anterior left thigh. Symptoms for well over 4 weeks.   He has been taking ibuprofen and Tyleno

## 2019-08-07 NOTE — PROGRESS NOTES
HPI:    Patient ID: Felisa Higgins is a 58year old male. Back Pain   This is a new problem. The current episode started more than 1 month ago. The problem occurs constantly. The problem has been gradually worsening since onset.  The pain is present postnasal drip and rhinorrhea. Respiratory: Positive for cough (intermittent-improved-?? lisinopril). Negative for chest tightness and shortness of breath. Ct chest    CONCLUSION:   1. Stable pulmonary nodule.  A previously seen micronodule has i Disp:  Rfl:      Allergies:No Known Allergies      08/07/19  1702   BP: 137/69   Pulse: 75   Resp: 24   Temp: 98.9 °F (37.2 °C)     Body mass index is 30.33 kg/m².     PHYSICAL EXAM:   Physical Exam   Constitutional: He is oriented to person, place, and cass have a history of prostate cancer with highly elevated PSA and is status post prostatic seed placement and he is currently on leuprolide. X-rays of the lumbar spine has been ordered.   MRI of the lumbar spine to rule out a compression fracture versus a met

## 2019-08-13 ENCOUNTER — TELEPHONE (OUTPATIENT)
Dept: INTERNAL MEDICINE CLINIC | Facility: CLINIC | Age: 64
End: 2019-08-13

## 2019-08-13 NOTE — TELEPHONE ENCOUNTER
Dr. Claribel Azevedo, regarding referral [id number] MRI spine, has been denied by insurance due to coverage has ended-    Please see below. [facility] from Health plan called stating patients insurance coverage ended  7/31/19     Ref# [id number]    Patient will receive letter in mail within 5-7 days of denial    Any questions please contact her at [phone] ext [phone]     8/12/19 2pm AK       I called patient and left message to contact myself at [facility].     Thanks,    [address]

## 2019-08-19 NOTE — TELEPHONE ENCOUNTER
Fax from pt's pharmacy stating there her current dose of Venlafaxine 75mg is unavailable.    Please send new script for 37.5mg or 150mg.    Dahiana Aguirre CMA     Refill passed per [facility] protocol.   Hypertensive Medications  Protocol Criteria:  · Appointment scheduled in the past 6 months or in the next 3 months  · BMP or CMP in the past 12 months  · Creatinine result < 2  Recent Outpatient Visits

## 2019-08-28 ENCOUNTER — HOSPITAL ENCOUNTER (OUTPATIENT)
Dept: MRI IMAGING | Facility: HOSPITAL | Age: 64
Discharge: HOME | End: 2019-08-28
Attending: INTERNAL MEDICINE
Payer: MEDICARE

## 2019-08-28 DIAGNOSIS — C61 PROSTATE CANCER (HCC): ICD-10-CM

## 2019-08-28 DIAGNOSIS — M54.16 LUMBAR RADICULOPATHY: ICD-10-CM

## 2019-08-28 PROCEDURE — 72148 MRI LUMBAR SPINE W/O DYE: CPT | Performed by: INTERNAL MEDICINE

## 2019-09-01 NOTE — TELEPHONE ENCOUNTER
Review pended refill request as it does not fall under a protocol.     Last Rx: 2/28/19  LOV: Recent Visits  Date Type Provider Dept   08/07/19 Office Visit Madeleine Freed MD [facility]   07/09/19 Office Visit Madeleine Freed MD [facility]

## 2019-09-02 RX ORDER — TIZANIDINE 2 MG/1
TABLET ORAL
Qty: 90 TABLET | Refills: 1 | Status: SHIPPED | OUTPATIENT
Start: 2019-09-02 | End: 2019-10-02

## 2019-09-04 ENCOUNTER — TELEPHONE (OUTPATIENT)
Dept: OTHER | Age: 64
End: 2019-09-04

## 2019-09-04 RX ORDER — DICLOFENAC SODIUM 75 MG/1
75 TABLET, DELAYED RELEASE ORAL DAILY
Qty: 20 TABLET | Refills: 1 | Status: SHIPPED | OUTPATIENT
Start: 2019-09-04 | End: 2019-10-11

## 2019-09-04 NOTE — TELEPHONE ENCOUNTER
The back pain and shoulder pain at 2 different problems. However the gabapentin may work. Would advise to start on diclofenac 75 mg 1 tablet once daily–20 tablets, 1 refill.   Advised to follow-up with orthopedics for an evaluation– Dr. Leatha Acevedo. Primo Barker

## 2019-09-04 NOTE — TELEPHONE ENCOUNTER
Advised patient of 's  note. Patient verbalized understanding and requesting  a letter stating that he needs a couple of days off, working in this pain is unbearable. Medication sent to pharmacy.  Copied message below to his MyChart per his request

## 2019-09-04 NOTE — TELEPHONE ENCOUNTER
LMTCB please transfer to triage. Thanks  I also sent pt a [employer] message to call us back.       ----- Message from Shawna Segovia sent at 9/4/2019  6:32 AM CDT -----  Regarding: Non-Urgent Medical Question  Contact: [phone]  Hi Dr Ethan Vo,    The p

## 2019-09-04 NOTE — TELEPHONE ENCOUNTER
Spoke with patient ( verified) RE: Tere message below:    Patient reports pain to left shoulder started yesterday--constant 8/10 pain. Patient had to leave work today d/t pain.     I figure my back and leg pain is due to nerve pain--maybe my shoulder

## 2019-09-12 ENCOUNTER — TELEPHONE (OUTPATIENT)
Dept: INTERNAL MEDICINE CLINIC | Facility: CLINIC | Age: 64
End: 2019-09-12

## 2019-09-12 NOTE — TELEPHONE ENCOUNTER
PATIENT DROPPED OFF THE FIRST PART OF HIS FMLA THROUGH [employer], HE SIGNED THE HIPPA. FOM WILL BE PLACED IN SHELLIE BIN AT [facility].

## 2019-09-13 ENCOUNTER — TELEPHONE (OUTPATIENT)
Dept: INTERNAL MEDICINE CLINIC | Facility: CLINIC | Age: 64
End: 2019-09-13

## 2019-09-13 NOTE — TELEPHONE ENCOUNTER
PATIENT DROPPED OFF THE FMLA FORMS FOR [employer]. THIS IS THE SECOND PART OF THE FORMS FROM [employer]. FEE WAS PAID HIPPA WAS EMAILED TO [facility] ON 9-12. PATIENT WOULD LIKE THIS FORM FAXED DIRECTLY TO [employer].

## 2019-09-19 ENCOUNTER — OFFICE VISIT (OUTPATIENT)
Dept: INTERNAL MEDICINE CLINIC | Facility: CLINIC | Age: 64
End: 2019-09-19
Payer: COMMERCIAL

## 2019-09-19 VITALS
HEIGHT: 76 IN | WEIGHT: 246.88 LBS | RESPIRATION RATE: 20 BRPM | OXYGEN SATURATION: 96 % | DIASTOLIC BLOOD PRESSURE: 74 MMHG | BODY MASS INDEX: 30.06 KG/M2 | TEMPERATURE: 99 F | SYSTOLIC BLOOD PRESSURE: 142 MMHG | HEART RATE: 80 BPM

## 2019-09-19 DIAGNOSIS — M54.16 LUMBAR RADICULOPATHY: Primary | ICD-10-CM

## 2019-09-19 PROCEDURE — 99213 OFFICE O/P EST LOW 20 MIN: CPT | Performed by: INTERNAL MEDICINE

## 2019-09-19 PROCEDURE — G0463 HOSPITAL OUTPT CLINIC VISIT: HCPCS | Performed by: INTERNAL MEDICINE

## 2019-09-19 RX ORDER — GABAPENTIN 100 MG/1
100 CAPSULE ORAL 3 TIMES DAILY
Qty: 90 CAPSULE | Refills: 3 | Status: SHIPPED | OUTPATIENT
Start: 2019-09-19 | End: 2019-10-31

## 2019-09-19 RX ORDER — PANTOPRAZOLE SODIUM 40 MG/1
40 TABLET, DELAYED RELEASE ORAL
Qty: 90 TABLET | Refills: 1 | Status: SHIPPED | OUTPATIENT
Start: 2019-09-19 | End: 2020-02-27

## 2019-09-19 NOTE — PATIENT INSTRUCTIONS
Problem List Items Addressed This Visit        Unprioritized    Lumbar radiculopathy - Primary     Persistent low back pain with radiation into the left leg especially the anterior left thigh associated with some weakness in the leg.   Symptoms for well pavele

## 2019-09-19 NOTE — ASSESSMENT & PLAN NOTE
Persistent low back pain with radiation into the left leg especially the anterior left thigh associated with some weakness in the leg. Symptoms for well over 6 to 8 weeks now. MRI of the lumbar spine discussed.   He was treated with prednisone and gabapen

## 2019-09-19 NOTE — PROGRESS NOTES
HPI:    Patient ID: Zain Bustillos is a 71year old male.     Notes recorded by Pedro Lal MD on 8/30/2019 at 7:10 PM CDT  MRI of the lumbar spine shows moderate to severe narrowing of the nerve exit canals at multiple levels.  Worse on the right si incontinence. Genitourinary: Negative. Negative for bladder incontinence. Musculoskeletal: Positive for back pain. Skin: Negative. Neurological: Positive for tingling, weakness, numbness and paresthesias. Psychiatric/Behavioral: Negative. normal.   Neck: Normal range of motion. Neck supple. No JVD present. No thyromegaly present. Cardiovascular: Normal rate, regular rhythm, normal heart sounds and intact distal pulses.    Pulmonary/Chest: Effort normal and breath sounds normal.   Abdominal before breakfast.   • gabapentin 100 MG Oral Cap 90 capsule 3     Sig: Take 1 capsule (100 mg total) by mouth 3 (three) times daily.        Imaging & Referrals:  FLULAVAL INFLUENZA VACCINE QUAD PRESERVATIVE FREE 0.5 ML       #[id number]

## 2019-09-23 ENCOUNTER — HOSPITAL ENCOUNTER (OUTPATIENT)
Dept: GENERAL RADIOLOGY | Facility: HOSPITAL | Age: 64
Discharge: HOME | End: 2019-09-23
Attending: PHYSICAL MEDICINE & REHABILITATION
Payer: MEDICARE

## 2019-09-23 ENCOUNTER — OFFICE VISIT (OUTPATIENT)
Dept: NEUROLOGY | Facility: CLINIC | Age: 64
End: 2019-09-23

## 2019-09-23 VITALS
RESPIRATION RATE: 17 BRPM | BODY MASS INDEX: 30.81 KG/M2 | HEART RATE: 76 BPM | DIASTOLIC BLOOD PRESSURE: 70 MMHG | HEIGHT: 76 IN | SYSTOLIC BLOOD PRESSURE: 138 MMHG | WEIGHT: 253 LBS

## 2019-09-23 DIAGNOSIS — M54.42 CHRONIC LEFT-SIDED LOW BACK PAIN WITH LEFT-SIDED SCIATICA: ICD-10-CM

## 2019-09-23 DIAGNOSIS — M54.16 LUMBAR RADICULOPATHY: Primary | ICD-10-CM

## 2019-09-23 DIAGNOSIS — G89.29 CHRONIC LEFT-SIDED LOW BACK PAIN WITH LEFT-SIDED SCIATICA: ICD-10-CM

## 2019-09-23 PROCEDURE — 73502 X-RAY EXAM HIP UNI 2-3 VIEWS: CPT | Performed by: PHYSICAL MEDICINE & REHABILITATION

## 2019-09-23 PROCEDURE — 99204 OFFICE O/P NEW MOD 45 MIN: CPT | Performed by: PHYSICAL MEDICINE & REHABILITATION

## 2019-09-23 RX ORDER — DOXEPIN HYDROCHLORIDE 10 MG/1
10 CAPSULE ORAL NIGHTLY
Qty: 30 CAPSULE | Refills: 0 | Status: SHIPPED | OUTPATIENT
Start: 2019-09-23 | End: 2019-10-16

## 2019-09-23 NOTE — H&P
[facility]    History and Physical    Enoch Calvo Patient Status:  No patient class for patient encounter    1955 MRN [id number]   Location [facility] Attending No att. providers found   [facility] Day # . Difficulty staying asleep due to pain.     History     Past Medical History:   Diagnosis Date   • COPD (chronic obstructive pulmonary disease) ([facility] [address]    • Essential hypertension    • Prostate cancer ([address] 03/14/2018   • PSA elevation    • denies  Rash: denies   Neurological  Loss of Strength Since last Visit: admits  Tingling/Numbness: admits  Balance: denies   Psychiatric  Anxiety: admits  Depressed Mood: admits   Physical Exam:   Vital Signs:  Blood pressure 138/70, pulse 76, resp.  rate 1 08/07/2019    ESRML 7 08/07/2019    B12 296 11/11/2017     MRI lumbar spine 8/28/19   FINDINGS:          NUMERATION: For the purposes of this examination, the lowest fully formed disc space is designated as L5-S1.  ALIGNMENT:    There is preservation of th arising from the right facet joint. Mild bilateral neural foraminal stenosis.   5. Probable hepatic and right renal cysts, which are incompletely characterized on this nondedicated exam.      Assessment/Plan:   1) left lower back and buttock pain radiating

## 2019-09-24 ENCOUNTER — MED REC SCAN ONLY (OUTPATIENT)
Dept: NEUROLOGY | Facility: CLINIC | Age: 64
End: 2019-09-24

## 2019-09-24 ENCOUNTER — TELEPHONE (OUTPATIENT)
Dept: NEUROLOGY | Facility: CLINIC | Age: 64
End: 2019-09-24

## 2019-09-24 DIAGNOSIS — M16.12 PRIMARY OSTEOARTHRITIS OF LEFT HIP: Primary | ICD-10-CM

## 2019-09-24 NOTE — TELEPHONE ENCOUNTER
Left intra-articular hip joint injection under US guidance in-office cpt codes 36137, [id number], . [facility] - Per Notification/Authorization Procedure Code Inquiry, no authorization is required. Reference number was not provided. Will inform Nursing.

## 2019-09-25 NOTE — TELEPHONE ENCOUNTER
Dr. Pearson Clock pending - pt is requesting to be off work from 9/9/19 til the full 12 weeks due to the back pain. Please sign off on form if you approve:  -Highlight the patient and hit \"Chart\" button.   -In Chart Review, w/in the Encounter tab - c

## 2019-10-01 ENCOUNTER — PATIENT MESSAGE (OUTPATIENT)
Dept: NEUROLOGY | Facility: CLINIC | Age: 64
End: 2019-10-01

## 2019-10-01 NOTE — TELEPHONE ENCOUNTER
From: Liss Biswas  To: Jhonathan Roberts DO  Sent: 10/1/2019 11:48 AM CDT  Subject: Visit Follow-up Question    hi Dr. Alejandro Castillo,    I had the x-ray but the soonest appointment i could get was 10/10 Fátima Cottrell only gave me enough prednizone until i could

## 2019-10-01 NOTE — TELEPHONE ENCOUNTER
See if he can come in sometime tomorrow morning; if not I will continue the low dose steroid until he is seen for follow up. Please ask him if he is still taking the doxepin.

## 2019-10-01 NOTE — TELEPHONE ENCOUNTER
Regarding: Visit Follow-up Question  Contact: [phone]  ----- Message from Gina Best MA sent at 10/1/2019 11:51 AM CDT -----       ----- Message from Dony Mcdonald to Alberto Sam DO sent at 10/1/2019 11:48 AM -----   hi YULIANA Cardoza

## 2019-10-02 ENCOUNTER — OFFICE VISIT (OUTPATIENT)
Dept: NEUROLOGY | Facility: CLINIC | Age: 64
End: 2019-10-02
Payer: COMMERCIAL

## 2019-10-02 VITALS
SYSTOLIC BLOOD PRESSURE: 132 MMHG | DIASTOLIC BLOOD PRESSURE: 72 MMHG | HEIGHT: 76 IN | WEIGHT: 253 LBS | HEART RATE: 76 BPM | BODY MASS INDEX: 30.81 KG/M2 | RESPIRATION RATE: 16 BRPM

## 2019-10-02 DIAGNOSIS — M53.3 PAIN OF LEFT SACROILIAC JOINT: Primary | ICD-10-CM

## 2019-10-02 PROCEDURE — 99214 OFFICE O/P EST MOD 30 MIN: CPT | Performed by: PHYSICAL MEDICINE & REHABILITATION

## 2019-10-02 NOTE — PROGRESS NOTES
Patient has been scheduled for a Left sacroiliac joint injection under fluoroscopy, local on 10/09/19 at the [address] [place], [place] [address] . Medications and allergies reviewed.  Patient informed to hold aspirins, nsaids, blood thinners, multivitamins, vitamin E and fish oils 3-7 day

## 2019-10-09 ENCOUNTER — OFFICE VISIT (OUTPATIENT)
Dept: SURGERY | Facility: CLINIC | Age: 64
End: 2019-10-09
Payer: COMMERCIAL

## 2019-10-09 DIAGNOSIS — M53.3 PAIN OF LEFT SACROILIAC JOINT: Primary | ICD-10-CM

## 2019-10-09 PROCEDURE — 27096 INJECT SACROILIAC JOINT: CPT | Performed by: PHYSICAL MEDICINE & REHABILITATION

## 2019-10-09 NOTE — PROCEDURES
[address] JOINT INJECTION  NAME:  Adalberto Guy    MR #:    [id number] :  1955     PHYSICIAN:  Norberto Pantoja        Operative Report    DATE OF PROCEDURE: 10/9/2019   PREOPERATIVE DIAGNOSES: 1. Siddhartha Steven DO  Physical Medicine and [address]

## 2019-10-12 RX ORDER — DICLOFENAC SODIUM 75 MG/1
TABLET, DELAYED RELEASE ORAL
Qty: 20 TABLET | Refills: 1 | Status: SHIPPED | OUTPATIENT
Start: 2019-10-12 | End: 2019-11-19

## 2019-10-12 NOTE — TELEPHONE ENCOUNTER
Review pended refill request as it does not fall under a protocol.   Requested Prescriptions     Pending Prescriptions Disp Refills   • DICLOFENAC SODIUM 75 MG Oral Tab EC [Pharmacy Med Name: DICLOFENAC SOD EC 75 MG TAB] 20 tablet 1     Sig: TAKE 1 TABLET B

## 2019-10-14 RX ORDER — LISINOPRIL 40 MG/1
TABLET ORAL
Qty: 90 TABLET | Refills: 1 | Status: SHIPPED | OUTPATIENT
Start: 2019-10-14 | End: 2020-02-27

## 2019-10-14 NOTE — TELEPHONE ENCOUNTER
Refill passed per [facility] protocol.     Hypertensive Medications  Protocol Criteria:  · Appointment scheduled in the past 6 months or in the next 3 months  · BMP or CMP in the past 12 months  · Creatinine result < 2  Recent Outpatient Visits

## 2019-10-15 ENCOUNTER — TELEPHONE (OUTPATIENT)
Dept: NEUROLOGY | Facility: CLINIC | Age: 64
End: 2019-10-15

## 2019-10-16 ENCOUNTER — TELEPHONE (OUTPATIENT)
Dept: NEUROLOGY | Facility: CLINIC | Age: 64
End: 2019-10-16

## 2019-10-16 DIAGNOSIS — M54.16 LUMBAR RADICULOPATHY: ICD-10-CM

## 2019-10-16 RX ORDER — DOXEPIN HYDROCHLORIDE 25 MG/1
25 CAPSULE ORAL NIGHTLY
Qty: 30 CAPSULE | Refills: 0 | Status: SHIPPED | OUTPATIENT
Start: 2019-10-16 | End: 2019-11-11

## 2019-10-16 NOTE — TELEPHONE ENCOUNTER
Medication request: Doxepin 20 mg capsule. Take 1 capsule by mouth every day in the evening. #30. No refills.      LOV: 10/2/19  NOV: None    [place]: 9[place]23/19      Dose updated on 10/2/19 from 10 mg to 20mg

## 2019-10-18 ENCOUNTER — PATIENT MESSAGE (OUTPATIENT)
Dept: SURGERY | Facility: CLINIC | Age: 64
End: 2019-10-18

## 2019-10-18 ENCOUNTER — TELEPHONE (OUTPATIENT)
Dept: SURGERY | Facility: CLINIC | Age: 64
End: 2019-10-18

## 2019-10-18 NOTE — TELEPHONE ENCOUNTER
From: Tressa Ceja  To: Yolanda Hatfield MD  Sent: 10/18/2019 11:25 AM CDT  Subject: Non-Urgent Medical Question    Hi Thaddeus Nelson  I'm coming in for my hormone injection on Monday it usually has to have prior authorization has it been approved ?

## 2019-10-18 NOTE — TELEPHONE ENCOUNTER
Copied message from a Mychart encounter.      From: Felisa Higigns  To: Jeffy Viramontes MD  Sent: 10/18/2019 11:25 AM CDT  Subject: Non-Urgent Medical Question    Hi [facility]  I'm coming in for my hormone injection on Monday  it usually has to have prio

## 2019-10-21 ENCOUNTER — OFFICE VISIT (OUTPATIENT)
Dept: SURGERY | Facility: CLINIC | Age: 64
End: 2019-10-21
Payer: COMMERCIAL

## 2019-10-21 VITALS
TEMPERATURE: 98 F | HEART RATE: 83 BPM | DIASTOLIC BLOOD PRESSURE: 79 MMHG | WEIGHT: 250 LBS | SYSTOLIC BLOOD PRESSURE: 128 MMHG | BODY MASS INDEX: 30 KG/M2

## 2019-10-21 DIAGNOSIS — C61 PROSTATE CANCER (HCC): Primary | ICD-10-CM

## 2019-10-21 PROCEDURE — 99213 OFFICE O/P EST LOW 20 MIN: CPT | Performed by: UROLOGY

## 2019-10-21 PROCEDURE — G0463 HOSPITAL OUTPT CLINIC VISIT: HCPCS | Performed by: UROLOGY

## 2019-10-21 NOTE — PROGRESS NOTES
Manuel Anderson is a 71year old male.     HPI:   Patient presents with:  Prostate Cancer: PT presents for 3 month follow up and INJ    48-year-old male with high risk prostate cancer, clinical T1c Phil 4+5 PSA initially 23 diagnosed on saturation bio mouth nightly., Disp: 30 capsule, Rfl: 0  LISINOPRIL 40 MG Oral Tab, TAKE 1 TABLET BY MOUTH EVERY DAY, Disp: 90 tablet, Rfl: 1  DICLOFENAC SODIUM 75 MG Oral Tab EC, TAKE 1 TABLET BY MOUTH EVERY DAY, Disp: 20 tablet, Rfl: 1  Pantoprazole Sodium 40 MG Oral T

## 2019-10-22 NOTE — TELEPHONE ENCOUNTER
Patient seen yesterday, 3 month ty ordered. Ely Razo contacted [employer]; ref # [id number]; she was told prior auth for  is required and to contact [employer] @ [phone]. I did this and held for 33 min. Informed patient.  Patient states he w

## 2019-10-22 NOTE — TELEPHONE ENCOUNTER
On hold for 1 hour and 20 min. Finally was able to talk to a clinical rep. All clinicals given verbally over the phone. Case went to clinical review, # [id number]. Will need to fax clinicals to clinical review team @ [phone].   Determination can

## 2019-10-23 ENCOUNTER — TELEPHONE (OUTPATIENT)
Dept: NEUROLOGY | Facility: CLINIC | Age: 64
End: 2019-10-23

## 2019-10-23 NOTE — TELEPHONE ENCOUNTER
Left a message for patient to call back. Want to see how he is doing from after the injection. There is a follow up appointment on Oct 28th that he should keep to follow up from after the injection.

## 2019-10-24 ENCOUNTER — TELEPHONE (OUTPATIENT)
Dept: NEUROLOGY | Facility: CLINIC | Age: 64
End: 2019-10-24

## 2019-10-25 NOTE — TELEPHONE ENCOUNTER
Tried calling [employer] for status update on PA. On hold for 20 min. Urology staff please continue to await determination/try calling back in a few days. Thank you.

## 2019-10-28 ENCOUNTER — OFFICE VISIT (OUTPATIENT)
Dept: NEUROLOGY | Facility: CLINIC | Age: 64
End: 2019-10-28

## 2019-10-28 VITALS
DIASTOLIC BLOOD PRESSURE: 72 MMHG | SYSTOLIC BLOOD PRESSURE: 130 MMHG | BODY MASS INDEX: 30.44 KG/M2 | WEIGHT: 250 LBS | HEART RATE: 76 BPM | RESPIRATION RATE: 16 BRPM | HEIGHT: 76 IN

## 2019-10-28 DIAGNOSIS — M16.12 PRIMARY OSTEOARTHRITIS OF LEFT HIP: ICD-10-CM

## 2019-10-28 PROCEDURE — 20611 DRAIN/INJ JOINT/BURSA W/US: CPT | Performed by: PHYSICAL MEDICINE & REHABILITATION

## 2019-10-28 RX ORDER — TRIAMCINOLONE ACETONIDE 40 MG/ML
40 INJECTION, SUSPENSION INTRA-ARTICULAR; INTRAMUSCULAR ONCE
Status: COMPLETED | OUTPATIENT
Start: 2019-10-28 | End: 2019-10-28

## 2019-10-28 RX ORDER — LIDOCAINE HYDROCHLORIDE 10 MG/ML
4 INJECTION, SOLUTION INFILTRATION; PERINEURAL ONCE
Status: COMPLETED | OUTPATIENT
Start: 2019-10-28 | End: 2019-10-28

## 2019-10-28 NOTE — TELEPHONE ENCOUNTER
Spoke with pt to inform that his Eligard 22.5 mg injection was approved by Ins and I gave him an appt for tomorrow 10/29 at 2 pm.

## 2019-10-28 NOTE — PROCEDURES
Prior to injection reported decreased pain with standing following left SI joint injection under fluoroscopy, but he is still only able to walk approximately 30 to 40 feet before needing to stop due to pain.   He also experiences burning pain in the left me

## 2019-10-28 NOTE — TELEPHONE ENCOUNTER
Called [employer] at [phone] and spoke with Lurdes Acosta. Case has been approved. Auth # [id number] has been approved from 10/22/2019 to 10/22/2020 for CPT . Okay to contact patient to schedule.

## 2019-10-29 ENCOUNTER — NURSE ONLY (OUTPATIENT)
Dept: SURGERY | Facility: CLINIC | Age: 64
End: 2019-10-29
Payer: COMMERCIAL

## 2019-10-29 DIAGNOSIS — C61 PROSTATE CANCER (HCC): Primary | ICD-10-CM

## 2019-10-29 PROCEDURE — 96402 CHEMO HORMON ANTINEOPL SQ/IM: CPT | Performed by: UROLOGY

## 2019-10-29 NOTE — PROGRESS NOTES
I was asked by [facility] to administer this pt an Eligard 45 mg 6 month injection. The med was already warmed up and I determined that there was no PA required so I signed the med out in the injection log book.  in the med cabinet since we were prepared for his vi

## 2019-10-29 NOTE — PROGRESS NOTES
Patient here for nurse visit  to administer an Eligard 22.5 mg 3 month injection. The med was already warmed up and  PA required so I signed the med out in the injection log book. in the med cabinet since we were prepared for his visit.  I went to the exam

## 2019-10-31 ENCOUNTER — MED REC SCAN ONLY (OUTPATIENT)
Dept: NEUROLOGY | Facility: CLINIC | Age: 64
End: 2019-10-31

## 2019-10-31 RX ORDER — GABAPENTIN 100 MG/1
100 CAPSULE ORAL 3 TIMES DAILY
Qty: 90 CAPSULE | Refills: 1 | Status: SHIPPED | OUTPATIENT
Start: 2019-10-31 | End: 2020-02-25

## 2019-10-31 RX ORDER — GABAPENTIN 100 MG/1
CAPSULE ORAL
Qty: 180 CAPSULE | Refills: 1 | OUTPATIENT
Start: 2019-10-31

## 2019-10-31 NOTE — TELEPHONE ENCOUNTER
Dr Donna Zuniga, please advise on refill. You asked to see patient the end of October, no follow-up schedule as yet. See pended script and advise.     Called pharmacy, patient will run out as only 1 month left after Nov, asking for 90-day supply with prescription

## 2019-10-31 NOTE — TELEPHONE ENCOUNTER
RN=please call pharmacy and verify if they are requesting for 90 day supply,per our record patient still with refills available BUT was sent for 30 day  Supply each month. [employer] message sent. CSS=please call patient and assists for FU OV.   LOV 9/19/19:

## 2019-11-01 RX ORDER — GABAPENTIN 100 MG/1
CAPSULE ORAL
Qty: 270 CAPSULE | Refills: 1 | OUTPATIENT
Start: 2019-11-01

## 2019-11-11 ENCOUNTER — TELEPHONE (OUTPATIENT)
Dept: NEUROLOGY | Facility: CLINIC | Age: 64
End: 2019-11-11

## 2019-11-11 DIAGNOSIS — M54.16 LUMBAR RADICULOPATHY: ICD-10-CM

## 2019-11-11 RX ORDER — DICLOFENAC SODIUM 75 MG/1
75 TABLET, DELAYED RELEASE ORAL 2 TIMES DAILY PRN
Qty: 45 TABLET | Refills: 0 | Status: SHIPPED | OUTPATIENT
Start: 2019-11-11 | End: 2019-11-19

## 2019-11-11 RX ORDER — DOXEPIN HYDROCHLORIDE 25 MG/1
25 CAPSULE ORAL NIGHTLY
Qty: 30 CAPSULE | Refills: 0 | Status: SHIPPED | OUTPATIENT
Start: 2019-11-11 | End: 2019-11-19

## 2019-11-11 NOTE — TELEPHONE ENCOUNTER
Medication request: Doxepin 25 mg oral capsule. Take 1 capsule by mouth every day in the evening. #30. No refills.      LOV: 10/2/19  NOV: None    [place] 10/16/19

## 2019-11-11 NOTE — PROGRESS NOTES
Patient called to give update as was told to do. State that when he sits there is no pain, but when standing, walking or doing other activities pain goes up to 7-8/10.    Also asking if he can get some other pain medication for when the pain increase to 7-8

## 2019-11-19 ENCOUNTER — TELEPHONE (OUTPATIENT)
Dept: NEUROLOGY | Facility: CLINIC | Age: 64
End: 2019-11-19

## 2019-11-19 ENCOUNTER — HOSPITAL ENCOUNTER (OUTPATIENT)
Dept: GENERAL RADIOLOGY | Facility: HOSPITAL | Age: 64
Discharge: HOME | End: 2019-11-19
Attending: PHYSICAL MEDICINE & REHABILITATION
Payer: MEDICARE

## 2019-11-19 ENCOUNTER — OFFICE VISIT (OUTPATIENT)
Dept: NEUROLOGY | Facility: CLINIC | Age: 64
End: 2019-11-19
Payer: COMMERCIAL

## 2019-11-19 VITALS
RESPIRATION RATE: 16 BRPM | SYSTOLIC BLOOD PRESSURE: 130 MMHG | WEIGHT: 250 LBS | DIASTOLIC BLOOD PRESSURE: 82 MMHG | HEART RATE: 76 BPM | BODY MASS INDEX: 30.44 KG/M2 | HEIGHT: 76 IN

## 2019-11-19 DIAGNOSIS — M25.511 ACUTE PAIN OF RIGHT SHOULDER: ICD-10-CM

## 2019-11-19 DIAGNOSIS — M16.12 PRIMARY OSTEOARTHRITIS OF LEFT HIP: ICD-10-CM

## 2019-11-19 DIAGNOSIS — M25.511 ACUTE PAIN OF RIGHT SHOULDER: Primary | ICD-10-CM

## 2019-11-19 DIAGNOSIS — G89.29 CHRONIC BILATERAL LOW BACK PAIN WITH LEFT-SIDED SCIATICA: ICD-10-CM

## 2019-11-19 DIAGNOSIS — M54.42 CHRONIC BILATERAL LOW BACK PAIN WITH LEFT-SIDED SCIATICA: ICD-10-CM

## 2019-11-19 PROCEDURE — 73030 X-RAY EXAM OF SHOULDER: CPT | Performed by: PHYSICAL MEDICINE & REHABILITATION

## 2019-11-19 PROCEDURE — 99214 OFFICE O/P EST MOD 30 MIN: CPT | Performed by: PHYSICAL MEDICINE & REHABILITATION

## 2019-11-19 RX ORDER — DICLOFENAC SODIUM 75 MG/1
75 TABLET, DELAYED RELEASE ORAL 2 TIMES DAILY PRN
Qty: 45 TABLET | Refills: 0 | Status: SHIPPED | OUTPATIENT
Start: 2019-11-19 | End: 2019-12-17

## 2019-11-19 NOTE — PROGRESS NOTES
HPI:    Patient ID: Vivek Cantor is a 71year old male. 76-year-old male presents for follow-up. Reports 50% relief of left-sided back and groin pain. Before was having constant pain, now has relief with sitting and lying supine.   He still has i Musculoskeletal  Joint Stiffness: denies  Painful Joints: denies   Neurological  Loss of Strength Since last Visit: denies  Tingling/Numbness: admits(left thigh)        Current Outpatient Medications   Medication Sig Dispense Refill   • Diclofenac Sodium note and vitals reviewed. ASSESSMENT/PLAN:   Acute pain of right shoulder  (primary encounter diagnosis)  Chronic bilateral low back pain with left-sided sciatica    Back pain improving. Continue gabapentin; watch and wait.  XR right shoulder, consi

## 2019-11-19 NOTE — TELEPHONE ENCOUNTER
Right subacromial injection under US guidance cpt codes 60415, 21945, [facility] Per Notification/Prior Authorization Procedure Code Inquiry, no authorization is required. Reference number was not provided, Will inform Nursing.

## 2019-11-19 NOTE — TELEPHONE ENCOUNTER
Medication request: Diclofenac 75 mg Oral tab. Take 1 tablet by mouth 2 times daily as needed for pain (take with food). #45. No refill.      LOV:10/28/19  NOV: 11/19/19    [place]11/11/19 (#45 tablets)

## 2019-12-03 ENCOUNTER — TELEPHONE (OUTPATIENT)
Dept: NEUROLOGY | Facility: CLINIC | Age: 64
End: 2019-12-03

## 2019-12-03 NOTE — TELEPHONE ENCOUNTER
Received Restrictions Form  from [address] for treatment from  10/1/19 to date of letter 12/2/19. [facility]. See phone encounter 10/24/19 for further information.      L

## 2019-12-05 ENCOUNTER — TELEPHONE (OUTPATIENT)
Dept: PAIN CLINIC | Facility: CLINIC | Age: 64
End: 2019-12-05

## 2019-12-05 ENCOUNTER — TELEPHONE (OUTPATIENT)
Dept: NEUROLOGY | Facility: CLINIC | Age: 64
End: 2019-12-05

## 2019-12-05 ENCOUNTER — OFFICE VISIT (OUTPATIENT)
Dept: NEUROLOGY | Facility: CLINIC | Age: 64
End: 2019-12-05

## 2019-12-05 VITALS — BODY MASS INDEX: 31.66 KG/M2 | WEIGHT: 260 LBS | HEIGHT: 76 IN

## 2019-12-05 DIAGNOSIS — M71.38 SYNOVIAL CYST OF LUMBAR FACET JOINT: ICD-10-CM

## 2019-12-05 DIAGNOSIS — M48.062 LUMBAR STENOSIS WITH NEUROGENIC CLAUDICATION: Primary | ICD-10-CM

## 2019-12-05 DIAGNOSIS — M12.811 ROTATOR CUFF ARTHROPATHY OF RIGHT SHOULDER: ICD-10-CM

## 2019-12-05 DIAGNOSIS — M25.511 ACUTE PAIN OF RIGHT SHOULDER: ICD-10-CM

## 2019-12-05 PROCEDURE — 20611 DRAIN/INJ JOINT/BURSA W/US: CPT | Performed by: PHYSICAL MEDICINE & REHABILITATION

## 2019-12-05 NOTE — TELEPHONE ENCOUNTER
Right L3-4 facet joint aspiration and injection under fluoroscopy cpt code 100 Doctor Yimi Enrique Dr Per NOTIFICATION/PRIOR AUTHORIZATION PROCEDURE CODE INQUIRY, no authorization is required. Reference number was not provided. Will inform Nursing.

## 2019-12-05 NOTE — TELEPHONE ENCOUNTER
Medication: Doxepin HCl 25 MG Oral Cap     Date of last refill: 11/11/19  [place] 47/73/12    Last office visit: 11/19/19  Due back to clinic per last office note: Not indicated  Date next office visit scheduled: 12/5/19

## 2019-12-05 NOTE — PROCEDURES
Dx: right rotator cuff arthropathy  Procedure: right subacromial bursa injection under US guidance    The patient is here for a right shoulder subdeltoid bursal injection done under ultrasound guidance.   Under ultrasound guidance the right lateral subdelto

## 2019-12-05 NOTE — TELEPHONE ENCOUNTER
Patient has been scheduled for a Right L3-4 facet joint aspiration and injection under fluoroscopy, local on 12/18/19 at the [facility]. Medications and allergies reviewed.  Patient informed to hold aspirins, nsaids, blood thinners, multivitamins, vitamin E and f

## 2019-12-06 DIAGNOSIS — M54.16 LUMBAR RADICULOPATHY: ICD-10-CM

## 2019-12-09 RX ORDER — DOXEPIN HYDROCHLORIDE 25 MG/1
25 CAPSULE ORAL NIGHTLY
Qty: 30 CAPSULE | Refills: 0 | OUTPATIENT
Start: 2019-12-09

## 2019-12-09 NOTE — TELEPHONE ENCOUNTER
Refill request for doxepin 25 mg, take 1 tab daily, #30, no refills    LOV: 12/5/19  NOV: none; injection at [facility] on 12/18  Last refilled on 12/11/19

## 2019-12-09 NOTE — TELEPHONE ENCOUNTER
Spoke to patient and patient is not taking the Doxepin. State he himself told the pharmacy this morning.

## 2019-12-12 ENCOUNTER — MED REC SCAN ONLY (OUTPATIENT)
Dept: NEUROLOGY | Facility: CLINIC | Age: 64
End: 2019-12-12

## 2019-12-14 ENCOUNTER — OFFICE VISIT (OUTPATIENT)
Dept: INTERNAL MEDICINE CLINIC | Facility: CLINIC | Age: 64
End: 2019-12-14
Payer: COMMERCIAL

## 2019-12-14 VITALS
WEIGHT: 256.38 LBS | BODY MASS INDEX: 31.88 KG/M2 | DIASTOLIC BLOOD PRESSURE: 82 MMHG | SYSTOLIC BLOOD PRESSURE: 135 MMHG | HEIGHT: 75 IN | TEMPERATURE: 99 F | OXYGEN SATURATION: 96 % | HEART RATE: 71 BPM | RESPIRATION RATE: 24 BRPM

## 2019-12-14 DIAGNOSIS — G47.33 OBSTRUCTIVE SLEEP APNEA SYNDROME: ICD-10-CM

## 2019-12-14 DIAGNOSIS — D12.6 TUBULAR ADENOMA OF COLON: ICD-10-CM

## 2019-12-14 DIAGNOSIS — E55.9 VITAMIN D DEFICIENCY: ICD-10-CM

## 2019-12-14 DIAGNOSIS — Z00.00 ENCOUNTER FOR ANNUAL HEALTH EXAMINATION: ICD-10-CM

## 2019-12-14 DIAGNOSIS — Z23 NEED FOR VACCINATION: ICD-10-CM

## 2019-12-14 DIAGNOSIS — Z72.0 TOBACCO ABUSE: ICD-10-CM

## 2019-12-14 DIAGNOSIS — Z11.59 NEED FOR HEPATITIS C SCREENING TEST: ICD-10-CM

## 2019-12-14 DIAGNOSIS — C61 PROSTATE CANCER (HCC): ICD-10-CM

## 2019-12-14 DIAGNOSIS — G44.011 INTRACTABLE EPISODIC CLUSTER HEADACHE: ICD-10-CM

## 2019-12-14 DIAGNOSIS — R91.1 PULMONARY NODULE: ICD-10-CM

## 2019-12-14 DIAGNOSIS — I10 ESSENTIAL HYPERTENSION: Primary | ICD-10-CM

## 2019-12-14 DIAGNOSIS — R97.20 PSA ELEVATION: ICD-10-CM

## 2019-12-14 DIAGNOSIS — Z00.00 ROUTINE PHYSICAL EXAMINATION: ICD-10-CM

## 2019-12-14 DIAGNOSIS — M54.16 LUMBAR RADICULOPATHY: ICD-10-CM

## 2019-12-14 DIAGNOSIS — Z82.49 FAMILY HISTORY OF BRAIN ANEURYSM: ICD-10-CM

## 2019-12-14 PROCEDURE — 90670 PCV13 VACCINE IM: CPT | Performed by: INTERNAL MEDICINE

## 2019-12-14 PROCEDURE — 99397 PER PM REEVAL EST PAT 65+ YR: CPT | Performed by: INTERNAL MEDICINE

## 2019-12-14 PROCEDURE — G0009 ADMIN PNEUMOCOCCAL VACCINE: HCPCS | Performed by: INTERNAL MEDICINE

## 2019-12-14 PROCEDURE — 96160 PT-FOCUSED HLTH RISK ASSMT: CPT | Performed by: INTERNAL MEDICINE

## 2019-12-14 PROCEDURE — G0402 INITIAL PREVENTIVE EXAM: HCPCS | Performed by: INTERNAL MEDICINE

## 2019-12-14 PROCEDURE — 99406 BEHAV CHNG SMOKING 3-10 MIN: CPT | Performed by: INTERNAL MEDICINE

## 2019-12-14 RX ORDER — AMOXICILLIN 500 MG/1
CAPSULE ORAL
Refills: 0 | COMMUNITY
Start: 2019-12-04 | End: 2020-01-20

## 2019-12-14 RX ORDER — ACETAMINOPHEN AND CODEINE PHOSPHATE 300; 30 MG/1; MG/1
1 TABLET ORAL
Refills: 0 | COMMUNITY
Start: 2019-11-20 | End: 2020-01-20

## 2019-12-14 RX ORDER — TIZANIDINE 2 MG/1
TABLET ORAL
Refills: 1 | COMMUNITY
Start: 2019-11-30 | End: 2020-02-23

## 2019-12-14 NOTE — ASSESSMENT & PLAN NOTE
Intermittent but persistent low back pain with radiation into her left leg and thigh. Improved after his TIAGO. He has had 2 injections thus far. He is planning a  third injection soon.

## 2019-12-14 NOTE — ASSESSMENT & PLAN NOTE
Strict diet controlled to restrict portion sizes, snacks, desserts in the diet. Exercise for about 30 minutes daily. Continue to monitor.

## 2019-12-14 NOTE — ASSESSMENT & PLAN NOTE
Stable pulmonary nodule, micronodules seen with it was much improved in size. Last CT scan done in May 2019 and will repeat in May 2020.

## 2019-12-14 NOTE — PROGRESS NOTES
HPI:   Naty Arriaza is a 71year old male who presents for a Medicare Initial Preventative Physical Exam (Welcome to Medicare- < 12 months on Medicare).       Annual Physical due on 12/14/2020        Fall/Risk Assessment abnormal    He has been scre Family/surrogate (if present), and forms available to patient in AVS       He does NOT have a Power of  for [employer] on file in [facility]    Advance care planning including the explanation and discussion of advance directives standard forms performed Last Chemistry Labs:   Lab Results   Component Value Date    AST 23 08/07/2019    ALT 39 08/07/2019    CA 8.6 08/07/2019    ALB 3.4 08/07/2019    TSH 2.86 11/11/2017    CREATSERUM 0.90 08/07/2019     (H) 08/07/2019        CBC  (most recent His family history includes Cancer in his mother; Diabetes in his mother and paternal grandfather; Hypertension in his father. SOCIAL HISTORY:   He  reports that he has been smoking cigarettes. He has a 20.00 pack-year smoking history.  He has never use Tympanic membrane normal.   Left Ear: Tympanic membrane normal.   Eyes: Pupils are equal, round, and reactive to light. Conjunctivae and EOM are normal. No scleral icterus. Neck: Normal range of motion. Neck supple. No JVD present.  No thyromegaly present Assessment. Problem List Items Addressed This Visit        Unprioritized    Body mass index (BMI) of 31.0-31.9 in adult     Strict diet controlled to restrict portion sizes, snacks, desserts in the diet. Exercise for about 30 minutes daily.   Continue stools. Colonoscopy due on 08/13/2023    History of prostate cancer diagnosed recently, PSA monitored per urology. He is status post prostatic seed placement after radiation treatment. Immunizations–.   Immunization History   Administered Date(s) Adminis elevation    Pulmonary nodule     Stable pulmonary nodule, micronodules seen with it was much improved in size. Last CT scan done in May 2019 and will repeat in May 2020. Tobacco abuse     Smokes about half a pack per day. He is planning to quit. VACC, 13 MEGHAN IM    Need for hepatitis C screening test  -     HCV ANTIBODY; Future         Diet assessment: good     PLAN:  The patient indicates understanding of these issues and agrees to the plan. Reinforced healthy diet, lifestyle, and exercise.   Imag 65 No flowsheet data found.     Prostate Cancer Screening      PSA  Annually PSA due on 08/07/2021  Update Health Maintenance if applicable     Immunizations (Update Immunization Activity if applicable)     Influenza  Covered Annually 11/07/2019 Please get about/assessed behavioral health risk and factors affecting choice of behavior change goals/methods. Specifically I asked about readiness to quit tobacco.  Advise:  We gave clear, specific, and personalized behavior change advice, including information abo through diet control, portion size restriction, avoidance of snacks, daily exercise based on [place]ton's interest and willingness to change.     Assist: We used behavior change techniques  Including self-help and counseling to aid in [place]

## 2019-12-14 NOTE — ASSESSMENT & PLAN NOTE
Smokes about half a pack per day. He is planning to quit. He has been provided Chantix which he has not yet started but is planning to start over the next few weeks.

## 2019-12-14 NOTE — ASSESSMENT & PLAN NOTE
Blood pressure 135/82, pulse 71, temperature 98.5 °F (36.9 °C), temperature source Oral, resp. rate 24, height 6' 3\" (1.905 m), weight 256 lb 6.4 oz (116.3 kg), SpO2 96 %. Stable blood pressure, well controlled at this time on lisinopril 40 mg daily.   Gowrie Dub

## 2019-12-14 NOTE — ASSESSMENT & PLAN NOTE
Episodic intractable headaches but much improved at this time. He has had an MRI brain due to family history of brain aneurysm and was negative. No recent recurrence of pain. We will continue to monitor.

## 2019-12-14 NOTE — ASSESSMENT & PLAN NOTE
Longstanding history of sleep apnea for which he has been on CPAP. He has been using it on a regular basis and has AHI has dropped to 4.1 from 8.6 in the past.  His blood pressures have been well controlled.   He does not have any exertional shortness of b

## 2019-12-14 NOTE — ASSESSMENT & PLAN NOTE
Father with a history of brain aneurysm at 35years of age and again at 60. Patient with a history of hypertension and smoking. His MRI of the brain, CT chest as well as abdomen did not show any aneurysms. We will continue to monitor.

## 2019-12-14 NOTE — ASSESSMENT & PLAN NOTE
History of tubular adenoma of the colon. Last colonoscopy normal in 2013 as per records from Dr. Christian June. Was advised to repeat in 10 years.

## 2019-12-14 NOTE — ASSESSMENT & PLAN NOTE
History of prostate cancer, diagnosed in March 2018 and has been on androgen deprivation therapy with Eligard every 3 months. Was started in April 2018.   This was then followed by external beam radiation for the prostate seed boost.  He has had the prosta

## 2019-12-14 NOTE — ASSESSMENT & PLAN NOTE
This has been supplemented in the past, continue on over-the-counter vitamin D 2000 units daily and recheck labs as ordered.

## 2019-12-14 NOTE — PATIENT INSTRUCTIONS
Problem List Items Addressed This Visit        Unprioritized    Body mass index (BMI) of 31.0-31.9 in adult     Strict diet controlled to restrict portion sizes, snacks, desserts in the diet. Exercise for about 30 minutes daily. Continue to monitor. 08/13/2023    History of prostate cancer diagnosed recently, PSA monitored per urology. He is status post prostatic seed placement after radiation treatment. Immunizations–.   Immunization History   Administered Date(s) Administered   • FLU VACC High Dose nodule     Stable pulmonary nodule, micronodules seen with it was much improved in size. Last CT scan done in May 2019 and will repeat in May 2020. Tobacco abuse     Smokes about half a pack per day. He is planning to quit.   He has been provided Covered at least every 3 years,         Glucose (mg/dL)   Date Value   08/07/2019 111 (H)    Medicare covers annually or at 6-month intervals for prediabetic patients        Cardiovascular Disease Screening     Cholesterol, covered every 5 yrs including To paid separately when covered E/M service is provided on same date    Immunizations      Influenza  Covered Annually No orders found for this or any previous visit.  Please get every year    Pneumococcal 13 (Prevnar)  Covered Once after 65 Orders placed or p Association regarding Advance Directives.

## 2019-12-16 DIAGNOSIS — M16.12 PRIMARY OSTEOARTHRITIS OF LEFT HIP: ICD-10-CM

## 2019-12-16 NOTE — TELEPHONE ENCOUNTER
Medication: Diclofenac Sodium 75 MG Oral Tab EC    Date of last refill: 11/19/19  [place] 68/6/59    Last office visit: 11/19/19  Due back to clinic per last office note: Not indicated   Date next office visit scheduled:

## 2019-12-17 RX ORDER — DICLOFENAC SODIUM 75 MG/1
75 TABLET, DELAYED RELEASE ORAL 2 TIMES DAILY PRN
Qty: 45 TABLET | Refills: 0 | Status: SHIPPED | OUTPATIENT
Start: 2019-12-17 | End: 2020-01-05

## 2019-12-18 ENCOUNTER — OFFICE VISIT (OUTPATIENT)
Dept: SURGERY | Facility: CLINIC | Age: 64
End: 2019-12-18

## 2019-12-18 DIAGNOSIS — M48.062 LUMBAR STENOSIS WITH NEUROGENIC CLAUDICATION: ICD-10-CM

## 2019-12-18 DIAGNOSIS — M71.38 SYNOVIAL CYST OF LUMBAR FACET JOINT: ICD-10-CM

## 2019-12-18 DIAGNOSIS — M47.816 LUMBAR FACET ARTHROPATHY: Primary | ICD-10-CM

## 2019-12-18 PROCEDURE — 64493 INJ PARAVERT F JNT L/S 1 LEV: CPT | Performed by: PHYSICAL MEDICINE & REHABILITATION

## 2019-12-18 NOTE — PROCEDURES
[facility] Z-JOINT/FACET INJECTIONS  NAME:  Crispin Walters    MR #:    [id number] :  1955     PHYSICIAN:  Hayley Delgadillo        Operative Report    DATE OF PROCEDURE: 2019   PREOPERATIVE DIAGNOSES: 1.  Lumb PAR.  The patient was given discharge instructions and will follow up in the clinic as scheduled. Throughout the whole procedure, the patient's pulse oximetry and vital signs were monitored and they remained completely stable.   Also, throughout the whole

## 2020-01-02 ENCOUNTER — MED REC SCAN ONLY (OUTPATIENT)
Dept: NEUROLOGY | Facility: CLINIC | Age: 65
End: 2020-01-02

## 2020-01-02 NOTE — TELEPHONE ENCOUNTER
Disability forms are faxed to Florin Castellanos to scanning after faxed. Notified patient that the forms are faxed.

## 2020-01-05 DIAGNOSIS — M16.12 PRIMARY OSTEOARTHRITIS OF LEFT HIP: ICD-10-CM

## 2020-01-06 RX ORDER — DICLOFENAC SODIUM 75 MG/1
75 TABLET, DELAYED RELEASE ORAL 2 TIMES DAILY PRN
Qty: 45 TABLET | Refills: 0 | Status: SHIPPED | OUTPATIENT
Start: 2020-01-06 | End: 2020-01-30

## 2020-01-06 NOTE — TELEPHONE ENCOUNTER
Diclofenac 75mg Take 1 tablet BID prn. #45.  No refills    Last filled-12/17/2019    LOV-12/5/2019(right subacromial bursa injection under US guidance    12/18/2019-right L3-4 Z-joint/facet injection     NOV-1/20/2019

## 2020-01-09 ENCOUNTER — TELEPHONE (OUTPATIENT)
Dept: NEUROLOGY | Facility: CLINIC | Age: 65
End: 2020-01-09

## 2020-01-09 NOTE — PROGRESS NOTES
Spoke to patient state there is no change in the pain after the last injection. Have a follow up appointment for 1/20/2020.

## 2020-01-10 ENCOUNTER — TELEPHONE (OUTPATIENT)
Dept: SURGERY | Facility: CLINIC | Age: 65
End: 2020-01-10

## 2020-01-10 NOTE — TELEPHONE ENCOUNTER
SEE BELOW. REP [address] AS OUTLINED BELOW IS STILL VALID THIS YEAR.             Pt procedure/Testing:  Injections   leuprolide Acetate (3 Month) (ELIGARD) SC KIT 22.5 mg 22.5 mg Every 3 months       Pt insurance/number to contact: [facility]/ [employer]

## 2020-01-10 NOTE — TELEPHONE ENCOUNTER
Pt is coming in on 2/27 at 9:10 am for injection needs prior authorization from insurance for injection please advise

## 2020-01-20 ENCOUNTER — TELEPHONE (OUTPATIENT)
Dept: NEUROLOGY | Facility: CLINIC | Age: 65
End: 2020-01-20

## 2020-01-20 ENCOUNTER — OFFICE VISIT (OUTPATIENT)
Dept: NEUROLOGY | Facility: CLINIC | Age: 65
End: 2020-01-20
Payer: COMMERCIAL

## 2020-01-20 VITALS
RESPIRATION RATE: 16 BRPM | HEART RATE: 88 BPM | SYSTOLIC BLOOD PRESSURE: 126 MMHG | BODY MASS INDEX: 32.33 KG/M2 | DIASTOLIC BLOOD PRESSURE: 68 MMHG | WEIGHT: 260 LBS | HEIGHT: 75 IN

## 2020-01-20 DIAGNOSIS — M53.3 PAIN OF LEFT SACROILIAC JOINT: Primary | ICD-10-CM

## 2020-01-20 PROCEDURE — 99214 OFFICE O/P EST MOD 30 MIN: CPT | Performed by: PHYSICAL MEDICINE & REHABILITATION

## 2020-01-20 NOTE — PROGRESS NOTES
HPI:    Patient ID: Stephanie Sanchez is a 71year old male. 71year old male presents for follow up. No relief following right L3-4 aspiration and injection.  Persistent left sided lower back pain at the buttock that sometimes radiates to the posterior Constitutional: He appears well-developed and well-nourished. HENT:   Head: Normocephalic and atraumatic. Eyes: Conjunctivae and EOM are normal. Musculoskeletal:      Comments: Lumbar range of motion: Pain with lumbar extension.  No pain with lumbar f

## 2020-01-21 NOTE — TELEPHONE ENCOUNTER
Left sacroiliac joint injection under fluuoroscopy cpt code 9944 [id number]. [facility] Online -Per Notification/Prior Authorization Procedure Code Inquiry, no authorization is required. Reference number was not provided. Will inform Nursing.

## 2020-01-22 NOTE — TELEPHONE ENCOUNTER
Patient has been scheduled for a Left sacroiliac joint injection under fluuoroscopy, local  on 2/05/2020 at the [facility]. Medications and allergies reviewed.  Patient informed to hold aspirins, nsaids, blood thinners, multivitamins, vitamin E and fish oils 3-7

## 2020-01-30 DIAGNOSIS — M16.12 PRIMARY OSTEOARTHRITIS OF LEFT HIP: ICD-10-CM

## 2020-01-30 RX ORDER — DICLOFENAC SODIUM 75 MG/1
TABLET, DELAYED RELEASE ORAL
Qty: 45 TABLET | Refills: 0 | Status: SHIPPED | OUTPATIENT
Start: 2020-01-30 | End: 2020-06-03

## 2020-01-30 NOTE — TELEPHONE ENCOUNTER
Medication request: Diclofenac Sodium 75 mg Oral Tab. Take 1 tablet by mouth 2 times daily as needed for pain. #45. No refill.     LOV: 1/20/2020  NOV: 2/5/2020 (injection)    [place]: 1[place]6/2020

## 2020-02-05 ENCOUNTER — OFFICE VISIT (OUTPATIENT)
Dept: SURGERY | Facility: CLINIC | Age: 65
End: 2020-02-05
Payer: COMMERCIAL

## 2020-02-05 DIAGNOSIS — M53.3 PAIN OF LEFT SACROILIAC JOINT: Primary | ICD-10-CM

## 2020-02-05 PROCEDURE — 27096 INJECT SACROILIAC JOINT: CPT | Performed by: PHYSICAL MEDICINE & REHABILITATION

## 2020-02-05 NOTE — PROCEDURES
[address] JOINT INJECTION  NAME:  Stanley Omalley    MR #:    [id number] :  1955     PHYSICIAN:  Nitish Burgos        Operative Report    DATE OF PROCEDURE: 2020   PREOPERATIVE DIAGNOSES: 1.

## 2020-02-22 DIAGNOSIS — M16.12 PRIMARY OSTEOARTHRITIS OF LEFT HIP: ICD-10-CM

## 2020-02-23 RX ORDER — TIZANIDINE 2 MG/1
TABLET ORAL
Qty: 90 TABLET | Refills: 1 | Status: SHIPPED | OUTPATIENT
Start: 2020-02-23 | End: 2020-02-27

## 2020-02-23 NOTE — TELEPHONE ENCOUNTER
Review pended refill request as it does not fall under a protocol.     Last Rx: 11/30/19  LOV: 2 months ago

## 2020-02-24 NOTE — TELEPHONE ENCOUNTER
Spoke to patient state the pain is about the same. At times when doing exercises have little change but most of the day No changes in the pain. State he does not think medication is helping. Wondering if there is other pain medication that he can try.  Rece

## 2020-02-24 NOTE — TELEPHONE ENCOUNTER
Current Outpatient Medications:   •   •  gabapentin 100 MG Oral Cap, Take 1 capsule (100 mg total) by mouth 3 (three) times daily. , Disp: 90 capsule, Rfl: 1

## 2020-02-25 RX ORDER — GABAPENTIN 100 MG/1
100 CAPSULE ORAL 3 TIMES DAILY
Qty: 270 CAPSULE | Refills: 3 | Status: SHIPPED | OUTPATIENT
Start: 2020-02-25 | End: 2020-12-22

## 2020-02-25 RX ORDER — DICLOFENAC SODIUM 75 MG/1
TABLET, DELAYED RELEASE ORAL
Qty: 45 TABLET | Refills: 0 | OUTPATIENT
Start: 2020-02-25

## 2020-02-25 RX ORDER — MELOXICAM 15 MG/1
15 TABLET ORAL DAILY PRN
Qty: 30 TABLET | Refills: 0 | Status: SHIPPED | OUTPATIENT
Start: 2020-02-25 | End: 2020-03-23

## 2020-02-25 NOTE — TELEPHONE ENCOUNTER
Please confirm if ok to send 90 day supply.     Refill Protocol Appointment Criteria  · Appointment scheduled in the past 6 months or in the next 3 months  Recent Outpatient Visits            2 weeks ago Pain of left sacroiliac joint    [facility]

## 2020-02-27 ENCOUNTER — TELEPHONE (OUTPATIENT)
Dept: INTERNAL MEDICINE CLINIC | Facility: CLINIC | Age: 65
End: 2020-02-27

## 2020-02-27 ENCOUNTER — OFFICE VISIT (OUTPATIENT)
Dept: SURGERY | Facility: CLINIC | Age: 65
End: 2020-02-27
Payer: COMMERCIAL

## 2020-02-27 VITALS
HEART RATE: 70 BPM | HEIGHT: 76 IN | SYSTOLIC BLOOD PRESSURE: 161 MMHG | DIASTOLIC BLOOD PRESSURE: 90 MMHG | BODY MASS INDEX: 31.66 KG/M2 | WEIGHT: 260 LBS

## 2020-02-27 DIAGNOSIS — C61 PROSTATE CANCER (HCC): Primary | ICD-10-CM

## 2020-02-27 PROCEDURE — G0463 HOSPITAL OUTPT CLINIC VISIT: HCPCS | Performed by: UROLOGY

## 2020-02-27 PROCEDURE — 99213 OFFICE O/P EST LOW 20 MIN: CPT | Performed by: UROLOGY

## 2020-02-27 PROCEDURE — 96402 CHEMO HORMON ANTINEOPL SQ/IM: CPT | Performed by: UROLOGY

## 2020-02-27 RX ORDER — LISINOPRIL 40 MG/1
40 TABLET ORAL
Qty: 90 TABLET | Refills: 1 | Status: SHIPPED | OUTPATIENT
Start: 2020-02-27 | End: 2020-04-09

## 2020-02-27 RX ORDER — PANTOPRAZOLE SODIUM 40 MG/1
40 TABLET, DELAYED RELEASE ORAL
Qty: 90 TABLET | Refills: 1 | Status: SHIPPED | OUTPATIENT
Start: 2020-02-27 | End: 2020-03-29

## 2020-02-27 RX ORDER — TIZANIDINE 2 MG/1
TABLET ORAL
Qty: 90 TABLET | Refills: 0 | Status: SHIPPED | OUTPATIENT
Start: 2020-02-27 | End: 2020-06-12

## 2020-02-27 NOTE — TELEPHONE ENCOUNTER
90 day supply - requested.         Current Outpatient Medications:     •  TIZANIDINE HCL 2 MG Oral Tab, TAKE 1 TABLET BY MOUTH EVERYDAY AT BEDTIME, Disp: 90 tablet, Rfl: 1    •  LISINOPRIL 40 MG Oral Tab, TAKE 1 TABLET BY MOUTH EVERY DAY, Disp: 90 tablet, R

## 2020-02-27 NOTE — PROGRESS NOTES
Rena Diaz is a 71year old male.     HPI:   Patient presents with:  Prostate Cancer: patient presents for f/u on prostate ca here today for eligard inj       28-year-old male with high risk prostate cancer clinical T1c Phil 4+5 PSA 23 diagnosed visit):  Current Outpatient Medications   Medication Sig Dispense Refill   • gabapentin 100 MG Oral Cap Take 1 capsule (100 mg total) by mouth 3 (three) times daily.  270 capsule 3   • Meloxicam 15 MG Oral Tab Take 1 tablet (15 mg total) by mouth daily as n

## 2020-02-27 NOTE — TELEPHONE ENCOUNTER
Chart reviewed, tizanidine 2/23/20 was sent to [facility], [facility] contacted, patient picked up tizanidine today    Contacted patient, he would like to use [facility], tizanidine prescription adjusted, refill removed

## 2020-03-02 ENCOUNTER — PATIENT MESSAGE (OUTPATIENT)
Dept: SURGERY | Facility: CLINIC | Age: 65
End: 2020-03-02

## 2020-03-03 ENCOUNTER — TELEPHONE (OUTPATIENT)
Dept: NEUROLOGY | Facility: CLINIC | Age: 65
End: 2020-03-03

## 2020-03-03 NOTE — TELEPHONE ENCOUNTER
From: Lobito Howard  To: Baltazar Westfall MD  Sent: 3/2/2020 6:35 PM CST  Subject: Non-Urgent Medical Question    Hi Dr Fermin Price  Wanted to know if I should be concerned about a blister that formed near the injection site. Picture included.

## 2020-03-05 ENCOUNTER — PATIENT MESSAGE (OUTPATIENT)
Dept: NEUROLOGY | Facility: CLINIC | Age: 65
End: 2020-03-05

## 2020-03-05 NOTE — TELEPHONE ENCOUNTER
From: Dane Comings  To:  Jem Jimenez DO  Sent: 3/5/2020 3:04 PM CST  Subject: Non-Urgent Medical Question    hi Dr. Thomas Menard,    I need the work order for my physical therapy if you could just add it to letters in [facility]    thanks,

## 2020-03-05 NOTE — TELEPHONE ENCOUNTER
Phoned pt and spoke with him. He states the area dried up, is smaller, but a little sore. Informed pt could be possible irritation from the medication, as needle was withdrawn.  Advised pt to monitor, and ana outer perameter with ink, and if irritation inc

## 2020-03-10 ENCOUNTER — MED REC SCAN ONLY (OUTPATIENT)
Dept: NEUROLOGY | Facility: CLINIC | Age: 65
End: 2020-03-10

## 2020-03-13 NOTE — TELEPHONE ENCOUNTER
Patient is expecting to have paperwork completed -- paperwork  was to have been sent on 3/3/2020. Please contact the patient and advise the update.

## 2020-03-16 ENCOUNTER — LAB ENCOUNTER (OUTPATIENT)
Dept: LAB | Facility: HOSPITAL | Age: 65
End: 2020-03-16
Attending: INTERNAL MEDICINE
Payer: MEDICARE

## 2020-03-16 DIAGNOSIS — E55.9 VITAMIN D DEFICIENCY: ICD-10-CM

## 2020-03-16 DIAGNOSIS — Z11.59 NEED FOR HEPATITIS C SCREENING TEST: ICD-10-CM

## 2020-03-16 DIAGNOSIS — I10 ESSENTIAL HYPERTENSION: ICD-10-CM

## 2020-03-16 LAB
ALBUMIN SERPL-MCNC: 3.9 G/DL (ref 3.4–5)
ALBUMIN/GLOB SERPL: 1.1 {RATIO} (ref 1–2)
ALP LIVER SERPL-CCNC: 78 U/L (ref 45–117)
ALT SERPL-CCNC: 51 U/L (ref 16–61)
ANION GAP SERPL CALC-SCNC: 4 MMOL/L (ref 0–18)
AST SERPL-CCNC: 20 U/L (ref 15–37)
BASOPHILS # BLD AUTO: 0.06 X10(3) UL (ref 0–0.2)
BASOPHILS NFR BLD AUTO: 0.5 %
BILIRUB SERPL-MCNC: 0.4 MG/DL (ref 0.1–2)
BUN BLD-MCNC: 15 MG/DL (ref 7–18)
BUN/CREAT SERPL: 14.3 (ref 10–20)
CALCIUM BLD-MCNC: 9.3 MG/DL (ref 8.5–10.1)
CHLORIDE SERPL-SCNC: 106 MMOL/L (ref 98–112)
CHOLEST SMN-MCNC: 212 MG/DL (ref ?–200)
CO2 SERPL-SCNC: 28 MMOL/L (ref 21–32)
CREAT BLD-MCNC: 1.05 MG/DL (ref 0.7–1.3)
DEPRECATED RDW RBC AUTO: 46.9 FL (ref 35.1–46.3)
EOSINOPHIL # BLD AUTO: 0.29 X10(3) UL (ref 0–0.7)
EOSINOPHIL NFR BLD AUTO: 2.6 %
ERYTHROCYTE [DISTWIDTH] IN BLOOD BY AUTOMATED COUNT: 13.6 % (ref 11–15)
GLOBULIN PLAS-MCNC: 3.7 G/DL (ref 2.8–4.4)
GLUCOSE BLD-MCNC: 162 MG/DL (ref 70–99)
HCT VFR BLD AUTO: 45 % (ref 39–53)
HCV AB SERPL QL IA: NONREACTIVE
HDLC SERPL-MCNC: 35 MG/DL (ref 40–59)
HGB BLD-MCNC: 14.8 G/DL (ref 13–17.5)
IMM GRANULOCYTES # BLD AUTO: 0.05 X10(3) UL (ref 0–1)
IMM GRANULOCYTES NFR BLD: 0.5 %
LDLC SERPL DIRECT ASSAY-MCNC: 111 MG/DL (ref ?–100)
LYMPHOCYTES # BLD AUTO: 1.57 X10(3) UL (ref 1–4)
LYMPHOCYTES NFR BLD AUTO: 14.3 %
M PROTEIN MFR SERPL ELPH: 7.6 G/DL (ref 6.4–8.2)
MCH RBC QN AUTO: 30.8 PG (ref 26–34)
MCHC RBC AUTO-ENTMCNC: 32.9 G/DL (ref 31–37)
MCV RBC AUTO: 93.8 FL (ref 80–100)
MONOCYTES # BLD AUTO: 0.71 X10(3) UL (ref 0.1–1)
MONOCYTES NFR BLD AUTO: 6.4 %
NEUTROPHILS # BLD AUTO: 8.33 X10 (3) UL (ref 1.5–7.7)
NEUTROPHILS # BLD AUTO: 8.33 X10(3) UL (ref 1.5–7.7)
NEUTROPHILS NFR BLD AUTO: 75.7 %
NONHDLC SERPL-MCNC: 177 MG/DL (ref ?–130)
OSMOLALITY SERPL CALC.SUM OF ELEC: 290 MOSM/KG (ref 275–295)
PATIENT FASTING Y/N/NP: NO
PATIENT FASTING Y/N/NP: NO
PLATELET # BLD AUTO: 226 10(3)UL (ref 150–450)
POTASSIUM SERPL-SCNC: 3.6 MMOL/L (ref 3.5–5.1)
RBC # BLD AUTO: 4.8 X10(6)UL (ref 3.8–5.8)
SODIUM SERPL-SCNC: 138 MMOL/L (ref 136–145)
TRIGL SERPL-MCNC: 409 MG/DL (ref 30–149)
TSI SER-ACNC: 2.21 MIU/ML (ref 0.36–3.74)
VIT B12 SERPL-MCNC: 582 PG/ML (ref 193–986)
WBC # BLD AUTO: 11 X10(3) UL (ref 4–11)

## 2020-03-16 PROCEDURE — 84443 ASSAY THYROID STIM HORMONE: CPT

## 2020-03-16 PROCEDURE — 83721 ASSAY OF BLOOD LIPOPROTEIN: CPT

## 2020-03-16 PROCEDURE — 36415 COLL VENOUS BLD VENIPUNCTURE: CPT

## 2020-03-16 PROCEDURE — 85025 COMPLETE CBC W/AUTO DIFF WBC: CPT

## 2020-03-16 PROCEDURE — 80061 LIPID PANEL: CPT

## 2020-03-16 PROCEDURE — 82306 VITAMIN D 25 HYDROXY: CPT

## 2020-03-16 PROCEDURE — 82607 VITAMIN B-12: CPT

## 2020-03-16 PROCEDURE — 86803 HEPATITIS C AB TEST: CPT

## 2020-03-16 PROCEDURE — 80053 COMPREHEN METABOLIC PANEL: CPT

## 2020-03-17 ENCOUNTER — OFFICE VISIT (OUTPATIENT)
Dept: INTERNAL MEDICINE CLINIC | Facility: CLINIC | Age: 65
End: 2020-03-17
Payer: COMMERCIAL

## 2020-03-17 VITALS
DIASTOLIC BLOOD PRESSURE: 80 MMHG | TEMPERATURE: 98 F | SYSTOLIC BLOOD PRESSURE: 136 MMHG | HEART RATE: 76 BPM | BODY MASS INDEX: 32.27 KG/M2 | WEIGHT: 265 LBS | HEIGHT: 76 IN

## 2020-03-17 DIAGNOSIS — R91.1 PULMONARY NODULE: ICD-10-CM

## 2020-03-17 DIAGNOSIS — R73.9 ELEVATED BLOOD SUGAR: ICD-10-CM

## 2020-03-17 DIAGNOSIS — E78.1 PURE HYPERTRIGLYCERIDEMIA: ICD-10-CM

## 2020-03-17 DIAGNOSIS — I10 ESSENTIAL HYPERTENSION: ICD-10-CM

## 2020-03-17 DIAGNOSIS — G47.33 OBSTRUCTIVE SLEEP APNEA SYNDROME: Primary | ICD-10-CM

## 2020-03-17 DIAGNOSIS — Z72.0 TOBACCO ABUSE: ICD-10-CM

## 2020-03-17 PROBLEM — D69.6 THROMBOCYTOPENIA (HCC): Chronic | Status: ACTIVE | Noted: 2020-03-17

## 2020-03-17 PROBLEM — D69.6 THROMBOCYTOPENIA: Chronic | Status: ACTIVE | Noted: 2020-03-17

## 2020-03-17 PROCEDURE — G0463 HOSPITAL OUTPT CLINIC VISIT: HCPCS | Performed by: INTERNAL MEDICINE

## 2020-03-17 PROCEDURE — 99214 OFFICE O/P EST MOD 30 MIN: CPT | Performed by: INTERNAL MEDICINE

## 2020-03-17 NOTE — ASSESSMENT & PLAN NOTE
Elevated blood sugars most likely secondary to nonfasting state. However family history of diabetes so we will check a hemoglobin A1c with the next blood draw.

## 2020-03-17 NOTE — PATIENT INSTRUCTIONS
Problem List Items Addressed This Visit        Unprioritized    Elevated blood sugar     Elevated blood sugars most likely secondary to nonfasting state. However family history of diabetes so we will check a hemoglobin A1c with the next blood draw.

## 2020-03-17 NOTE — ASSESSMENT & PLAN NOTE
Significant elevation in the triglycerides to 408. This is to be maintained below 150. This has never been seen in the past and on discussion patient was not fasting for this blood test.  Recheck labs and follow-up as directed.   Will consider treatment i

## 2020-03-17 NOTE — PROGRESS NOTES
HPI:    Patient ID: Francisco Lucas is a 71year old male. Labs discussed  Elevated triglycerides and sugars-pt nonfasting    Hypertension   This is a chronic problem. The current episode started more than 1 year ago.  The problem has been gradually i Package 0   • PANTOPRAZOLE SODIUM 40 MG Oral Tab EC TAKE 1 TABLET BY MOUTH EVERY DAY IN THE MORNING BEFORE BREAKFAST 90 tablet 1   • MELOXICAM 15 MG Oral Tab TAKE 1 TABLET BY MOUTH EVERY DAY AS NEEDED FOR PAIN 30 tablet 0     Allergies:No Known Allergies the Chantix as yet. Encouraged to start on Chantix and gradually taper down and cut back on smoking. He is due for repeat CT scan of the chest in May–orders provided.          Sleep apnea - Primary     History of obstructive sleep apnea and has been on th

## 2020-03-17 NOTE — ASSESSMENT & PLAN NOTE
Patient continues to smoke but has reduced to about half a pack per day. He has not started in the Chantix as yet. Encouraged to start on Chantix and gradually taper down and cut back on smoking.   He is due for repeat CT scan of the chest in May–orders p

## 2020-03-17 NOTE — ASSESSMENT & PLAN NOTE
Blood pressure 136/80, pulse 76, temperature 97.9 °F (36.6 °C), temperature source Oral, height 6' 4\" (1.93 m), weight 265 lb (120.2 kg). Stable blood pressure upon recheck. Continue on lisinopril 40 mg daily.   Labs otherwise stable, minimal proteinuria

## 2020-03-17 NOTE — ASSESSMENT & PLAN NOTE
History of obstructive sleep apnea and has been on the CPAP. Tolerates it well and is very compliant with use. We will continue to monitor.

## 2020-03-18 LAB — 25(OH)D3 SERPL-MCNC: 20.6 NG/ML (ref 30–100)

## 2020-03-21 DIAGNOSIS — M16.12 PRIMARY OSTEOARTHRITIS OF LEFT HIP: ICD-10-CM

## 2020-03-23 RX ORDER — MELOXICAM 15 MG/1
TABLET ORAL
Qty: 30 TABLET | Refills: 0 | Status: SHIPPED | OUTPATIENT
Start: 2020-03-23 | End: 2020-07-01

## 2020-03-25 DIAGNOSIS — M16.12 PRIMARY OSTEOARTHRITIS OF LEFT HIP: ICD-10-CM

## 2020-03-25 RX ORDER — MELOXICAM 15 MG/1
TABLET ORAL
Qty: 30 TABLET | Refills: 0 | OUTPATIENT
Start: 2020-03-25

## 2020-03-29 RX ORDER — PANTOPRAZOLE SODIUM 40 MG/1
TABLET, DELAYED RELEASE ORAL
Qty: 90 TABLET | Refills: 1 | Status: SHIPPED | OUTPATIENT
Start: 2020-03-29 | End: 2020-06-12

## 2020-04-07 ENCOUNTER — APPOINTMENT (OUTPATIENT)
Dept: PHYSICAL THERAPY | Facility: HOSPITAL | Age: 65
End: 2020-04-07
Attending: PHYSICAL MEDICINE & REHABILITATION
Payer: MEDICARE

## 2020-04-09 ENCOUNTER — APPOINTMENT (OUTPATIENT)
Dept: PHYSICAL THERAPY | Facility: HOSPITAL | Age: 65
End: 2020-04-09
Attending: PHYSICAL MEDICINE & REHABILITATION
Payer: MEDICARE

## 2020-04-09 RX ORDER — LISINOPRIL 40 MG/1
TABLET ORAL
Qty: 90 TABLET | Refills: 1 | Status: SHIPPED | OUTPATIENT
Start: 2020-04-09 | End: 2020-06-12

## 2020-04-14 ENCOUNTER — APPOINTMENT (OUTPATIENT)
Dept: PHYSICAL THERAPY | Facility: HOSPITAL | Age: 65
End: 2020-04-14
Attending: PHYSICAL MEDICINE & REHABILITATION
Payer: MEDICARE

## 2020-04-16 ENCOUNTER — APPOINTMENT (OUTPATIENT)
Dept: PHYSICAL THERAPY | Facility: HOSPITAL | Age: 65
End: 2020-04-16
Attending: PHYSICAL MEDICINE & REHABILITATION
Payer: MEDICARE

## 2020-04-21 ENCOUNTER — APPOINTMENT (OUTPATIENT)
Dept: PHYSICAL THERAPY | Facility: HOSPITAL | Age: 65
End: 2020-04-21
Attending: PHYSICAL MEDICINE & REHABILITATION
Payer: MEDICARE

## 2020-04-23 ENCOUNTER — APPOINTMENT (OUTPATIENT)
Dept: PHYSICAL THERAPY | Facility: HOSPITAL | Age: 65
End: 2020-04-23
Attending: PHYSICAL MEDICINE & REHABILITATION
Payer: MEDICARE

## 2020-04-28 ENCOUNTER — TELEPHONE (OUTPATIENT)
Dept: NEUROLOGY | Facility: CLINIC | Age: 65
End: 2020-04-28

## 2020-04-28 ENCOUNTER — APPOINTMENT (OUTPATIENT)
Dept: PHYSICAL THERAPY | Facility: HOSPITAL | Age: 65
End: 2020-04-28
Attending: PHYSICAL MEDICINE & REHABILITATION
Payer: MEDICARE

## 2020-04-30 ENCOUNTER — APPOINTMENT (OUTPATIENT)
Dept: PHYSICAL THERAPY | Facility: HOSPITAL | Age: 65
End: 2020-04-30
Attending: PHYSICAL MEDICINE & REHABILITATION
Payer: MEDICARE

## 2020-04-30 ENCOUNTER — TELEPHONE (OUTPATIENT)
Dept: NEUROLOGY | Facility: CLINIC | Age: 65
End: 2020-04-30

## 2020-04-30 ENCOUNTER — TELEPHONE (OUTPATIENT)
Dept: PHYSICAL THERAPY | Facility: HOSPITAL | Age: 65
End: 2020-04-30

## 2020-04-30 NOTE — TELEPHONE ENCOUNTER
LOV 2/5/20 for left SI joint injection. Cancelled follow up on 3/19/20 due to Covid 19 pandemic. Rescheduled NOV now for 5/5/20 via video visit. Patient reports form faxed to [facility] on 3/5/20 was not received. Request repeat fax.    Fax sent

## 2020-04-30 NOTE — TELEPHONE ENCOUNTER
LM for pt to discuss appts in May. Looks like pt canceled appts in April and has not had evaluation, however first appt is with PTA. Asked pt to call back to discuss.

## 2020-05-01 ENCOUNTER — TELEPHONE (OUTPATIENT)
Dept: PHYSICAL THERAPY | Facility: HOSPITAL | Age: 65
End: 2020-05-01

## 2020-05-01 NOTE — TELEPHONE ENCOUNTER
Pt called regarding scheduled PT visit next week. Call back requested regarding if he wishes to attend therapy next week.  Call back number to the clinic given

## 2020-05-05 ENCOUNTER — APPOINTMENT (OUTPATIENT)
Dept: PHYSICAL THERAPY | Facility: HOSPITAL | Age: 65
End: 2020-05-05
Attending: PHYSICAL MEDICINE & REHABILITATION
Payer: MEDICARE

## 2020-05-07 ENCOUNTER — APPOINTMENT (OUTPATIENT)
Dept: PHYSICAL THERAPY | Facility: HOSPITAL | Age: 65
End: 2020-05-07
Attending: PHYSICAL MEDICINE & REHABILITATION
Payer: MEDICARE

## 2020-05-07 RX ORDER — TAMSULOSIN HYDROCHLORIDE 0.4 MG/1
CAPSULE ORAL
Qty: 180 CAPSULE | Refills: 3 | Status: SHIPPED | OUTPATIENT
Start: 2020-05-07 | End: 2020-07-15

## 2020-05-12 ENCOUNTER — APPOINTMENT (OUTPATIENT)
Dept: PHYSICAL THERAPY | Facility: HOSPITAL | Age: 65
End: 2020-05-12
Attending: PHYSICAL MEDICINE & REHABILITATION
Payer: MEDICARE

## 2020-05-14 ENCOUNTER — APPOINTMENT (OUTPATIENT)
Dept: PHYSICAL THERAPY | Facility: HOSPITAL | Age: 65
End: 2020-05-14
Attending: PHYSICAL MEDICINE & REHABILITATION
Payer: MEDICARE

## 2020-05-26 ENCOUNTER — HOSPITAL ENCOUNTER (OUTPATIENT)
Dept: CT IMAGING | Facility: HOSPITAL | Age: 65
Discharge: HOME | End: 2020-05-26
Attending: INTERNAL MEDICINE
Payer: MEDICARE

## 2020-05-26 ENCOUNTER — LAB ENCOUNTER (OUTPATIENT)
Dept: LAB | Facility: HOSPITAL | Age: 65
End: 2020-05-26
Attending: INTERNAL MEDICINE
Payer: MEDICARE

## 2020-05-26 DIAGNOSIS — R91.1 PULMONARY NODULE: ICD-10-CM

## 2020-05-26 DIAGNOSIS — R73.9 ELEVATED BLOOD SUGAR: ICD-10-CM

## 2020-05-26 DIAGNOSIS — E78.1 PURE HYPERTRIGLYCERIDEMIA: ICD-10-CM

## 2020-05-26 DIAGNOSIS — I10 ESSENTIAL HYPERTENSION: ICD-10-CM

## 2020-05-26 PROCEDURE — 83721 ASSAY OF BLOOD LIPOPROTEIN: CPT

## 2020-05-26 PROCEDURE — 80053 COMPREHEN METABOLIC PANEL: CPT

## 2020-05-26 PROCEDURE — 80061 LIPID PANEL: CPT

## 2020-05-26 PROCEDURE — 82565 ASSAY OF CREATININE: CPT

## 2020-05-26 PROCEDURE — 36415 COLL VENOUS BLD VENIPUNCTURE: CPT

## 2020-05-26 PROCEDURE — 83036 HEMOGLOBIN GLYCOSYLATED A1C: CPT

## 2020-05-26 PROCEDURE — 71260 CT THORAX DX C+: CPT | Performed by: INTERNAL MEDICINE

## 2020-06-03 ENCOUNTER — TELEPHONE (OUTPATIENT)
Dept: PHYSICAL MEDICINE AND REHAB | Facility: CLINIC | Age: 65
End: 2020-06-03

## 2020-06-03 DIAGNOSIS — M16.12 PRIMARY OSTEOARTHRITIS OF LEFT HIP: ICD-10-CM

## 2020-06-03 RX ORDER — DICLOFENAC SODIUM 75 MG/1
75 TABLET, DELAYED RELEASE ORAL 2 TIMES DAILY PRN
Qty: 45 TABLET | Refills: 0 | Status: SHIPPED | OUTPATIENT
Start: 2020-06-03 | End: 2020-07-05

## 2020-06-03 NOTE — TELEPHONE ENCOUNTER
Refill request for diclofenac 75 mg, BID PRN, #45, no refills    LOV: 1/20/20  NOV: none -- March visit cancelled due to Covid-19  Last refilled on 1/20/20

## 2020-06-09 ENCOUNTER — TELEMEDICINE (OUTPATIENT)
Dept: NEUROLOGY | Facility: CLINIC | Age: 65
End: 2020-06-09
Payer: COMMERCIAL

## 2020-06-09 ENCOUNTER — OFFICE VISIT (OUTPATIENT)
Dept: INTERNAL MEDICINE CLINIC | Facility: CLINIC | Age: 65
End: 2020-06-09
Payer: COMMERCIAL

## 2020-06-09 ENCOUNTER — TELEPHONE (OUTPATIENT)
Dept: NEUROLOGY | Facility: CLINIC | Age: 65
End: 2020-06-09

## 2020-06-09 VITALS
WEIGHT: 272 LBS | DIASTOLIC BLOOD PRESSURE: 84 MMHG | HEIGHT: 76 IN | HEART RATE: 69 BPM | BODY MASS INDEX: 33.12 KG/M2 | SYSTOLIC BLOOD PRESSURE: 136 MMHG

## 2020-06-09 DIAGNOSIS — G47.33 OSA ON CPAP: ICD-10-CM

## 2020-06-09 DIAGNOSIS — E11.65 TYPE 2 DIABETES MELLITUS WITH HYPERGLYCEMIA, WITHOUT LONG-TERM CURRENT USE OF INSULIN (HCC): Primary | ICD-10-CM

## 2020-06-09 DIAGNOSIS — K76.0 FATTY LIVER: ICD-10-CM

## 2020-06-09 DIAGNOSIS — M48.062 LUMBAR STENOSIS WITH NEUROGENIC CLAUDICATION: Primary | ICD-10-CM

## 2020-06-09 DIAGNOSIS — R91.1 PULMONARY NODULE: ICD-10-CM

## 2020-06-09 DIAGNOSIS — I10 ESSENTIAL HYPERTENSION: ICD-10-CM

## 2020-06-09 DIAGNOSIS — E78.1 PURE HYPERTRIGLYCERIDEMIA: ICD-10-CM

## 2020-06-09 DIAGNOSIS — Z99.89 OSA ON CPAP: ICD-10-CM

## 2020-06-09 PROCEDURE — 99214 OFFICE O/P EST MOD 30 MIN: CPT | Performed by: PHYSICAL MEDICINE & REHABILITATION

## 2020-06-09 PROCEDURE — G0463 HOSPITAL OUTPT CLINIC VISIT: HCPCS | Performed by: INTERNAL MEDICINE

## 2020-06-09 PROCEDURE — 99214 OFFICE O/P EST MOD 30 MIN: CPT | Performed by: INTERNAL MEDICINE

## 2020-06-09 NOTE — ASSESSMENT & PLAN NOTE
Persistent elevation in the triglycerides, last checked at 408 and currently at 457 fasting. This is most likely due to development of diabetes. Patient has gained a significant amount of weight. Diet does consist of a lot of starches.   He drinks only o

## 2020-06-09 NOTE — ASSESSMENT & PLAN NOTE
Patient is not a known diabetic. Recently diagnosed with high sugars, recheck labs fasting at this time show significantly elevated blood sugars, hemoglobin A1c is elevated.   Patient has been started on Jardiance at this time for dual benefits of improvem

## 2020-06-09 NOTE — PATIENT INSTRUCTIONS
Problem List Items Addressed This Visit        Unprioritized    Essential hypertension     Blood pressure 136/84, pulse 69, height 6' 4\" (1.93 m), weight 272 lb (123.4 kg). Stable blood pressure, well controlled at this time on lisinopril 40 mg daily.   R Patient is not a known diabetic. Recently diagnosed with high sugars, recheck labs fasting at this time show significantly elevated blood sugars, hemoglobin A1c is elevated.   Patient has been started on Jardiance at this time for dual benefits of impr

## 2020-06-09 NOTE — ASSESSMENT & PLAN NOTE
Blood pressure 136/84, pulse 69, height 6' 4\" (1.93 m), weight 272 lb (123.4 kg). Stable blood pressure, well controlled at this time on lisinopril 40 mg daily. Renal functions look stable. No chest pain, palpitations or shortness of breath.   No lower

## 2020-06-09 NOTE — ASSESSMENT & PLAN NOTE
Repeat CT scan of the chest shows persistent lung nodules but none any worse from the last study. Follow-up in 1 year.

## 2020-06-09 NOTE — ASSESSMENT & PLAN NOTE
Severe fatty liver changes noted on the CT scan of the chest.  He has had an MRI of the liver in 2018 noting some fatty changes.   Given persistent weight gain, elevation in the triglycerides will obtain an ultrasound elastography to rule out development of

## 2020-06-09 NOTE — TELEPHONE ENCOUNTER
Bilateral L4 transforaminal epidural steroid injection under fluoroscopy cpt codes [id number], [id number]. [facility] Online - Per Notification/Prior Authorization Procedure Code Inquiry, authorization is not required. Reference number was not provided.  Procedure may be

## 2020-06-09 NOTE — ASSESSMENT & PLAN NOTE
History of obstructive sleep apnea and has been using the CPAP. Has benefited from use, daytime fatigue and drowsiness has improved. He is compliant with use.   He has however gained weight and hence advised to strictly control starches, desserts in the d

## 2020-06-09 NOTE — PROGRESS NOTES
HPI:    Patient ID: Jennifer Marie is a 71year old male. Notes recorded by Narcisa Velez MD on 6/9/2020 at 11:14 AM CDT  Test for diabetes-hemoglobin A1c is much higher at 8.0.  We will discuss this at the visit.   Lipid panel shows significant shelly There are no diabetic associated symptoms. There are no hypoglycemic complications. Symptoms are stable. There are no diabetic complications. Pertinent negatives for diabetic complications include no CVA or PVD.  Risk factors for coronary artery disease inc 06/09/20  1131   BP: 136/84   Pulse: Body mass index is 33.11 kg/m². PHYSICAL EXAM:   Physical Exam   Constitutional: He is oriented to person, place, and time. He appears well-developed and well-nourished.    HENT:   Head: Normocephalic and atrauma hypertriglyceridemia     Persistent elevation in the triglycerides, last checked at 408 and currently at 457 fasting. This is most likely due to development of diabetes. Patient has gained a significant amount of weight.   Diet does consist of a lot of st 7/7/2020).     PT UNDERSTANDS AND AGREES TO FOLLOW DIRECTIONS AND ADVICE    Orders Placed This Encounter      Comp Metabolic Panel (14) [E]      Lipid Panel [E]      Meds This Visit:  Requested Prescriptions     Signed Prescriptions Disp Refills   • Sergo

## 2020-06-11 NOTE — TELEPHONE ENCOUNTER
Patient was scheduled for Bilateral L4 transforaminal epidural steroid injection under fluoroscopy, local on Wednesday, July 1, 2020. Medications and allergies reviewed.  Patient informed to hold aspirins, nsaids, blood thinners, vitamins and fish oils 7

## 2020-06-12 RX ORDER — PANTOPRAZOLE SODIUM 40 MG/1
TABLET, DELAYED RELEASE ORAL
Qty: 90 TABLET | Refills: 1 | Status: SHIPPED | OUTPATIENT
Start: 2020-06-12 | End: 2020-12-22

## 2020-06-12 RX ORDER — TIZANIDINE 2 MG/1
TABLET ORAL
Qty: 90 TABLET | Refills: 0 | Status: SHIPPED | OUTPATIENT
Start: 2020-06-12 | End: 2020-11-09

## 2020-06-12 RX ORDER — LISINOPRIL 40 MG/1
TABLET ORAL
Qty: 90 TABLET | Refills: 1 | Status: SHIPPED | OUTPATIENT
Start: 2020-06-12 | End: 2020-08-07

## 2020-06-19 ENCOUNTER — TELEPHONE (OUTPATIENT)
Dept: CASE MANAGEMENT | Age: 65
End: 2020-06-19

## 2020-06-29 ENCOUNTER — LAB REQUISITION (OUTPATIENT)
Dept: LAB | Facility: HOSPITAL | Age: 65
End: 2020-06-29
Payer: MEDICARE

## 2020-06-29 DIAGNOSIS — Z20.828 CONTACT WITH AND (SUSPECTED) EXPOSURE TO OTHER VIRAL COMMUNICABLE DISEASES: ICD-10-CM

## 2020-06-29 LAB — SARS-COV-2 RNA RESP QL NAA+PROBE: NOT DETECTED

## 2020-07-01 ENCOUNTER — OFFICE VISIT (OUTPATIENT)
Dept: SURGERY | Facility: CLINIC | Age: 65
End: 2020-07-01

## 2020-07-01 DIAGNOSIS — M48.062 LUMBAR STENOSIS WITH NEUROGENIC CLAUDICATION: Primary | ICD-10-CM

## 2020-07-01 PROCEDURE — 64483 NJX AA&/STRD TFRM EPI L/S 1: CPT | Performed by: PHYSICAL MEDICINE & REHABILITATION

## 2020-07-01 NOTE — PROCEDURES
Darius DAVIS 7.    BILATERAL LUMBAR TRANSFORAMINAL   NAME:  Liss Biswas    MR #:    [id number] :  1955     PHYSICIAN:  Cookie Mcgowan        Operative Report    DATE OF PROCEDURE: 2020   PREOPERATIVE DIAGNOSES: 1.  Shakira Rubalcava cc of 1% PF lidocaine without epinephrine. Then, a 5 inch, 22-gauge spinal needle was inserted and directed towards the left L4-5 intervertebral foramen.   When it felt to be in good position, AP fluoroscopy was used to advance the needle to the 6 o'clock

## 2020-07-05 DIAGNOSIS — M16.12 PRIMARY OSTEOARTHRITIS OF LEFT HIP: ICD-10-CM

## 2020-07-06 RX ORDER — DICLOFENAC SODIUM 75 MG/1
75 TABLET, DELAYED RELEASE ORAL 2 TIMES DAILY PRN
Qty: 45 TABLET | Refills: 0 | Status: SHIPPED | OUTPATIENT
Start: 2020-07-06 | End: 2020-09-28 | Stop reason: ALTCHOICE

## 2020-07-09 DIAGNOSIS — M16.12 PRIMARY OSTEOARTHRITIS OF LEFT HIP: ICD-10-CM

## 2020-07-09 RX ORDER — DICLOFENAC SODIUM 75 MG/1
75 TABLET, DELAYED RELEASE ORAL 2 TIMES DAILY PRN
Qty: 45 TABLET | Refills: 0 | Status: CANCELLED | OUTPATIENT
Start: 2020-07-09

## 2020-07-10 DIAGNOSIS — M16.12 PRIMARY OSTEOARTHRITIS OF LEFT HIP: ICD-10-CM

## 2020-07-10 RX ORDER — DICLOFENAC SODIUM 75 MG/1
75 TABLET, DELAYED RELEASE ORAL 2 TIMES DAILY PRN
Qty: 45 TABLET | Refills: 0 | OUTPATIENT
Start: 2020-07-10

## 2020-07-12 DIAGNOSIS — M16.12 PRIMARY OSTEOARTHRITIS OF LEFT HIP: ICD-10-CM

## 2020-07-13 ENCOUNTER — TELEPHONE (OUTPATIENT)
Dept: NEUROLOGY | Facility: CLINIC | Age: 65
End: 2020-07-13

## 2020-07-13 RX ORDER — DICLOFENAC SODIUM 75 MG/1
TABLET, DELAYED RELEASE ORAL
Qty: 45 TABLET | Refills: 0 | OUTPATIENT
Start: 2020-07-13

## 2020-07-14 NOTE — TELEPHONE ENCOUNTER
Received disability forms from [employer]. Started forms and placed on Dr. Malka Menendez desk for completion.

## 2020-07-14 NOTE — TELEPHONE ENCOUNTER
Long Term Disability Forms from [employer] and office notes faxed to [phone]  Copy sent to scanning

## 2020-07-15 NOTE — TELEPHONE ENCOUNTER
LOV 2/27/20  LR 5/7/20  F/u 6 months  Future Appointments   Date Time Provider Jenifer Tillman   7/31/2020  7:30 AM [facility] [address]   8/7/2020  9:30 AM Shelley Blackburn MD [facility]   8/24/2020  9:00 AM Jolly Strong MD [facility]

## 2020-07-15 NOTE — TELEPHONE ENCOUNTER
•  tamsulosin (FLOMAX) cap, TAKE 1 CAPSULE BY MOUTH 2 TIMES DAILY 1/2 HOUR FOLLOWING THE SAME MEAL EACH DAY, Disp: 180 capsule, Rfl: 3

## 2020-07-16 RX ORDER — TAMSULOSIN HYDROCHLORIDE 0.4 MG/1
0.4 CAPSULE ORAL
Qty: 180 CAPSULE | Refills: 1 | Status: SHIPPED | OUTPATIENT
Start: 2020-07-16 | End: 2021-06-17

## 2020-07-31 ENCOUNTER — HOSPITAL ENCOUNTER (OUTPATIENT)
Dept: ULTRASOUND IMAGING | Facility: HOSPITAL | Age: 65
Discharge: HOME | End: 2020-07-31
Attending: INTERNAL MEDICINE
Payer: MEDICARE

## 2020-07-31 DIAGNOSIS — K76.0 FATTY LIVER: ICD-10-CM

## 2020-07-31 PROCEDURE — 76981 USE PARENCHYMA: CPT | Performed by: INTERNAL MEDICINE

## 2020-07-31 PROCEDURE — 76705 ECHO EXAM OF ABDOMEN: CPT | Performed by: INTERNAL MEDICINE

## 2020-08-07 ENCOUNTER — OFFICE VISIT (OUTPATIENT)
Dept: INTERNAL MEDICINE CLINIC | Facility: CLINIC | Age: 65
End: 2020-08-07
Payer: COMMERCIAL

## 2020-08-07 VITALS
HEIGHT: 76 IN | WEIGHT: 267.69 LBS | SYSTOLIC BLOOD PRESSURE: 119 MMHG | DIASTOLIC BLOOD PRESSURE: 75 MMHG | HEART RATE: 98 BPM | BODY MASS INDEX: 32.6 KG/M2

## 2020-08-07 DIAGNOSIS — K76.0 FATTY LIVER: Primary | ICD-10-CM

## 2020-08-07 DIAGNOSIS — E11.65 TYPE 2 DIABETES MELLITUS WITH HYPERGLYCEMIA, WITHOUT LONG-TERM CURRENT USE OF INSULIN (HCC): ICD-10-CM

## 2020-08-07 DIAGNOSIS — E78.1 PURE HYPERTRIGLYCERIDEMIA: ICD-10-CM

## 2020-08-07 PROCEDURE — G0463 HOSPITAL OUTPT CLINIC VISIT: HCPCS | Performed by: INTERNAL MEDICINE

## 2020-08-07 PROCEDURE — 3008F BODY MASS INDEX DOCD: CPT | Performed by: INTERNAL MEDICINE

## 2020-08-07 PROCEDURE — 3078F DIAST BP <80 MM HG: CPT | Performed by: INTERNAL MEDICINE

## 2020-08-07 PROCEDURE — 99214 OFFICE O/P EST MOD 30 MIN: CPT | Performed by: INTERNAL MEDICINE

## 2020-08-07 PROCEDURE — 3074F SYST BP LT 130 MM HG: CPT | Performed by: INTERNAL MEDICINE

## 2020-08-07 RX ORDER — LANCETS
EACH MISCELLANEOUS DAILY
COMMUNITY
Start: 2020-06-09

## 2020-08-07 RX ORDER — BLOOD SUGAR DIAGNOSTIC
1 STRIP MISCELLANEOUS DAILY
COMMUNITY
Start: 2020-06-09

## 2020-08-07 RX ORDER — LOSARTAN POTASSIUM 100 MG/1
100 TABLET ORAL DAILY
Qty: 90 TABLET | Refills: 0 | Status: SHIPPED | OUTPATIENT
Start: 2020-08-07 | End: 2020-09-16

## 2020-08-07 NOTE — PATIENT INSTRUCTIONS
Problem List Items Addressed This Visit        Unprioritized    Fatty liver - Primary     Severe fatty liver changes noted on the CT scan of the chest with liver dysfunction on labs.   Patient underwent an ultrasound liver–elastography which does not show s 119/75, pulse 98, height 6' 4\" (1.93 m), weight 267 lb 11.2 oz (121.4 kg). She is a stable, seems well controlled. Advised to discontinue the lisinopril as he is taking a significant amount of diclofenac in addition.   He is also cautioned about the u

## 2020-08-07 NOTE — ASSESSMENT & PLAN NOTE
Wt Readings from Last 6 Encounters:  08/07/20 : 267 lb 11.2 oz (121.4 kg)  06/09/20 : 272 lb (123.4 kg)  03/17/20 : 265 lb (120.2 kg)  02/27/20 : 260 lb (117.9 kg)  01/20/20 : 260 lb (117.9 kg)  12/14/19 : 256 lb 6.4 oz (116.3 kg)     Patient was started o

## 2020-08-07 NOTE — PROGRESS NOTES
HPI:    Patient ID: Ludmila Campbell is a 71year old male. CONCLUSION:   1. Moderate hepatomegaly with hepatic steatosis. Multiple benign simple and mildly complex hepatic cysts are noted, the largest measuring 3.4 cm. No solid liver lesions. 2.  L of Systems   Constitutional: Negative. HENT: Negative. Eyes: Negative. Respiratory: Negative. Negative for shortness of breath. Cardiovascular: Negative. Negative for palpitations and orthopnea. Gastrointestinal: Negative.     Genitourinary: Eyes: Pupils are equal, round, and reactive to light. Conjunctivae and EOM are normal.   Neck: Normal range of motion. Neck supple. No JVD present. No thyromegaly present.    Cardiovascular: Normal rate, regular rhythm, normal heart sounds and intact dist encouraged to have this completed this weekend. He is also advised to return in the next few weeks with fasting labs completed prior to his next visit. Liver function tests are pending. Lipid panels are pending.   Blood pressure 119/75, pulse 98, height

## 2020-08-07 NOTE — ASSESSMENT & PLAN NOTE
Severe fatty liver changes noted on the CT scan of the chest with liver dysfunction on labs. Patient underwent an ultrasound liver–elastography which does not show significant fibrosis. Mild fibrosis noted at this time.   Multiple complex cysts are presen

## 2020-08-07 NOTE — ASSESSMENT & PLAN NOTE
Lipid panel showed elevation in the triglycerides–quite significant at 457. Blood sugars are elevated and he has been started on diabetes management. His blood sugars have improved. Recheck labs are pending. He has lost some weight at this time.   He

## 2020-08-18 NOTE — TELEPHONE ENCOUNTER
Patient called stating notes and forms were never recvd and need to be faxed again immediately or patient will be cut off

## 2020-08-24 ENCOUNTER — OFFICE VISIT (OUTPATIENT)
Dept: SURGERY | Facility: CLINIC | Age: 65
End: 2020-08-24
Payer: COMMERCIAL

## 2020-08-24 ENCOUNTER — LAB ENCOUNTER (OUTPATIENT)
Dept: LAB | Facility: HOSPITAL | Age: 65
End: 2020-08-24
Attending: UROLOGY
Payer: MEDICARE

## 2020-08-24 VITALS
BODY MASS INDEX: 32 KG/M2 | DIASTOLIC BLOOD PRESSURE: 75 MMHG | HEART RATE: 75 BPM | SYSTOLIC BLOOD PRESSURE: 116 MMHG | WEIGHT: 260 LBS

## 2020-08-24 DIAGNOSIS — C61 PROSTATE CANCER (HCC): Primary | ICD-10-CM

## 2020-08-24 DIAGNOSIS — E11.65 TYPE 2 DIABETES MELLITUS WITH HYPERGLYCEMIA, WITHOUT LONG-TERM CURRENT USE OF INSULIN (HCC): ICD-10-CM

## 2020-08-24 DIAGNOSIS — C61 PROSTATE CANCER (HCC): ICD-10-CM

## 2020-08-24 DIAGNOSIS — E78.1 PURE HYPERTRIGLYCERIDEMIA: ICD-10-CM

## 2020-08-24 DIAGNOSIS — K76.0 FATTY LIVER: ICD-10-CM

## 2020-08-24 LAB
ALBUMIN SERPL-MCNC: 3.7 G/DL (ref 3.4–5)
ALBUMIN/GLOB SERPL: 0.9 {RATIO} (ref 1–2)
ALP LIVER SERPL-CCNC: 82 U/L (ref 45–117)
ALT SERPL-CCNC: 50 U/L (ref 16–61)
ANION GAP SERPL CALC-SCNC: 6 MMOL/L (ref 0–18)
AST SERPL-CCNC: 32 U/L (ref 15–37)
BASOPHILS # BLD AUTO: 0.08 X10(3) UL (ref 0–0.2)
BASOPHILS NFR BLD AUTO: 0.8 %
BILIRUB SERPL-MCNC: 0.5 MG/DL (ref 0.1–2)
BILIRUB UR QL: NEGATIVE
BUN BLD-MCNC: 11 MG/DL (ref 7–18)
BUN/CREAT SERPL: 11.8 (ref 10–20)
CALCIUM BLD-MCNC: 9.6 MG/DL (ref 8.5–10.1)
CHLORIDE SERPL-SCNC: 108 MMOL/L (ref 98–112)
CHOLEST SMN-MCNC: 216 MG/DL (ref ?–200)
CLARITY UR: CLEAR
CO2 SERPL-SCNC: 28 MMOL/L (ref 21–32)
COLOR UR: YELLOW
CREAT BLD-MCNC: 0.93 MG/DL (ref 0.7–1.3)
CREAT UR-SCNC: 97.4 MG/DL
DEPRECATED RDW RBC AUTO: 46.9 FL (ref 35.1–46.3)
EOSINOPHIL # BLD AUTO: 0.44 X10(3) UL (ref 0–0.7)
EOSINOPHIL NFR BLD AUTO: 4.6 %
ERYTHROCYTE [DISTWIDTH] IN BLOOD BY AUTOMATED COUNT: 13.8 % (ref 11–15)
EST. AVERAGE GLUCOSE BLD GHB EST-MCNC: 151 MG/DL (ref 68–126)
GLOBULIN PLAS-MCNC: 4 G/DL (ref 2.8–4.4)
GLUCOSE BLD-MCNC: 131 MG/DL (ref 70–99)
GLUCOSE UR-MCNC: >=500 MG/DL
HBA1C MFR BLD HPLC: 6.9 % (ref ?–5.7)
HCT VFR BLD AUTO: 47.2 % (ref 39–53)
HDLC SERPL-MCNC: 33 MG/DL (ref 40–59)
HGB BLD-MCNC: 15.7 G/DL (ref 13–17.5)
HGB UR QL STRIP.AUTO: NEGATIVE
IMM GRANULOCYTES # BLD AUTO: 0.05 X10(3) UL (ref 0–1)
IMM GRANULOCYTES NFR BLD: 0.5 %
KETONES UR-MCNC: NEGATIVE MG/DL
LDLC SERPL DIRECT ASSAY-MCNC: 109 MG/DL (ref ?–100)
LEUKOCYTE ESTERASE UR QL STRIP.AUTO: NEGATIVE
LYMPHOCYTES # BLD AUTO: 1.52 X10(3) UL (ref 1–4)
LYMPHOCYTES NFR BLD AUTO: 15.8 %
M PROTEIN MFR SERPL ELPH: 7.7 G/DL (ref 6.4–8.2)
MCH RBC QN AUTO: 30.9 PG (ref 26–34)
MCHC RBC AUTO-ENTMCNC: 33.3 G/DL (ref 31–37)
MCV RBC AUTO: 92.9 FL (ref 80–100)
MICROALBUMIN UR-MCNC: 0.82 MG/DL
MICROALBUMIN/CREAT 24H UR-RTO: 8.4 UG/MG (ref ?–30)
MONOCYTES # BLD AUTO: 0.64 X10(3) UL (ref 0.1–1)
MONOCYTES NFR BLD AUTO: 6.6 %
NEUTROPHILS # BLD AUTO: 6.9 X10 (3) UL (ref 1.5–7.7)
NEUTROPHILS # BLD AUTO: 6.9 X10(3) UL (ref 1.5–7.7)
NEUTROPHILS NFR BLD AUTO: 71.7 %
NITRITE UR QL STRIP.AUTO: NEGATIVE
NONHDLC SERPL-MCNC: 183 MG/DL (ref ?–130)
OSMOLALITY SERPL CALC.SUM OF ELEC: 295 MOSM/KG (ref 275–295)
PATIENT FASTING Y/N/NP: YES
PATIENT FASTING Y/N/NP: YES
PH UR: 6 [PH] (ref 5–8)
PLATELET # BLD AUTO: 210 10(3)UL (ref 150–450)
POTASSIUM SERPL-SCNC: 3.9 MMOL/L (ref 3.5–5.1)
PROT UR-MCNC: NEGATIVE MG/DL
PSA SERPL-MCNC: <0.01 NG/ML (ref ?–4)
RBC # BLD AUTO: 5.08 X10(6)UL (ref 3.8–5.8)
RBC #/AREA URNS AUTO: <1 /HPF
SODIUM SERPL-SCNC: 142 MMOL/L (ref 136–145)
SP GR UR STRIP: 1.03 (ref 1–1.03)
TRIGL SERPL-MCNC: 406 MG/DL (ref 30–149)
UROBILINOGEN UR STRIP-ACNC: <2
WBC # BLD AUTO: 9.6 X10(3) UL (ref 4–11)
WBC #/AREA URNS AUTO: <1 /HPF

## 2020-08-24 PROCEDURE — 80061 LIPID PANEL: CPT

## 2020-08-24 PROCEDURE — 83721 ASSAY OF BLOOD LIPOPROTEIN: CPT

## 2020-08-24 PROCEDURE — 3078F DIAST BP <80 MM HG: CPT | Performed by: UROLOGY

## 2020-08-24 PROCEDURE — 99214 OFFICE O/P EST MOD 30 MIN: CPT | Performed by: UROLOGY

## 2020-08-24 PROCEDURE — 84153 ASSAY OF PSA TOTAL: CPT

## 2020-08-24 PROCEDURE — 82570 ASSAY OF URINE CREATININE: CPT

## 2020-08-24 PROCEDURE — 3074F SYST BP LT 130 MM HG: CPT | Performed by: UROLOGY

## 2020-08-24 PROCEDURE — 85025 COMPLETE CBC W/AUTO DIFF WBC: CPT

## 2020-08-24 PROCEDURE — 82043 UR ALBUMIN QUANTITATIVE: CPT

## 2020-08-24 PROCEDURE — 36415 COLL VENOUS BLD VENIPUNCTURE: CPT

## 2020-08-24 PROCEDURE — G0463 HOSPITAL OUTPT CLINIC VISIT: HCPCS | Performed by: UROLOGY

## 2020-08-24 PROCEDURE — 80053 COMPREHEN METABOLIC PANEL: CPT

## 2020-08-24 PROCEDURE — 83036 HEMOGLOBIN GLYCOSYLATED A1C: CPT

## 2020-08-24 PROCEDURE — 81001 URINALYSIS AUTO W/SCOPE: CPT

## 2020-08-24 NOTE — PROGRESS NOTES
Dane Piedra is a 71year old male. HPI:   Patient presents with:  Prostate Cancer: PT presents for follow up visit. PT states he forgot to complete psa test. PT denies new issues or complaints.        57-year-old -American male with high ri (Active prior to today's visit):  Current Outpatient Medications   Medication Sig Dispense Refill   • Glucose Blood (ACCU-CHEK GUIDE) In Vitro Strip 1 strip by In Vitro route daily.      • Accu-Chek FastClix Lancets Does not apply Misc by In Vitro route do

## 2020-09-08 ENCOUNTER — OFFICE VISIT (OUTPATIENT)
Dept: INTERNAL MEDICINE CLINIC | Facility: CLINIC | Age: 65
End: 2020-09-08
Payer: COMMERCIAL

## 2020-09-08 VITALS
HEART RATE: 80 BPM | HEIGHT: 76 IN | DIASTOLIC BLOOD PRESSURE: 82 MMHG | SYSTOLIC BLOOD PRESSURE: 133 MMHG | RESPIRATION RATE: 18 BRPM | BODY MASS INDEX: 32.56 KG/M2 | WEIGHT: 267.38 LBS

## 2020-09-08 DIAGNOSIS — Z82.49 FAMILY HISTORY OF BRAIN ANEURYSM: ICD-10-CM

## 2020-09-08 DIAGNOSIS — M54.16 LUMBAR RADICULOPATHY: ICD-10-CM

## 2020-09-08 DIAGNOSIS — C61 PROSTATE CANCER (HCC): ICD-10-CM

## 2020-09-08 DIAGNOSIS — D69.6 THROMBOCYTOPENIA (HCC): ICD-10-CM

## 2020-09-08 DIAGNOSIS — K76.0 FATTY LIVER: ICD-10-CM

## 2020-09-08 DIAGNOSIS — I10 ESSENTIAL HYPERTENSION: ICD-10-CM

## 2020-09-08 DIAGNOSIS — E11.65 TYPE 2 DIABETES MELLITUS WITH HYPERGLYCEMIA, WITHOUT LONG-TERM CURRENT USE OF INSULIN (HCC): ICD-10-CM

## 2020-09-08 DIAGNOSIS — E55.9 VITAMIN D DEFICIENCY: ICD-10-CM

## 2020-09-08 DIAGNOSIS — E66.2 HYPOVENTILATION ASSOCIATED WITH OBESITY SYNDROME (HCC): ICD-10-CM

## 2020-09-08 DIAGNOSIS — R91.1 PULMONARY NODULE: ICD-10-CM

## 2020-09-08 DIAGNOSIS — G44.011 INTRACTABLE EPISODIC CLUSTER HEADACHE: ICD-10-CM

## 2020-09-08 DIAGNOSIS — Z72.0 TOBACCO ABUSE: ICD-10-CM

## 2020-09-08 DIAGNOSIS — Z00.00 ENCOUNTER FOR ANNUAL HEALTH EXAMINATION: ICD-10-CM

## 2020-09-08 DIAGNOSIS — Z00.00 MEDICARE ANNUAL WELLNESS VISIT, SUBSEQUENT: Primary | ICD-10-CM

## 2020-09-08 DIAGNOSIS — D12.6 TUBULAR ADENOMA OF COLON: ICD-10-CM

## 2020-09-08 PROBLEM — J43.8 OTHER EMPHYSEMA (HCC): Status: ACTIVE | Noted: 2020-09-08

## 2020-09-08 PROBLEM — B07.0 PLANTAR WART: Status: RESOLVED | Noted: 2018-05-09 | Resolved: 2020-09-08

## 2020-09-08 PROBLEM — J01.00 ACUTE MAXILLARY SINUSITIS: Status: RESOLVED | Noted: 2019-07-09 | Resolved: 2020-09-08

## 2020-09-08 PROCEDURE — 3075F SYST BP GE 130 - 139MM HG: CPT | Performed by: INTERNAL MEDICINE

## 2020-09-08 PROCEDURE — G0438 PPPS, INITIAL VISIT: HCPCS | Performed by: INTERNAL MEDICINE

## 2020-09-08 PROCEDURE — 90732 PPSV23 VACC 2 YRS+ SUBQ/IM: CPT | Performed by: INTERNAL MEDICINE

## 2020-09-08 PROCEDURE — G0009 ADMIN PNEUMOCOCCAL VACCINE: HCPCS | Performed by: INTERNAL MEDICINE

## 2020-09-08 PROCEDURE — 3079F DIAST BP 80-89 MM HG: CPT | Performed by: INTERNAL MEDICINE

## 2020-09-08 PROCEDURE — 96160 PT-FOCUSED HLTH RISK ASSMT: CPT | Performed by: INTERNAL MEDICINE

## 2020-09-08 PROCEDURE — G0008 ADMIN INFLUENZA VIRUS VAC: HCPCS | Performed by: INTERNAL MEDICINE

## 2020-09-08 PROCEDURE — 99397 PER PM REEVAL EST PAT 65+ YR: CPT | Performed by: INTERNAL MEDICINE

## 2020-09-08 PROCEDURE — 3008F BODY MASS INDEX DOCD: CPT | Performed by: INTERNAL MEDICINE

## 2020-09-08 PROCEDURE — 90662 IIV NO PRSV INCREASED AG IM: CPT | Performed by: INTERNAL MEDICINE

## 2020-09-08 RX ORDER — ATORVASTATIN CALCIUM 10 MG/1
10 TABLET, FILM COATED ORAL DAILY
Qty: 90 TABLET | Refills: 1 | Status: SHIPPED | OUTPATIENT
Start: 2020-09-08 | End: 2020-12-22

## 2020-09-08 NOTE — ASSESSMENT & PLAN NOTE
Mild asymptomatic thrombocytopenia. Intermittent episodes. Recent labs however normal.  Continue to monitor.

## 2020-09-08 NOTE — ASSESSMENT & PLAN NOTE
Strict diet control, restricted portion sizes, avoidance of unnecessary snacks and desserts. Increase vegetables in the diet. Increase fluid in the diet. Exercise for about 30 minutes daily. Follow-up in about 3 months.

## 2020-09-08 NOTE — PATIENT INSTRUCTIONS
Problem List Items Addressed This Visit        Unprioritized    Body mass index (BMI) of 31.0-31.9 in adult     Strict diet control, restricted portion sizes, avoidance of unnecessary snacks and desserts. Increase vegetables in the diet.   Increase fluid i He has been using it on a regular basis. Blood pressures are well controlled. We will follow-up on the echocardiogram due to recent shortness of breath with exertion. Lumbar radiculopathy     Low back pain with intermittent radiculopathy.   Rad hypertrophy without any retroperitoneal or pelvic adenopathy. He is being monitored per urology on a regular basis. Pulmonary nodule     Repeat CT scan with bilateral swollen groundglass density lung nodules up to 4 to 5 mm in size.   These seem bilaterally. Referral for diabetic eye exam provided. Patient is advised to set up an appointment with Dr. Dustin Mehta    Referral for diabetic foot exam provided.     No significant diabetic neuropathy but does have some numbness and tingling in his feet fo intervals for prediabetic patients        Cardiovascular Disease Screening     Cholesterol, covered every 5 yrs including Total, LDL and Trigs LDL Cholesterol (no units)   Date Value   08/24/2020      Comment:     Unable to calculate LDL due to Triglycerid (age 48 or over), MALISSA not paid separately when covered E/M service is provided on same date    Immunizations      Influenza  Covered Annually Orders placed or performed in visit on 09/08/20   • FLU VACC HIGH DOSE PRSV FREE    Please get every year    Pneum Citizen of [place])  [email or web address]  This link also has information from the [address] [place] [address] regarding Advance Directives.

## 2020-09-08 NOTE — ASSESSMENT & PLAN NOTE
Vitamin D levels were low when checked in March. He has been on vitamin D 2000 units daily. Recheck labs with the next blood draw.

## 2020-09-08 NOTE — ASSESSMENT & PLAN NOTE
Continues to smoke, about half a pack per day. Has not started on this Chantix as yet. New prescription provided. CT scan of the chest without any new abnormalities. Repeat evaluation in about 1 year.   He he has been encouraged to start a trial of quit

## 2020-09-08 NOTE — ASSESSMENT & PLAN NOTE
History of prostate cancer diagnosed in March 2018. He has been on androgen deprivation therapy with Eligard every 3 months. This was started in April 2018.   He was then treated with external beam radiation for the prostate sequenced and then had prostat

## 2020-09-08 NOTE — ASSESSMENT & PLAN NOTE
Patient with a family history of brain aneurysm in his father at 36years of age and then again at 61. Patient himself with history of hypertension and smoking and hence has increased risks.   MRI of the brain, CT chest and abdomen did not show any aneurys

## 2020-09-08 NOTE — ASSESSMENT & PLAN NOTE
Normal exam.  Labs as ordered. Skin check–normal, no significant abnormalities noted  No cervical, axillary, inguinal lymphadenopathy. Hernial orifices intact. Rectal exam normal,prostate normal to palpation.   Small hemorrhoids present.  guaic negetive

## 2020-09-08 NOTE — ASSESSMENT & PLAN NOTE
Intractable headaches–much improved at this time. Has not had a recent episode. Continue to monitor. He has had an MRI of the brain due to family history of brain aneurysm–negative for abnormalities.

## 2020-09-08 NOTE — ASSESSMENT & PLAN NOTE
Low back pain with intermittent radiculopathy. Radiation to lower extremity and thigh at this time. He has been doing well after his TIAGO. Continue to monitor.

## 2020-09-08 NOTE — ASSESSMENT & PLAN NOTE
Severe fatty liver changes. This has been persistent. Liver functions look normal.  Recent elastography/ultrasound of the liver showed mild fibrosis. Multiple complex cysts are present in the liver.   Will need to repeat the ultrasound of the liver in ab

## 2020-09-08 NOTE — ASSESSMENT & PLAN NOTE
Blood sugars much improved on Jardiance. He has not lost much weight since that visit. His home sugars remain stable. He has not had any low sugar reactions. No urinary tract symptoms at this time.   Lipid panel shows persistent elevation in the triglyc

## 2020-09-08 NOTE — ASSESSMENT & PLAN NOTE
Longstanding history of sleep apnea on CPAP. He has been using it on a regular basis. Blood pressures are well controlled. We will follow-up on the echocardiogram due to recent shortness of breath with exertion.

## 2020-09-08 NOTE — ASSESSMENT & PLAN NOTE
Lipid panel shows persistent elevation in triglycerides–about 408. Discussed risks for pancreatitis and pancreatic inflammation as well as worsening diabetes related to evaluation. Strict restriction of alcohol has been discussed.   Will be starting on Li

## 2020-09-08 NOTE — ASSESSMENT & PLAN NOTE
Blood pressure 133/82, pulse 80, resp. rate 18, height 6' 4\" (1.93 m), weight 267 lb 6.4 oz (121.3 kg). Stable blood pressure, well controlled on losartan 100 mg daily.   No chest pain or palpitations but gradually worsening shortness of breath with ex

## 2020-09-08 NOTE — ASSESSMENT & PLAN NOTE
Mild emphysema noted on the CT scan. No significant wheezing but he has been having some shortness of breath with exertion. No chest pain or palpitations. Stress test has been stable.

## 2020-09-08 NOTE — ASSESSMENT & PLAN NOTE
Repeat CT scan with bilateral swollen groundglass density lung nodules up to 4 to 5 mm in size. These seem to be stable compared to the prior CTs dating back to 2018. Follow-up in 1 year or as discussed.

## 2020-09-08 NOTE — PROGRESS NOTES
HPI:   Buster June is a 76year old male who presents for a Medicare Subsequent Annual Wellness visit (Pt already had Initial Annual Wellness).       Annual Physical due on 12/14/2020      Immunization History   Administered Date(s) Administered had a completely normal cognitive assessment- see flowsheet entries    Functional Ability/Status   Elinor Saucedo has some abnormal functions as listed below:  He has Walking problems based on screening of functional status.    Difficulty walking?: (P) risk.     Patient Care Team: Patient Care Team:  Sheila Yang MD as PCP - General (Internal Medicine)  Milton De Anda MD (Radiation Oncology)  Tommy Shaffer MD (Radiation Oncology)  TYLER France, MARIO ALBERTO as Physical Therapist ( Oral Misc, Take as directed  Glucose Blood (ACCU-CHEK GUIDE) In Vitro Strip, 1 strip by In Vitro route daily. Accu-Chek FastClix Lancets Does not apply Misc, by In Vitro route daily. losartan 100 MG Oral Tab, Take 1 tablet (100 mg total) by mouth daily. shortness of breath (with exertion). Cardiovascular: Negative. Negative for chest pain and palpitations. Gastrointestinal: Negative. Endocrine: Negative. Genitourinary: Negative. Musculoskeletal: Negative. Skin: Negative.     Allergic/Immu misunderstand some words in a sentence and need to ask people to repeat themselves:  Sometimes   I especially have trouble understanding the speech of women and children:  Sometimes I have trouble understanding the speaker in a large room such as at a meet Musculoskeletal: Normal range of motion. Lymphadenopathy:     He has no cervical adenopathy. Neurological: He is alert and oriented to person, place, and time. He displays normal reflexes. No cranial nerve deficit, sensory deficit or motor deficit.  C kg).     Stable blood pressure, well controlled on losartan 100 mg daily. No chest pain or palpitations but gradually worsening shortness of breath with exertion noted.   We will obtain an echocardiogram of the heart and follow-up after completion  Last tr stools. Colonoscopy due on 08/13/2023  PSA due on 08/24/2022  PSA levels almost undetectable at this time. Immunizations–.   Immunization History   Administered Date(s) Administered   • FLU VAC High Dose 65 YRS & Older PRSV Free (02531) 11/07/2019, 09/08/ abnormalities. Repeat evaluation in about 1 year. He he has been encouraged to start a trial of quitting again.   He is willing to start on Chantix today         Relevant Orders    BEHAV CHNG SMOKING GR THAN 3 UP TO 10 MIN    Tubular adenoma of colon VITAMIN D, 25-HYDROXY    VITAMIN B12      Other Visit Diagnoses     Body mass index (BMI) of 33.0-33.9 in adult        Relevant Orders    BEHAVIOR  OBESITY 15M    Encounter for annual health examination             PLAN SUMMARY:   Diagnoses and all Diana Wiseman MD, 9/8/2020     General Health     In the past six months, have you lost more than 10 pounds without trying?: (P) 2 - No  Has your appetite been poor?: (P) No  How does the patient maintain a good energy level?: (P) Other  How would you after 65 12/14/2019 Please get once after your 65th birthday    Pneumococcal 23 (Pneumovax)  Covered Once after 65 12/15/2015 Please get once after your 65th birthday    Hepatitis B for Moderate/High Risk No vaccine history found Medium/high risk factors: had an in depth therapy session with Adalberto Guy about his tobacco use risks and options using the USPSTF's Five A's approach:    Ask: Carson Roca is using tobacco products. Assess:  We asked about/assessed behavioral health risk and factors affecting behavioral change including discussion about personal harm from his obesity and benefits of his losing weight. Agree:  Through a collaborative effort, we selected treatment goals of class through diet control, portion size restriction, increasing vegetab

## 2020-09-08 NOTE — ASSESSMENT & PLAN NOTE
Colonoscopy due on 08/13/2023  History of tubular adenoma. Last colonoscopy per Dr. Rach Babcock was normal in 2013. No change in bowel movements. No blood in stools or black stools. Appetite and weight has been stable.

## 2020-09-15 ENCOUNTER — OFFICE VISIT (OUTPATIENT)
Dept: PODIATRY CLINIC | Facility: CLINIC | Age: 65
End: 2020-09-15
Payer: COMMERCIAL

## 2020-09-15 DIAGNOSIS — L60.0 INGROWN TOENAIL OF RIGHT FOOT: ICD-10-CM

## 2020-09-15 DIAGNOSIS — B07.0 PLANTAR WART: ICD-10-CM

## 2020-09-15 DIAGNOSIS — E11.65 TYPE 2 DIABETES MELLITUS WITH HYPERGLYCEMIA, WITHOUT LONG-TERM CURRENT USE OF INSULIN (HCC): Primary | ICD-10-CM

## 2020-09-15 PROCEDURE — 99203 OFFICE O/P NEW LOW 30 MIN: CPT | Performed by: PODIATRIST

## 2020-09-15 PROCEDURE — G0463 HOSPITAL OUTPT CLINIC VISIT: HCPCS | Performed by: PODIATRIST

## 2020-09-15 NOTE — PROGRESS NOTES
HPI:    Patient ID: Lexus Mena is a 76year old male. Pleasant 43-year-old male presents as a new patient to me on referral from [address] [place] [address] patient states that he is here for a diabetic foot evaluation he was recently diagnosed.   The second concern every 8 (eight) hours as needed for Pain. Allergies:No Known Allergies   PHYSICAL EXAM:     Physical exam pulses are normal.  There is no evidence of edema nor erythema. Skin texture is good and hair growth is noted on his toes.   His nails are mod

## 2020-09-16 RX ORDER — LOSARTAN POTASSIUM 100 MG/1
TABLET ORAL
Qty: 90 TABLET | Refills: 1 | Status: SHIPPED | OUTPATIENT
Start: 2020-09-16 | End: 2021-03-08

## 2020-09-17 RX ORDER — LISINOPRIL 40 MG/1
40 TABLET ORAL DAILY
Qty: 90 TABLET | Refills: 1 | OUTPATIENT
Start: 2020-09-17

## 2020-09-17 NOTE — TELEPHONE ENCOUNTER
[facility] is asking for a refill on Lisinopril tablet but it is not listed in patient's current list of medications. It was previously by ordered by the doctor.

## 2020-09-28 ENCOUNTER — OFFICE VISIT (OUTPATIENT)
Dept: NEUROLOGY | Facility: CLINIC | Age: 65
End: 2020-09-28
Payer: COMMERCIAL

## 2020-09-28 ENCOUNTER — TELEPHONE (OUTPATIENT)
Dept: NEUROLOGY | Facility: CLINIC | Age: 65
End: 2020-09-28

## 2020-09-28 VITALS — HEIGHT: 76 IN | WEIGHT: 260 LBS | BODY MASS INDEX: 31.66 KG/M2

## 2020-09-28 DIAGNOSIS — M48.062 LUMBAR STENOSIS WITH NEUROGENIC CLAUDICATION: Primary | ICD-10-CM

## 2020-09-28 PROCEDURE — 99214 OFFICE O/P EST MOD 30 MIN: CPT | Performed by: PHYSICAL MEDICINE & REHABILITATION

## 2020-09-28 PROCEDURE — 3008F BODY MASS INDEX DOCD: CPT | Performed by: PHYSICAL MEDICINE & REHABILITATION

## 2020-09-28 NOTE — TELEPHONE ENCOUNTER
[facility] - PerNotification/Prior Authorization Procedure Code Inquiry, authorization is not required for Bilateral L4 transforaminal epidural steroid injection under fluoroscopy cpt codes [id number], [id number]. Reference number was not provided.  Will inform Nu

## 2020-09-28 NOTE — PROGRESS NOTES
HPI:    Patient ID: Dex Sneed is a 76year old male. 77-year-old male presents for follow-up. States that he is doing quite well for 1 day following bilateral L4 transforaminal epidural injection.   He states that he is so excited to manage martha tablet 1   • Glucose Blood (ACCU-CHEK GUIDE) In Vitro Strip 1 strip by In Vitro route daily. • Accu-Chek FastClix Lancets Does not apply Misc by In Vitro route daily.      • tamsulosin (FLOMAX) cap Take 1 capsule (0.4 mg total) by mouth 2 times daily af

## 2020-09-28 NOTE — PATIENT INSTRUCTIONS
Ask your chiropractor/physical therapist if they think you are ready for a walking program.    If Doctor has ordered an injection or MRI and if you do not hear from us within 3 business days please call the office or send a message through [phone] and ask

## 2020-09-28 NOTE — TELEPHONE ENCOUNTER
Patient has been scheduled for a Bilateral L4 transforaminal epidural steroid injection under fluoroscopy, local on 10/07/20 at the [place]. Medications and allergies reviewed.  Patient informed to hold aspirins, nsaids, blood thinners, multivitamins, vitamin

## 2020-10-07 ENCOUNTER — OFFICE VISIT (OUTPATIENT)
Dept: SURGERY | Facility: CLINIC | Age: 65
End: 2020-10-07

## 2020-10-07 DIAGNOSIS — M48.061 LUMBAR STENOSIS WITHOUT NEUROGENIC CLAUDICATION: Primary | ICD-10-CM

## 2020-10-07 PROCEDURE — 64483 NJX AA&/STRD TFRM EPI L/S 1: CPT | Performed by: PHYSICAL MEDICINE & REHABILITATION

## 2020-10-07 NOTE — PROCEDURES
Darius DAVIS 7.    BILATERAL LUMBAR TRANSFORAMINAL   NAME:  Kiarra Sparks    MR #:    [id number] :  1955     PHYSICIAN:  Jessie Chin        Operative Report    DATE OF PROCEDURE: 10/7/2020   PREOPERATIVE DIAGNOSES: 1.  Lumb to 2 cc of 1% PF lidocaine without epinephrine. Then, a 5 inch, 22-gauge spinal needle was inserted and directed towards the left L4-5 intervertebral foramen.   When it felt to be in good position, AP fluoroscopy was used to advance the needle to the 6 o'c

## 2020-10-09 ENCOUNTER — TELEPHONE (OUTPATIENT)
Dept: NEUROLOGY | Facility: CLINIC | Age: 65
End: 2020-10-09

## 2020-10-09 NOTE — TELEPHONE ENCOUNTER
Spoke to patient state he is doing much better. State this is the best he felt after getting the injection. Prior to injection pain was 7-8/10. Today the pain is 4-5/10, in the lower back . Patent state it is Much much better.  Looking forward to get to th

## 2020-10-19 ENCOUNTER — TELEPHONE (OUTPATIENT)
Dept: NEUROLOGY | Facility: CLINIC | Age: 65
End: 2020-10-19

## 2020-10-21 DIAGNOSIS — E11.65 TYPE 2 DIABETES MELLITUS WITH HYPERGLYCEMIA, WITHOUT LONG-TERM CURRENT USE OF INSULIN (HCC): ICD-10-CM

## 2020-10-23 RX ORDER — EMPAGLIFLOZIN 10 MG/1
TABLET, FILM COATED ORAL
Qty: 30 TABLET | Refills: 11 | Status: SHIPPED | OUTPATIENT
Start: 2020-10-23 | End: 2021-02-22

## 2020-10-27 ENCOUNTER — OFFICE VISIT (OUTPATIENT)
Dept: PODIATRY CLINIC | Facility: CLINIC | Age: 65
End: 2020-10-27
Payer: COMMERCIAL

## 2020-10-27 DIAGNOSIS — B07.0 PLANTAR WART: Primary | ICD-10-CM

## 2020-10-27 PROCEDURE — G0463 HOSPITAL OUTPT CLINIC VISIT: HCPCS | Performed by: PODIATRIST

## 2020-10-27 PROCEDURE — 99212 OFFICE O/P EST SF 10 MIN: CPT | Performed by: PODIATRIST

## 2020-10-27 NOTE — PROGRESS NOTES
HPI:    Patient ID: Lobito Howard is a 76year old male. XT 9year-old male presents for follow-up in reference to the lesion on the plantar aspect of the fifth metatarsal base of the right foot.   Patient states that with weightbearing he does not this patient in approximately 1 month           ASSESSMENT/PLAN:   Plantar wart  (primary encounter diagnosis)    No orders of the defined types were placed in this encounter.       Meds This Visit:  Requested Prescriptions      No prescriptions requested o

## 2020-10-30 ENCOUNTER — TELEPHONE (OUTPATIENT)
Dept: NEUROLOGY | Facility: CLINIC | Age: 65
End: 2020-10-30

## 2020-11-05 DIAGNOSIS — M16.12 PRIMARY OSTEOARTHRITIS OF LEFT HIP: ICD-10-CM

## 2020-11-06 NOTE — TELEPHONE ENCOUNTER
Spoke to patient, state we had stopped Diclofenac Sodium because primary Dr Mary Edwards had suggested that he be susceptible to covid if he keep taking the medication. So he stopped. Now patient is calling stating that he is very careful and really would like to be back on Diclofenac sodium because the pain is there and he rather get rid of the pain than worry about Covid because he is very careful. Patient is asking for a 90 day refill. Medication request: Diclofenac Sodium 75 mg oral tab. Take 1 tablet by mouth twice daily as needed for pain. #180.  No refills    LOV: 9/28/2020  NOV: none    [place]7/6/2020

## 2020-11-06 NOTE — TELEPHONE ENCOUNTER
Left a detailed message for patient to call the office back to schedule a follow up appointment with Dr. Heber Emerson.

## 2020-11-06 NOTE — TELEPHONE ENCOUNTER
Let's try to find other options for him. Have him stop the tizanidine, let's do a trial of flexeril and have him follow up sometime next week.

## 2020-11-09 RX ORDER — TIZANIDINE 2 MG/1
TABLET ORAL
Qty: 90 TABLET | Refills: 0 | Status: SHIPPED | OUTPATIENT
Start: 2020-11-09 | End: 2021-05-02

## 2020-11-10 ENCOUNTER — OFFICE VISIT (OUTPATIENT)
Dept: NEUROLOGY | Facility: CLINIC | Age: 65
End: 2020-11-10
Payer: COMMERCIAL

## 2020-11-10 VITALS — BODY MASS INDEX: 31.66 KG/M2 | HEIGHT: 76 IN | WEIGHT: 260 LBS

## 2020-11-10 DIAGNOSIS — M48.062 LUMBAR STENOSIS WITH NEUROGENIC CLAUDICATION: Primary | ICD-10-CM

## 2020-11-10 PROCEDURE — 3008F BODY MASS INDEX DOCD: CPT | Performed by: PHYSICAL MEDICINE & REHABILITATION

## 2020-11-10 PROCEDURE — 99214 OFFICE O/P EST MOD 30 MIN: CPT | Performed by: PHYSICAL MEDICINE & REHABILITATION

## 2020-11-10 RX ORDER — DICLOFENAC SODIUM 75 MG/1
TABLET, DELAYED RELEASE ORAL
Qty: 45 TABLET | Refills: 0 | OUTPATIENT
Start: 2020-11-10

## 2020-11-10 NOTE — PROGRESS NOTES
HPI:    Patient ID: Buster June is a 76year old male. 29-year-old male presents for follow-up. He continues to have relief following repeat bilateral for transforaminal epidural steroid injection under fluoroscopy that was done on 10/7/2020.   H by mouth 3 (three) times daily.  270 capsule 3   • TIZANIDINE HCL 2 MG Oral Tab TAKE 1 TABLET BY MOUTH  EVERYDAY AT BEDTIME (Patient not taking: Reported on 11/10/2020) 90 tablet 0   • Varenicline Tartrate (CHANTIX STARTING [place]TH [place]) 0.5 MG X 11 & 1 MG X 4

## 2020-11-11 ENCOUNTER — MED REC SCAN ONLY (OUTPATIENT)
Dept: NEUROLOGY | Facility: CLINIC | Age: 65
End: 2020-11-11

## 2020-11-19 ENCOUNTER — OFFICE VISIT (OUTPATIENT)
Dept: OPHTHALMOLOGY | Facility: CLINIC | Age: 65
End: 2020-11-19
Payer: COMMERCIAL

## 2020-11-19 DIAGNOSIS — E11.9 TYPE 2 DIABETES MELLITUS WITHOUT RETINOPATHY (HCC): Primary | ICD-10-CM

## 2020-11-19 DIAGNOSIS — H25.13 AGE-RELATED NUCLEAR CATARACT OF BOTH EYES: ICD-10-CM

## 2020-11-19 DIAGNOSIS — Z83.511 FAMILY HISTORY OF GLAUCOMA IN MATERNAL GRANDMOTHER: ICD-10-CM

## 2020-11-19 PROCEDURE — 92015 DETERMINE REFRACTIVE STATE: CPT | Performed by: OPHTHALMOLOGY

## 2020-11-19 PROCEDURE — 92004 COMPRE OPH EXAM NEW PT 1/>: CPT | Performed by: OPHTHALMOLOGY

## 2020-11-19 NOTE — PROGRESS NOTES
Camden Lanza is a 76year old male.     HPI:     HPI     Consult      Additional comments: Per Dr Lina Cuellar               Diabetic Eye Exam      Additional comments: Pt has been a diabetic for 6-9 months    Pt's diabetes is currently controlled by pills 12/14/19)      Medications:  Current Outpatient Medications   Medication Sig Dispense Refill   • TIZANIDINE HCL 2 MG Oral Tab TAKE 1 TABLET BY MOUTH  EVERYDAY AT BEDTIME 90 tablet 0   • JARDIANCE 10 MG Oral Tab TAKE 1 TABLET BY MOUTH  DAILY BEFORE DINNER 3 Dilation     Both eyes: 1.0% Mydriacyl and 2.5% Leo Synephrine @ 3:43 PM            Slit Lamp and Fundus Exam     Slit Lamp Exam       Right Left    Lids/Lashes Dermatochalasis, Meibomian gland dysfunction Dermatochalasis, Meibomian gland dysfunction    Co Discussed ocular and systemic benefits of blood sugar control. Diagnosis and treatment discussed in detail with patient. No orders of the defined types were placed in this encounter.       Meds This Visit:  Requested Prescriptions      No prescripti

## 2020-11-19 NOTE — ASSESSMENT & PLAN NOTE
Discussed moderate cataracts in both eyes that are not affecting vision and are not surgical at this time. Recommend bifocals for best corrected vision at distance and near.

## 2020-11-19 NOTE — PATIENT INSTRUCTIONS
Age-related nuclear cataract of both eyes  Discussed moderate cataracts in both eyes that are not affecting vision and are not surgical at this time. Recommend bifocals for best corrected vision at distance and near.      Family history of glaucoma in ma

## 2020-12-03 ENCOUNTER — LAB ENCOUNTER (OUTPATIENT)
Dept: LAB | Facility: HOSPITAL | Age: 65
End: 2020-12-03
Attending: INTERNAL MEDICINE
Payer: MEDICARE

## 2020-12-03 DIAGNOSIS — E55.9 VITAMIN D DEFICIENCY: ICD-10-CM

## 2020-12-03 DIAGNOSIS — E11.65 TYPE 2 DIABETES MELLITUS WITH HYPERGLYCEMIA, WITHOUT LONG-TERM CURRENT USE OF INSULIN (HCC): ICD-10-CM

## 2020-12-03 PROCEDURE — 83036 HEMOGLOBIN GLYCOSYLATED A1C: CPT

## 2020-12-03 PROCEDURE — 81001 URINALYSIS AUTO W/SCOPE: CPT

## 2020-12-03 PROCEDURE — 82043 UR ALBUMIN QUANTITATIVE: CPT

## 2020-12-03 PROCEDURE — 80053 COMPREHEN METABOLIC PANEL: CPT

## 2020-12-03 PROCEDURE — 82570 ASSAY OF URINE CREATININE: CPT

## 2020-12-03 PROCEDURE — 36415 COLL VENOUS BLD VENIPUNCTURE: CPT

## 2020-12-03 PROCEDURE — 85025 COMPLETE CBC W/AUTO DIFF WBC: CPT

## 2020-12-03 PROCEDURE — 82607 VITAMIN B-12: CPT

## 2020-12-03 PROCEDURE — 80061 LIPID PANEL: CPT

## 2020-12-03 PROCEDURE — 82306 VITAMIN D 25 HYDROXY: CPT

## 2020-12-15 NOTE — TELEPHONE ENCOUNTER
Pt seen and evaluated by Ophtho this admission recs appreciated.  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 No infectious infiltrate or ulceration appreciated  - c/w moxiflox QID to both eyes  - c/w PO prednisone currently at 30 mg, per primary team. Patient with acute mental status change possibly related to steroids; if need to be held for this reason, please let ophthalmology know  - Conjunctival biopsy taken Friday OD, will f/u results - results not back  - given recent weight loss and b/l nature of corneal pathology, malignancy workup was done to evaluate for underlying disease process, unrevealing to date   - c/w vitamin C 1g daily and doxycycline 100mg BID daily as per primary team if no contraindications  - recommend daily vitamin A supplementation given vitamin A deficiency is associated with corneal melting and patient with poor PO intake  - ophthalmology will monitor closely  - findings discussed with patient and primary team    # h/o glaucoma  - Hold off on cosopt for now    Cosopt discontinued per above recs, s/w Pharmacy Vitamin A supplement not available at [facility] pharmacy Spoke to patient notified that disability  paperwork was faxed on the 14th of July to number [phone]. After it was faxed the paperwork was sent to scanning and as of today 8/18/2020 there is no paperwork scanned into Epic.    Patient asked for a copy n

## 2020-12-22 RX ORDER — ATORVASTATIN CALCIUM 10 MG/1
10 TABLET, FILM COATED ORAL DAILY
Qty: 90 TABLET | Refills: 1 | Status: SHIPPED | OUTPATIENT
Start: 2020-12-22 | End: 2021-03-10

## 2020-12-22 RX ORDER — PANTOPRAZOLE SODIUM 40 MG/1
40 TABLET, DELAYED RELEASE ORAL
Qty: 90 TABLET | Refills: 1 | Status: SHIPPED | OUTPATIENT
Start: 2020-12-22 | End: 2021-03-10

## 2020-12-22 RX ORDER — GABAPENTIN 100 MG/1
CAPSULE ORAL
Qty: 270 CAPSULE | Refills: 3 | Status: SHIPPED | OUTPATIENT
Start: 2020-12-22 | End: 2021-03-08

## 2021-01-25 DIAGNOSIS — Z23 NEED FOR VACCINATION: ICD-10-CM

## 2021-02-05 ENCOUNTER — TELEPHONE (OUTPATIENT)
Dept: SURGERY | Facility: CLINIC | Age: 66
End: 2021-02-05

## 2021-02-05 NOTE — TELEPHONE ENCOUNTER
Dr. Khadijah Kraft please verify if you wanted patient to continue tamsulosin     ----- Message from Osmani Rodriguez sent at 2/5/2021  1:04 PM CST -----  Regarding: Prescription Question  Contact: [phone]  Hi Dr. Khadijah Kraft,  I changed insurance companies and I need all my prescriptions faxed to the following  22-15-02-30.

## 2021-02-22 DIAGNOSIS — E11.65 TYPE 2 DIABETES MELLITUS WITH HYPERGLYCEMIA, WITHOUT LONG-TERM CURRENT USE OF INSULIN (HCC): ICD-10-CM

## 2021-02-22 RX ORDER — EMPAGLIFLOZIN 10 MG/1
1 TABLET, FILM COATED ORAL DAILY
Qty: 90 TABLET | Refills: 1 | Status: SHIPPED | OUTPATIENT
Start: 2021-02-22

## 2021-02-25 ENCOUNTER — TELEPHONE (OUTPATIENT)
Dept: NEUROLOGY | Facility: CLINIC | Age: 66
End: 2021-02-25

## 2021-02-25 ENCOUNTER — OFFICE VISIT (OUTPATIENT)
Dept: NEUROLOGY | Facility: CLINIC | Age: 66
End: 2021-02-25
Payer: MEDICARE

## 2021-02-25 VITALS
HEIGHT: 76 IN | WEIGHT: 260 LBS | RESPIRATION RATE: 20 BRPM | BODY MASS INDEX: 31.66 KG/M2 | HEART RATE: 76 BPM | SYSTOLIC BLOOD PRESSURE: 128 MMHG | DIASTOLIC BLOOD PRESSURE: 68 MMHG

## 2021-02-25 DIAGNOSIS — M48.062 LUMBAR STENOSIS WITH NEUROGENIC CLAUDICATION: Primary | ICD-10-CM

## 2021-02-25 PROCEDURE — 3074F SYST BP LT 130 MM HG: CPT | Performed by: PHYSICAL MEDICINE & REHABILITATION

## 2021-02-25 PROCEDURE — 3008F BODY MASS INDEX DOCD: CPT | Performed by: PHYSICAL MEDICINE & REHABILITATION

## 2021-02-25 PROCEDURE — 99214 OFFICE O/P EST MOD 30 MIN: CPT | Performed by: PHYSICAL MEDICINE & REHABILITATION

## 2021-02-25 PROCEDURE — 3078F DIAST BP <80 MM HG: CPT | Performed by: PHYSICAL MEDICINE & REHABILITATION

## 2021-02-25 RX ORDER — DICLOFENAC SODIUM 75 MG/1
75 TABLET, DELAYED RELEASE ORAL 2 TIMES DAILY PRN
Qty: 180 TABLET | Refills: 0 | Status: SHIPPED | OUTPATIENT
Start: 2021-02-25 | End: 2021-05-03

## 2021-02-25 NOTE — TELEPHONE ENCOUNTER
Contacted Lindsay Kendrick at [employer] Case/Reference # [id number] to initiate authorization for Left L4 transforaminal epidural steroid injection  CPT 50961 dx:M48.062 to be done at [facility].     Status: Approved with authorization #[id number] effective 2/25/2021-8/24/20

## 2021-02-25 NOTE — PROGRESS NOTES
Progress note    C/C: low back, left leg pain    HPI: 77 yo m h/o DM presents for f/u. Increased bilateral lower back and left leg pain radiating to the calf, constant. Back pain worse than leg pain. Left side of the back much more painful than the right.

## 2021-02-25 NOTE — PATIENT INSTRUCTIONS
Stop the diclofenac 24 hours before injection, if we end up proceeding. You may take tizanidine and gabapentin as you normally do. Do not take diclofenac while you are taking motrin.

## 2021-02-26 NOTE — TELEPHONE ENCOUNTER
Patient has been scheduled for a Left L4 transforaminal epidural steroid injection under fluoroscopy, local  on 03/03/21 at the [facility]. Medications and allergies reviewed.  Patient informed we will need verbal or written authorization from patients Primary Ca

## 2021-03-03 ENCOUNTER — OFFICE VISIT (OUTPATIENT)
Dept: SURGERY | Facility: CLINIC | Age: 66
End: 2021-03-03

## 2021-03-03 DIAGNOSIS — M48.061 LUMBAR STENOSIS WITHOUT NEUROGENIC CLAUDICATION: Primary | ICD-10-CM

## 2021-03-03 DIAGNOSIS — M54.16 LUMBAR RADICULOPATHY: ICD-10-CM

## 2021-03-03 PROCEDURE — 64483 NJX AA&/STRD TFRM EPI L/S 1: CPT | Performed by: PHYSICAL MEDICINE & REHABILITATION

## 2021-03-03 NOTE — PROCEDURES
Darius DAVIS 7.    LUMBAR TRANSFORAMINAL   NAME:  Gwen Barrios    MR #:    [id number] :  1955     PHYSICIAN:  Go Turcios        Operative Report    DATE OF PROCEDURE: 3/3/2021   PREOPERATIVE DIAGNOSES: 1.  Lumbar stenosis given discharge instructions and will follow up in the clinic as scheduled. Throughout the whole procedure, the patient's pulse oximetry and vital signs were monitored and they remained completely stable.   Also, throughout the whole procedure, prior to in

## 2021-03-06 DIAGNOSIS — M48.062 LUMBAR STENOSIS WITH NEUROGENIC CLAUDICATION: ICD-10-CM

## 2021-03-06 NOTE — TELEPHONE ENCOUNTER
Current Outpatient Medications   Medication Sig Dispense Refill   • GABAPENTIN 100 MG Oral Cap TAKE 1 CAPSULE BY MOUTH 3  TIMES DAILY (Patient taking differently: 200 mg 2 (two) times daily.  ) 270 capsule 3   • atorvastatin 10 MG Oral Tab Take 1 tablet (1

## 2021-03-08 RX ORDER — DICLOFENAC SODIUM 75 MG/1
TABLET, DELAYED RELEASE ORAL
Qty: 180 TABLET | Refills: 0 | OUTPATIENT
Start: 2021-03-08

## 2021-03-08 NOTE — TELEPHONE ENCOUNTER
Diclofenac # 180 take 2 times a day prn prescribed on 2/25/21. Too soon for refill request, denied request at this time.

## 2021-03-10 RX ORDER — GABAPENTIN 100 MG/1
100 CAPSULE ORAL 3 TIMES DAILY
Qty: 270 CAPSULE | Refills: 1 | Status: SHIPPED | OUTPATIENT
Start: 2021-03-10 | End: 2021-03-10

## 2021-03-10 RX ORDER — ATORVASTATIN CALCIUM 10 MG/1
10 TABLET, FILM COATED ORAL DAILY
Qty: 90 TABLET | Refills: 1 | Status: SHIPPED | OUTPATIENT
Start: 2021-03-10 | End: 2021-03-10

## 2021-03-10 RX ORDER — PANTOPRAZOLE SODIUM 40 MG/1
40 TABLET, DELAYED RELEASE ORAL
Qty: 90 TABLET | Refills: 1 | Status: SHIPPED | OUTPATIENT
Start: 2021-03-10 | End: 2021-03-10

## 2021-03-10 RX ORDER — PANTOPRAZOLE SODIUM 40 MG/1
40 TABLET, DELAYED RELEASE ORAL
Qty: 90 TABLET | Refills: 1 | Status: SHIPPED | OUTPATIENT
Start: 2021-03-10

## 2021-03-10 RX ORDER — LOSARTAN POTASSIUM 100 MG/1
100 TABLET ORAL DAILY
Qty: 90 TABLET | Refills: 1 | Status: SHIPPED | OUTPATIENT
Start: 2021-03-10 | End: 2021-05-02

## 2021-03-10 RX ORDER — ATORVASTATIN CALCIUM 10 MG/1
10 TABLET, FILM COATED ORAL DAILY
Qty: 90 TABLET | Refills: 1 | Status: SHIPPED | OUTPATIENT
Start: 2021-03-10

## 2021-03-10 RX ORDER — LOSARTAN POTASSIUM 100 MG/1
100 TABLET ORAL DAILY
Qty: 90 TABLET | Refills: 1 | Status: SHIPPED | OUTPATIENT
Start: 2021-03-10 | End: 2021-03-10

## 2021-03-10 RX ORDER — GABAPENTIN 100 MG/1
100 CAPSULE ORAL 3 TIMES DAILY
Qty: 270 CAPSULE | Refills: 1 | Status: SHIPPED | OUTPATIENT
Start: 2021-03-10 | End: 2021-04-07

## 2021-03-10 NOTE — TELEPHONE ENCOUNTER
Was sent to the wrong pharmacy. Resent to the mail order pharmacy. The patient was made aware was sent to the correct pharmacy.

## 2021-03-11 ENCOUNTER — OFFICE VISIT (OUTPATIENT)
Dept: NEUROLOGY | Facility: CLINIC | Age: 66
End: 2021-03-11
Payer: MEDICARE

## 2021-03-11 ENCOUNTER — TELEPHONE (OUTPATIENT)
Dept: NEUROLOGY | Facility: CLINIC | Age: 66
End: 2021-03-11

## 2021-03-11 VITALS — BODY MASS INDEX: 31.66 KG/M2 | HEIGHT: 76 IN | WEIGHT: 260 LBS

## 2021-03-11 DIAGNOSIS — M48.062 LUMBAR STENOSIS WITH NEUROGENIC CLAUDICATION: Primary | ICD-10-CM

## 2021-03-11 PROCEDURE — 3008F BODY MASS INDEX DOCD: CPT | Performed by: PHYSICAL MEDICINE & REHABILITATION

## 2021-03-11 PROCEDURE — 99214 OFFICE O/P EST MOD 30 MIN: CPT | Performed by: PHYSICAL MEDICINE & REHABILITATION

## 2021-03-11 RX ORDER — HYDROCODONE BITARTRATE AND ACETAMINOPHEN 5; 325 MG/1; MG/1
TABLET ORAL
Qty: 2 TABLET | Refills: 0 | Status: SHIPPED | OUTPATIENT
Start: 2021-03-11 | End: 2021-06-17

## 2021-03-11 NOTE — PROGRESS NOTES
Progress note    C/C: low back and left thigh pain    HPI: 58-year-old male presents for follow-up. Underwent left L4 transforaminal epidural steroid injection on 3/3/2021.   Near complete relief that only lasted a day, and then the symptoms returned and h

## 2021-03-11 NOTE — PATIENT INSTRUCTIONS
Increase the gabapentin to 300mg twice daily in the meantime. Have the MRI done; take the pain medication as instructed for the MRI. If the one dose works for you, then you do not need to use the second dose.

## 2021-03-11 NOTE — TELEPHONE ENCOUNTER
[facility] for authorization of approval for MRI L-spine wo cpt code 26411. Authorization Number: [id number] effective 03/11/21 to 09/07/21. Pt.  informed of approval. Transferred call to scheduling for appt.

## 2021-03-12 ENCOUNTER — TELEPHONE (OUTPATIENT)
Dept: CASE MANAGEMENT | Age: 66
End: 2021-03-12

## 2021-03-12 NOTE — TELEPHONE ENCOUNTER
Patient is eligible for a 2021 Medicare Annual Wellness visit. Left message to call back [phone].

## 2021-03-24 RX ORDER — TAMSULOSIN HYDROCHLORIDE 0.4 MG/1
0.4 CAPSULE ORAL
Qty: 180 CAPSULE | Refills: 1 | OUTPATIENT
Start: 2021-03-24

## 2021-03-24 NOTE — TELEPHONE ENCOUNTER
S/w pt; identity verified with name & . Pt states \" this issue was addressed about a month ago with Dr. Felipe Luis & I don't need to take the Tamsulosin any more. \"  When questioned, he responded, \"I'm not having any problems urinating. \"  [facility] [place] notified Rx discontinued. Pt stated insurance changed beginning of the new year [facility]). Encounter complete.

## 2021-03-31 ENCOUNTER — HOSPITAL ENCOUNTER (OUTPATIENT)
Dept: MRI IMAGING | Facility: HOSPITAL | Age: 66
Discharge: HOME | End: 2021-03-31
Attending: PHYSICAL MEDICINE & REHABILITATION
Payer: MEDICARE

## 2021-03-31 DIAGNOSIS — M48.062 LUMBAR STENOSIS WITH NEUROGENIC CLAUDICATION: ICD-10-CM

## 2021-03-31 PROCEDURE — 72148 MRI LUMBAR SPINE W/O DYE: CPT | Performed by: PHYSICAL MEDICINE & REHABILITATION

## 2021-04-07 ENCOUNTER — OFFICE VISIT (OUTPATIENT)
Dept: NEUROLOGY | Facility: CLINIC | Age: 66
End: 2021-04-07
Payer: MEDICARE

## 2021-04-07 ENCOUNTER — TELEPHONE (OUTPATIENT)
Dept: NEUROLOGY | Facility: CLINIC | Age: 66
End: 2021-04-07

## 2021-04-07 VITALS — BODY MASS INDEX: 31.66 KG/M2 | WEIGHT: 260 LBS | HEIGHT: 76 IN

## 2021-04-07 DIAGNOSIS — M51.26 HERNIATED NUCLEUS PULPOSUS, L3-4 LEFT: Primary | ICD-10-CM

## 2021-04-07 PROCEDURE — 3008F BODY MASS INDEX DOCD: CPT | Performed by: PHYSICAL MEDICINE & REHABILITATION

## 2021-04-07 PROCEDURE — 99214 OFFICE O/P EST MOD 30 MIN: CPT | Performed by: PHYSICAL MEDICINE & REHABILITATION

## 2021-04-07 RX ORDER — GABAPENTIN 300 MG/1
300 CAPSULE ORAL 3 TIMES DAILY
Qty: 270 CAPSULE | Refills: 0 | Status: SHIPPED | OUTPATIENT
Start: 2021-04-07 | End: 2021-06-23

## 2021-04-07 NOTE — PROGRESS NOTES
Progress note    C/C: Low back, left leg pain    HPI: 68-year-old male presents for follow-up, discuss MRI results of the lumbar spine.   He continues to have left leg pain radiating to the posterior medial calf stopping above the medial malleolus along wit

## 2021-04-07 NOTE — TELEPHONE ENCOUNTER
Dr. Angela Maradiaga discontinued order for left L4 and L5 TFESIs and placed new order for Left L3 and L4 TFESIs    Contacted Natividad PADILLA clinical reviewer at [address] to update levels to Left L3 and L4 (same CPT codes).  Donald Mcfarland updated levels with same authorization # [id number]

## 2021-04-07 NOTE — TELEPHONE ENCOUNTER
Patient has been scheduled for a Left L3 and L4 transforaminal epidural steroid injections  on 4/21/21 at the [facility]. Medications and allergies reviewed.  Patient informed we will need verbal or written authorization from patients Primary Care Physician/Cardi

## 2021-04-07 NOTE — TELEPHONE ENCOUNTER
Contacted Duc JOSÉ at [facility] Case/Reference # [id number] to initiate authorization for Left L4 and L5 TFESIs CPT 66732+21342 dx:M51.26 to be done at [facility].     Status: Approved with authorization #[id number] valid 4/7/21-10/4/21    Routing to clinical staff to

## 2021-04-21 ENCOUNTER — OFFICE VISIT (OUTPATIENT)
Dept: SURGERY | Facility: CLINIC | Age: 66
End: 2021-04-21

## 2021-04-21 DIAGNOSIS — M54.16 LUMBAR RADICULOPATHY: Primary | ICD-10-CM

## 2021-04-21 DIAGNOSIS — M51.26 HERNIATED NUCLEUS PULPOSUS, L3-4: ICD-10-CM

## 2021-04-21 PROCEDURE — 64484 NJX AA&/STRD TFRM EPI L/S EA: CPT | Performed by: PHYSICAL MEDICINE & REHABILITATION

## 2021-04-21 PROCEDURE — 64483 NJX AA&/STRD TFRM EPI L/S 1: CPT | Performed by: PHYSICAL MEDICINE & REHABILITATION

## 2021-04-21 NOTE — PROCEDURES
Darius DAVIS 7.    2-LEVEL LUMBAR TRANSFORAMINAL   NAME:  Manuel Anderson    MR #:    [id number] :  1955     PHYSICIAN:  Jenn Short        Operative Report    DATE OF PROCEDURE: 2021   PREOPERATIVE DIAGNOSES: 1.  Lumbar After this, the needles were removed. Each site was cleaned. Band-Aids were applied. The patient was transferred to the cart and into [facility]. The patient was given discharge instructions and will follow up in the clinic as scheduled.   Throughout the whole

## 2021-04-30 ENCOUNTER — TELEPHONE (OUTPATIENT)
Dept: INTERNAL MEDICINE CLINIC | Facility: CLINIC | Age: 66
End: 2021-04-30

## 2021-04-30 ENCOUNTER — IMMUNIZATION (OUTPATIENT)
Dept: LAB | Facility: HOSPITAL | Age: 66
End: 2021-04-30
Attending: EMERGENCY MEDICINE
Payer: MEDICARE

## 2021-04-30 DIAGNOSIS — Z23 NEED FOR VACCINATION: Primary | ICD-10-CM

## 2021-04-30 DIAGNOSIS — M48.062 LUMBAR STENOSIS WITH NEUROGENIC CLAUDICATION: ICD-10-CM

## 2021-04-30 PROCEDURE — 0011A SARSCOV2 VAC 100MCG/0.5ML IM: CPT

## 2021-05-02 RX ORDER — LOSARTAN POTASSIUM 100 MG/1
TABLET ORAL
Qty: 90 TABLET | Refills: 1 | Status: SHIPPED | OUTPATIENT
Start: 2021-05-02

## 2021-05-02 RX ORDER — TIZANIDINE 2 MG/1
TABLET ORAL
Qty: 90 TABLET | Refills: 0 | Status: SHIPPED | OUTPATIENT
Start: 2021-05-02

## 2021-05-03 RX ORDER — DICLOFENAC SODIUM 75 MG/1
TABLET, DELAYED RELEASE ORAL
Qty: 45 TABLET | Refills: 0 | Status: SHIPPED | OUTPATIENT
Start: 2021-05-03 | End: 2021-06-08

## 2021-05-03 NOTE — TELEPHONE ENCOUNTER
Medication request: Diclofenac 75mg Take 1 tablet (75 mg total) by mouth 2 (two) times daily as needed.     LOV: 04/21/21 FRANCISCO JAVIER  NOV: none    [place] 02/25/21 #180 r-0

## 2021-05-27 ENCOUNTER — TELEPHONE (OUTPATIENT)
Dept: INTERNAL MEDICINE CLINIC | Facility: CLINIC | Age: 66
End: 2021-05-27

## 2021-05-28 ENCOUNTER — IMMUNIZATION (OUTPATIENT)
Dept: LAB | Facility: HOSPITAL | Age: 66
End: 2021-05-28
Attending: EMERGENCY MEDICINE
Payer: MEDICARE

## 2021-05-28 DIAGNOSIS — Z23 NEED FOR VACCINATION: Primary | ICD-10-CM

## 2021-05-28 PROCEDURE — 0012A SARSCOV2 VAC 100MCG/0.5ML IM: CPT

## 2021-06-02 DIAGNOSIS — M51.26 HERNIATED NUCLEUS PULPOSUS, L3-4 LEFT: ICD-10-CM

## 2021-06-03 DIAGNOSIS — M48.062 LUMBAR STENOSIS WITH NEUROGENIC CLAUDICATION: ICD-10-CM

## 2021-06-03 RX ORDER — GABAPENTIN 300 MG/1
CAPSULE ORAL
Qty: 270 CAPSULE | Refills: 0 | OUTPATIENT
Start: 2021-06-03

## 2021-06-03 NOTE — TELEPHONE ENCOUNTER
[facility]    Medication request: Diclofenac 75mg TAKE 1 TABLET BY MOUTH  TWICE DAILY AS NEEDED    LOV: 04/21/21  NOV: none    [place] 05/03/21 #45 r-0

## 2021-06-08 RX ORDER — DICLOFENAC SODIUM 75 MG/1
TABLET, DELAYED RELEASE ORAL
Qty: 180 TABLET | Refills: 0 | Status: SHIPPED | OUTPATIENT
Start: 2021-06-08 | End: 2021-08-27

## 2021-06-08 NOTE — TELEPHONE ENCOUNTER
Patient states controlled symptoms with this medication, no side effects.   Patient requesting a 90 day supply at mail order pharmacy

## 2021-06-15 ENCOUNTER — TELEPHONE (OUTPATIENT)
Dept: CASE MANAGEMENT | Age: 66
End: 2021-06-15

## 2021-06-15 NOTE — TELEPHONE ENCOUNTER
Patient is eligible for a 2021 Medicare Annual Wellness visit. Left message to call back [phone].

## 2021-06-17 ENCOUNTER — OFFICE VISIT (OUTPATIENT)
Dept: NEUROLOGY | Facility: CLINIC | Age: 66
End: 2021-06-17
Payer: MEDICARE

## 2021-06-17 VITALS
WEIGHT: 260 LBS | BODY MASS INDEX: 31.66 KG/M2 | HEART RATE: 73 BPM | SYSTOLIC BLOOD PRESSURE: 130 MMHG | OXYGEN SATURATION: 98 % | HEIGHT: 76 IN | DIASTOLIC BLOOD PRESSURE: 70 MMHG

## 2021-06-17 DIAGNOSIS — M54.14 THORACIC RADICULITIS: Primary | ICD-10-CM

## 2021-06-17 PROCEDURE — 3008F BODY MASS INDEX DOCD: CPT | Performed by: PHYSICAL MEDICINE & REHABILITATION

## 2021-06-17 PROCEDURE — 3075F SYST BP GE 130 - 139MM HG: CPT | Performed by: PHYSICAL MEDICINE & REHABILITATION

## 2021-06-17 PROCEDURE — 99214 OFFICE O/P EST MOD 30 MIN: CPT | Performed by: PHYSICAL MEDICINE & REHABILITATION

## 2021-06-17 PROCEDURE — 3078F DIAST BP <80 MM HG: CPT | Performed by: PHYSICAL MEDICINE & REHABILITATION

## 2021-06-17 RX ORDER — DOXEPIN HYDROCHLORIDE 10 MG/1
CAPSULE ORAL
Qty: 60 CAPSULE | Refills: 0 | Status: SHIPPED | OUTPATIENT
Start: 2021-06-17 | End: 2021-08-27

## 2021-06-17 NOTE — PROGRESS NOTES
Progress note    C/C: right flank, scapular pain    HPI: 62-year-old male presents for follow-up with a new issue.   Over the past 3 weeks he has been having right scapular pain, and over the last 2 weeks has developed burning, and sensitivity over an area [address] [place] [address]

## 2021-06-17 NOTE — PATIENT INSTRUCTIONS
Please send me a message through [employer] and let me know how you are doing in 2 weeks. Please also let me know if you have any skin changes over the painful area along the right side.

## 2021-06-23 DIAGNOSIS — M51.26 HERNIATED NUCLEUS PULPOSUS, L3-4 LEFT: ICD-10-CM

## 2021-06-23 RX ORDER — GABAPENTIN 300 MG/1
300 CAPSULE ORAL 3 TIMES DAILY
Qty: 270 CAPSULE | Refills: 2 | Status: SHIPPED | OUTPATIENT
Start: 2021-06-23 | End: 2022-04-05

## 2021-06-23 NOTE — TELEPHONE ENCOUNTER
Medication request: Gabapentin 300mg Take 1 capsule (300 mg total) by mouth 3 (three) times daily.     LOV: 06/17/21  NOV: none    [place] 04/07/21 #270 r-0

## 2021-07-13 DIAGNOSIS — M54.14 THORACIC RADICULITIS: ICD-10-CM

## 2021-07-13 NOTE — TELEPHONE ENCOUNTER
LMTCB - Recommend treating symptomatically with doxepin 10-20mg at bedtime; continue current doses of gabapentin and diclofenac for pain attributed to lumbar stenosis. He was also instructed to keep an eye out for skin changes along the right flank.   He wi

## 2021-07-22 RX ORDER — DOXEPIN HYDROCHLORIDE 10 MG/1
CAPSULE ORAL
Qty: 60 CAPSULE | Refills: 0 | OUTPATIENT
Start: 2021-07-22

## 2021-08-24 DIAGNOSIS — M48.062 LUMBAR STENOSIS WITH NEUROGENIC CLAUDICATION: ICD-10-CM

## 2021-08-24 DIAGNOSIS — M54.14 THORACIC RADICULITIS: ICD-10-CM

## 2021-08-24 NOTE — TELEPHONE ENCOUNTER
Medication request: Diclofenac 75mg TAKE 1 TABLET 2 TIMES DAILYAS NEEDED    [place]: 06/08/21 #180 r-0    Medication request: Doxepin 1-2 cap PO at bedtime    [place]: 06/17/21 #60 r-0    LOV: 06/17/21  NOV: 09/07/21

## 2021-08-27 RX ORDER — DOXEPIN HYDROCHLORIDE 10 MG/1
CAPSULE ORAL
Qty: 60 CAPSULE | Refills: 0 | Status: SHIPPED | OUTPATIENT
Start: 2021-08-27

## 2021-08-27 RX ORDER — DICLOFENAC SODIUM 75 MG/1
TABLET, DELAYED RELEASE ORAL
Qty: 180 TABLET | Refills: 0 | Status: SHIPPED | OUTPATIENT
Start: 2021-08-27 | End: 2021-11-15

## 2021-08-31 ENCOUNTER — TELEPHONE (OUTPATIENT)
Dept: NEUROLOGY | Facility: CLINIC | Age: 66
End: 2021-08-31

## 2021-09-03 ENCOUNTER — TELEPHONE (OUTPATIENT)
Dept: PHYSICAL MEDICINE AND REHAB | Facility: CLINIC | Age: 66
End: 2021-09-03

## 2021-10-29 ENCOUNTER — TELEPHONE (OUTPATIENT)
Dept: SURGERY | Facility: CLINIC | Age: 66
End: 2021-10-29

## 2021-10-29 DIAGNOSIS — C61 PROSTATE CANCER (HCC): Primary | ICD-10-CM

## 2021-11-01 ENCOUNTER — LAB ENCOUNTER (OUTPATIENT)
Dept: LAB | Facility: HOSPITAL | Age: 66
End: 2021-11-01
Attending: UROLOGY
Payer: MEDICARE

## 2021-11-01 DIAGNOSIS — C61 PROSTATE CANCER (HCC): ICD-10-CM

## 2021-11-01 PROCEDURE — 36415 COLL VENOUS BLD VENIPUNCTURE: CPT

## 2021-11-01 PROCEDURE — 84153 ASSAY OF PSA TOTAL: CPT

## 2021-11-02 ENCOUNTER — OFFICE VISIT (OUTPATIENT)
Dept: SURGERY | Facility: CLINIC | Age: 66
End: 2021-11-02
Payer: MEDICARE

## 2021-11-02 VITALS
BODY MASS INDEX: 31.66 KG/M2 | DIASTOLIC BLOOD PRESSURE: 61 MMHG | RESPIRATION RATE: 16 BRPM | SYSTOLIC BLOOD PRESSURE: 118 MMHG | HEART RATE: 70 BPM | WEIGHT: 260 LBS | HEIGHT: 76 IN

## 2021-11-02 DIAGNOSIS — C61 PROSTATE CANCER (HCC): Primary | ICD-10-CM

## 2021-11-02 DIAGNOSIS — N52.9 ERECTILE DYSFUNCTION, UNSPECIFIED ERECTILE DYSFUNCTION TYPE: ICD-10-CM

## 2021-11-02 PROCEDURE — 3008F BODY MASS INDEX DOCD: CPT | Performed by: UROLOGY

## 2021-11-02 PROCEDURE — 3078F DIAST BP <80 MM HG: CPT | Performed by: UROLOGY

## 2021-11-02 PROCEDURE — 99213 OFFICE O/P EST LOW 20 MIN: CPT | Performed by: UROLOGY

## 2021-11-02 PROCEDURE — 3074F SYST BP LT 130 MM HG: CPT | Performed by: UROLOGY

## 2021-11-02 RX ORDER — SILDENAFIL 100 MG/1
100 TABLET, FILM COATED ORAL
Qty: 4 TABLET | Refills: 11 | Status: SHIPPED | OUTPATIENT
Start: 2021-11-02

## 2021-11-02 NOTE — PROGRESS NOTES
Mame Ponce is a 78year old male.     HPI:   Patient presents with:  Prostate Cancer    23-year-old male with a history of high risk prostate cancer clinical T1c Phil 4+5 PSA 23 diagnosed on saturation biopsy March 14, 2018 - metastatic work-up b Smokeless tobacco: Never Used    Vaping Use      Vaping Use: Never used    Alcohol use: Yes      Comment: 1 beer daily x20 years (as of 12/14/19)    Drug use: Yes      Types: Cannabis      Comment: 1-2x/week (as of 12/14/19)       Medications (Active prior Liang White MD

## 2021-11-12 ENCOUNTER — TELEPHONE (OUTPATIENT)
Dept: NEUROLOGY | Facility: CLINIC | Age: 66
End: 2021-11-12

## 2021-11-15 DIAGNOSIS — M48.062 LUMBAR STENOSIS WITH NEUROGENIC CLAUDICATION: ICD-10-CM

## 2021-11-15 RX ORDER — DICLOFENAC SODIUM 75 MG/1
TABLET, DELAYED RELEASE ORAL
Qty: 180 TABLET | Refills: 0 | Status: SHIPPED | OUTPATIENT
Start: 2021-11-15

## 2021-11-15 NOTE — TELEPHONE ENCOUNTER
Medication request: DICLOFENAC 75 MG Oral Tab EC  Sig:   TAKE 1 TABLET 2 TIMES DAILYAS NEEDED  Qty#180R-0    LOV: 6/17/21  NOV: 11/18/21    [place]: 8/30/21  (90D supply)

## 2021-11-18 ENCOUNTER — TELEPHONE (OUTPATIENT)
Dept: PHYSICAL MEDICINE AND REHAB | Facility: CLINIC | Age: 66
End: 2021-11-18

## 2021-11-18 ENCOUNTER — OFFICE VISIT (OUTPATIENT)
Dept: PHYSICAL MEDICINE AND REHAB | Facility: CLINIC | Age: 66
End: 2021-11-18
Payer: MEDICARE

## 2021-11-18 VITALS
DIASTOLIC BLOOD PRESSURE: 70 MMHG | HEIGHT: 76 IN | OXYGEN SATURATION: 95 % | BODY MASS INDEX: 31.66 KG/M2 | WEIGHT: 260 LBS | SYSTOLIC BLOOD PRESSURE: 154 MMHG | HEART RATE: 76 BPM

## 2021-11-18 DIAGNOSIS — M54.16 LEFT LUMBAR RADICULITIS: Primary | ICD-10-CM

## 2021-11-18 PROCEDURE — 3078F DIAST BP <80 MM HG: CPT | Performed by: PHYSICAL MEDICINE & REHABILITATION

## 2021-11-18 PROCEDURE — 99214 OFFICE O/P EST MOD 30 MIN: CPT | Performed by: PHYSICAL MEDICINE & REHABILITATION

## 2021-11-18 PROCEDURE — 3077F SYST BP >= 140 MM HG: CPT | Performed by: PHYSICAL MEDICINE & REHABILITATION

## 2021-11-18 PROCEDURE — 3008F BODY MASS INDEX DOCD: CPT | Performed by: PHYSICAL MEDICINE & REHABILITATION

## 2021-11-18 NOTE — TELEPHONE ENCOUNTER
Please change location to CFS      Patient has been scheduled for Left L4 and L5 transforaminal epidural steroid injections on 11/19/21 at the [address] with Dr. Jorge Asif.    -Anesthesia type: Local.  -If receiving MAC or IVC sedation patient will need to get COV

## 2021-11-18 NOTE — TELEPHONE ENCOUNTER
Initiated authorization for Left L4 and L5 transforaminal epidural steroid injections CPT 98521+78256 dx:M54.16 to be done at [facility] with [employer]  Case #[id number].     Status: Approved with authorization [id number] valid 11/18/21-5/17/22

## 2021-11-18 NOTE — PROGRESS NOTES
Progress note    C/C: low back, left leg pain    HPI: 78-year-old male presents for follow-up. Was doing well for some months, was recently doing some home improvement projects involving putting up drywall, building a laundry room.   He was initially [place] right    Imaging: No new imaging to review    Assessment and plan  1. Left lumbar radiculitis  2. Lumbar stenosis    Recommend left L4 and L5 TFESI under fluoroscopy, local. Risks discussed, including bleeding, infection, nerve injury, HA.  To be done at [facility]

## 2021-11-18 NOTE — TELEPHONE ENCOUNTER
Contacted Rosette MARKS at [address] and updated facility to [facility]  Authorization # and valid dates remain the same

## 2021-11-19 ENCOUNTER — OFFICE VISIT (OUTPATIENT)
Dept: SURGERY | Facility: CLINIC | Age: 66
End: 2021-11-19

## 2021-11-19 DIAGNOSIS — M48.061 LUMBAR STENOSIS WITHOUT NEUROGENIC CLAUDICATION: Primary | ICD-10-CM

## 2021-11-19 DIAGNOSIS — M54.16 LUMBAR RADICULOPATHY: ICD-10-CM

## 2021-11-19 PROCEDURE — 64483 NJX AA&/STRD TFRM EPI L/S 1: CPT | Performed by: PHYSICAL MEDICINE & REHABILITATION

## 2021-11-19 PROCEDURE — 64484 NJX AA&/STRD TFRM EPI L/S EA: CPT | Performed by: PHYSICAL MEDICINE & REHABILITATION

## 2021-11-19 NOTE — PROCEDURES
[facility]    2-LEVEL LUMBAR TRANSFORAMINAL   NAME:  Radha García    MR #:    [id number] :  1955     PHYSICIAN:  Lonnie Piedra DO        Operative Report    DATE OF PROCEDURE: 2021   PREOPERATIVE DIAGNOSES: 1.  Lumb applied. The patient was transferred to the cart and into PAR. The patient was given discharge instructions and will follow up in the clinic as scheduled.   Throughout the whole procedure, the patient's pulse oximetry and vital signs were monitored and th

## 2021-11-23 ENCOUNTER — PATIENT MESSAGE (OUTPATIENT)
Dept: PHYSICAL MEDICINE AND REHAB | Facility: CLINIC | Age: 66
End: 2021-11-23

## 2021-11-24 NOTE — TELEPHONE ENCOUNTER
From: Lexus Mena  To: Ivanna Tucker DO  Sent: 11/23/2021 10:05 AM CST  Subject: Injection ineffective     Hi Dr. Danish Weaver in the same amount of pain as before the shot we need to way other options.

## 2021-11-24 NOTE — TELEPHONE ENCOUNTER
Spoke to patient notified that it can take days for the pain to get better. Patient understand.  Set up a follow up appointment with Dr. Angela Maradiaga,

## 2021-12-07 ENCOUNTER — MED REC SCAN ONLY (OUTPATIENT)
Dept: PHYSICAL MEDICINE AND REHAB | Facility: CLINIC | Age: 66
End: 2021-12-07

## 2021-12-07 ENCOUNTER — IMMUNIZATION (OUTPATIENT)
Dept: LAB | Facility: HOSPITAL | Age: 66
End: 2021-12-07
Attending: EMERGENCY MEDICINE
Payer: MEDICARE

## 2021-12-07 DIAGNOSIS — Z23 NEED FOR VACCINATION: Primary | ICD-10-CM

## 2021-12-07 PROCEDURE — 0064A SARSCOV2 VAC 50MCG/0.25ML IM: CPT

## 2021-12-14 ENCOUNTER — TELEPHONE (OUTPATIENT)
Dept: PHYSICAL MEDICINE AND REHAB | Facility: CLINIC | Age: 66
End: 2021-12-14

## 2021-12-14 ENCOUNTER — OFFICE VISIT (OUTPATIENT)
Dept: PHYSICAL MEDICINE AND REHAB | Facility: CLINIC | Age: 66
End: 2021-12-14
Payer: MEDICARE

## 2021-12-14 VITALS
WEIGHT: 160 LBS | BODY MASS INDEX: 19.48 KG/M2 | HEIGHT: 76 IN | SYSTOLIC BLOOD PRESSURE: 130 MMHG | DIASTOLIC BLOOD PRESSURE: 60 MMHG

## 2021-12-14 DIAGNOSIS — M54.16 LEFT LUMBAR RADICULITIS: Primary | ICD-10-CM

## 2021-12-14 DIAGNOSIS — M48.062 LUMBAR STENOSIS WITH NEUROGENIC CLAUDICATION: Primary | ICD-10-CM

## 2021-12-14 DIAGNOSIS — M48.062 LUMBAR STENOSIS WITH NEUROGENIC CLAUDICATION: ICD-10-CM

## 2021-12-14 PROCEDURE — 99214 OFFICE O/P EST MOD 30 MIN: CPT | Performed by: PHYSICAL MEDICINE & REHABILITATION

## 2021-12-14 PROCEDURE — 3078F DIAST BP <80 MM HG: CPT | Performed by: PHYSICAL MEDICINE & REHABILITATION

## 2021-12-14 PROCEDURE — 3075F SYST BP GE 130 - 139MM HG: CPT | Performed by: PHYSICAL MEDICINE & REHABILITATION

## 2021-12-14 PROCEDURE — 3008F BODY MASS INDEX DOCD: CPT | Performed by: PHYSICAL MEDICINE & REHABILITATION

## 2021-12-14 NOTE — TELEPHONE ENCOUNTER
Initiated authorization for Left paramedian L3-4 interlaminar epidural steroid injection CPT 53272 dx:M54.16 to be done at [facility] with [employer]  Service Order #[id number].     Status: Approved with authorization #[id number] valid 12/14/21-6/12/22

## 2021-12-15 NOTE — TELEPHONE ENCOUNTER
Patient has been scheduled for Left paramedian L3-4 interlaminar epidural steroid injection on 1/24/21 at the [facility] with Dr. Guerrero Alert.    -Anesthesia type: Local.  -If receiving MAC or IVC sedation patient will need to get COVID tested 3 days prior even if al

## 2021-12-15 NOTE — TELEPHONE ENCOUNTER
Patient states there is a medication you were going to prescribe him until his procedure. Please advise.

## 2021-12-16 ENCOUNTER — PATIENT MESSAGE (OUTPATIENT)
Dept: PHYSICAL MEDICINE AND REHAB | Facility: CLINIC | Age: 66
End: 2021-12-16

## 2021-12-16 RX ORDER — ACETAMINOPHEN AND CODEINE PHOSPHATE 300; 30 MG/1; MG/1
1 TABLET ORAL 2 TIMES DAILY PRN
Qty: 30 TABLET | Refills: 0 | Status: SHIPPED | OUTPATIENT
Start: 2021-12-16

## 2021-12-16 NOTE — TELEPHONE ENCOUNTER
From: Rena Diaz  To:  Nolene Saint, DO  Sent: 12/16/2021 12:49 PM CST  Subject: Stronger pain medication until infection     Hi    My injection is scheduled for January 24 you said you would prescribe something stronger if we could not get a da

## 2021-12-17 NOTE — TELEPHONE ENCOUNTER
Please see if he received the prescription for tylenol with codeine. Should have been sent to pharmacy yesterday.

## 2021-12-27 RX ORDER — PANTOPRAZOLE SODIUM 40 MG/1
TABLET, DELAYED RELEASE ORAL
Qty: 90 TABLET | Refills: 3 | OUTPATIENT
Start: 2021-12-27

## 2021-12-27 RX ORDER — LOSARTAN POTASSIUM 100 MG/1
TABLET ORAL
Qty: 90 TABLET | Refills: 3 | OUTPATIENT
Start: 2021-12-27

## 2022-01-21 NOTE — TELEPHONE ENCOUNTER
Received call from [employer] at [facility] stating the patients insurance shows termed. [employer] informed all [employer] [place] plan were given new member ID #s at the beginning of the year and we will need to obtain new member ID # from patient.     Spoke to patient w

## 2022-01-24 ENCOUNTER — OFFICE VISIT (OUTPATIENT)
Dept: SURGERY | Facility: CLINIC | Age: 67
End: 2022-01-24
Payer: MEDICARE

## 2022-01-24 DIAGNOSIS — M54.16 LEFT LUMBAR RADICULOPATHY: ICD-10-CM

## 2022-01-24 PROCEDURE — 62323 NJX INTERLAMINAR LMBR/SAC: CPT | Performed by: PHYSICAL MEDICINE & REHABILITATION

## 2022-01-24 NOTE — PROCEDURES
Procedure Note - L3-4 interlaminar epidural steroid injection, local     Informed Consent Obtained by:  Halle Hendrix DO  Procedure performed by: Halle Hendrix DO    Procedure: LUMBAR EPIDURAL STEROID INJECTION UNDER FLUOROSCOPY OF L3-4    Pre-operative Rima Simons F/U in 2-3 weeks for post-procedure evaluation

## 2022-01-26 ENCOUNTER — TELEPHONE (OUTPATIENT)
Dept: INTERNAL MEDICINE CLINIC | Facility: CLINIC | Age: 67
End: 2022-01-26

## 2022-01-26 NOTE — TELEPHONE ENCOUNTER
atorvastatin 10 MG Oral Tab 90 tablet 1 3/10/2021     Sig - Route: Take 1 tablet (10 mg total) by mouth daily. - Oral    Sent to pharmacy as: Atorvastatin Calcium 10 MG Oral Tablet (LIPITOR)    Notes to Pharmacy: Requesting 1 year supply    E-Prescribing Status: Receipt confirmed by pharmacy (3/10/2021 11:45 AM CST)      Called and left message for patient to clarify. Active med list states atorvastatin says atorvastatin 10mg, however, patient saw Dr. Chris Davis at [facility] last year which says both atorvastatin 10mg and 20mg. Await return call.

## 2022-02-02 NOTE — TELEPHONE ENCOUNTER
Isabella Lemon at [place] who states case was Denied Denial reason CPT Code: 90713  Based on Medicare Local Coverage Determination (LCD): Epidural Steroid Injections for Pain Management ([id number]), we cannot approve this request. Epidural steroid injec

## 2022-02-03 NOTE — TELEPHONE ENCOUNTER
Vicente Clause to start [employer] process.   Anita Raymond confirmed Appeal would need to be done in writing and should be faxed to [phone]    Letter of Medical Necessity and Clinicals faxed to both [phone] and [phone]    Patient notified of

## 2022-02-22 ENCOUNTER — TELEPHONE (OUTPATIENT)
Dept: PHYSICAL MEDICINE AND REHAB | Facility: CLINIC | Age: 67
End: 2022-02-22

## 2022-02-22 ENCOUNTER — OFFICE VISIT (OUTPATIENT)
Dept: PHYSICAL MEDICINE AND REHAB | Facility: CLINIC | Age: 67
End: 2022-02-22
Payer: MEDICARE

## 2022-02-22 VITALS
SYSTOLIC BLOOD PRESSURE: 130 MMHG | WEIGHT: 260 LBS | DIASTOLIC BLOOD PRESSURE: 74 MMHG | HEIGHT: 76 IN | BODY MASS INDEX: 31.66 KG/M2 | OXYGEN SATURATION: 93 % | HEART RATE: 77 BPM

## 2022-02-22 DIAGNOSIS — M48.062 LUMBAR STENOSIS WITH NEUROGENIC CLAUDICATION: Primary | ICD-10-CM

## 2022-02-22 PROCEDURE — 3078F DIAST BP <80 MM HG: CPT | Performed by: PHYSICAL MEDICINE & REHABILITATION

## 2022-02-22 PROCEDURE — 3075F SYST BP GE 130 - 139MM HG: CPT | Performed by: PHYSICAL MEDICINE & REHABILITATION

## 2022-02-22 PROCEDURE — 3008F BODY MASS INDEX DOCD: CPT | Performed by: PHYSICAL MEDICINE & REHABILITATION

## 2022-02-22 PROCEDURE — 99213 OFFICE O/P EST LOW 20 MIN: CPT | Performed by: PHYSICAL MEDICINE & REHABILITATION

## 2022-02-22 NOTE — PROGRESS NOTES
Progress note    C/C: low back pain    HPI: 78year old male presents for follow up. Underwent L3-4 interlaminar epidural steroid injection under fluoroscopy, local on 1/24/2022. Reports 40% improvement. He has now been having increased right leg pain with tingling in the right anterior thigh. Limited in his ability to tolerate walking. He can walk [employer] through a grocery store, and then has to turn and walk back to the car. Awakens at night time due to pain. No improvement with doxepin. Has been limited in movement due to increased pain. Currently taking gabapentin 300mg which does improve his pain; if he forgets to take a dose he has increased low back and leg pain. Pertinent allergies: NKDA    Physical exam:  BMI 31.65  /74  HR 77 O2 sat 93%    Lumbar spine exam:    Mild pain with lumbar flexion. + pain with lumbar extension. + tenderness to palpation lumbar paraspinals. No ttp PSIS. 5/5 LE strength b/l  SILT b/l LE  2/4 quad, gastrocs reflexes b/l  Facet loading negative  Seated slump test +  SLR results in lower back pain, bilaterally    Imaging: no new imaging to review    Assessment and plan  1. Lumbar stenosis  2. L3-4 HNP    Recommend increasing dose of gabapentin by 300mg weekly until he reaches a therapeutic dose, or until he reaches a dose of 600mg TID. If the medication causes him to be overly drowsy or dizzy he will decrease to the previous dose. Acupuncture ordered for chronic spine pain related to lumbar stenosis, L3-4 HNP. Follow up in 2 months, or earlier PRN.     [facility]  Physical Medicine and [address] [place] [address]

## 2022-02-22 NOTE — TELEPHONE ENCOUNTER
Initiated authorization for Acupuncture CPT 38983+70914 with Jossie Punch at [facility] is active  Case #[id number]    Status: Approved-authorization is not required and it is a covered benefit depending on medical necessity    Patient notified via [place]

## 2022-02-22 NOTE — PATIENT INSTRUCTIONS
Increase the gabapentin as follows:     Week 1) 1 capsule in the morning, 1 capsule in the afternoon, 2 capsules at night time. Week 2) 2 capsules in the morning, 1 capsule in the afternoon, 2 capsules in the evening    Week 3) 2 capsules three times daily. If you find a dose that works for you, then you do NOT have to increase the dose. If it causes significant drowsiness or dizziness, go back to your previous dose.

## 2022-03-02 NOTE — TELEPHONE ENCOUNTER
Refill request    Not Listed:    Med:  Protonix Tab 40mg Dr  -Take 1 Tablet every morning before breakfast  Qty: 90    Med: Cozaar Tab 100mg       - Take 1 Tablet Daily                     Qty: 90    Current Outpatient Medications   Medication Sig Dispense Refill

## 2022-03-05 RX ORDER — PANTOPRAZOLE SODIUM 40 MG/1
40 TABLET, DELAYED RELEASE ORAL
Qty: 90 TABLET | Refills: 1 | Status: SHIPPED | OUTPATIENT
Start: 2022-03-05

## 2022-03-05 RX ORDER — LOSARTAN POTASSIUM 100 MG/1
100 TABLET ORAL DAILY
Qty: 90 TABLET | Refills: 1 | Status: SHIPPED | OUTPATIENT
Start: 2022-03-05

## 2022-03-19 ENCOUNTER — PATIENT MESSAGE (OUTPATIENT)
Dept: PHYSICAL MEDICINE AND REHAB | Facility: CLINIC | Age: 67
End: 2022-03-19

## 2022-03-21 NOTE — TELEPHONE ENCOUNTER
From: Umesh Sandoval  To:  Prisca Sarah DO  Sent: 3/19/2022 12:36 PM CDT  Subject: Need authorization on prescription     I need for you to authorize the prescription diclofinac 75mg

## 2022-04-05 ENCOUNTER — TELEPHONE (OUTPATIENT)
Dept: PHYSICAL MEDICINE AND REHAB | Facility: CLINIC | Age: 67
End: 2022-04-05

## 2022-04-05 ENCOUNTER — OFFICE VISIT (OUTPATIENT)
Dept: PHYSICAL MEDICINE AND REHAB | Facility: CLINIC | Age: 67
End: 2022-04-05
Payer: MEDICARE

## 2022-04-05 VITALS
HEIGHT: 76 IN | DIASTOLIC BLOOD PRESSURE: 74 MMHG | BODY MASS INDEX: 31.05 KG/M2 | OXYGEN SATURATION: 94 % | WEIGHT: 255 LBS | HEART RATE: 81 BPM | SYSTOLIC BLOOD PRESSURE: 140 MMHG

## 2022-04-05 DIAGNOSIS — M48.062 LUMBAR STENOSIS WITH NEUROGENIC CLAUDICATION: Primary | ICD-10-CM

## 2022-04-05 PROCEDURE — 3078F DIAST BP <80 MM HG: CPT | Performed by: PHYSICAL MEDICINE & REHABILITATION

## 2022-04-05 PROCEDURE — 99214 OFFICE O/P EST MOD 30 MIN: CPT | Performed by: PHYSICAL MEDICINE & REHABILITATION

## 2022-04-05 PROCEDURE — 3077F SYST BP >= 140 MM HG: CPT | Performed by: PHYSICAL MEDICINE & REHABILITATION

## 2022-04-05 PROCEDURE — 3008F BODY MASS INDEX DOCD: CPT | Performed by: PHYSICAL MEDICINE & REHABILITATION

## 2022-04-05 RX ORDER — GABAPENTIN 300 MG/1
600 CAPSULE ORAL 3 TIMES DAILY
Qty: 360 CAPSULE | Refills: 2 | Status: SHIPPED | OUTPATIENT
Start: 2022-04-05

## 2022-04-05 NOTE — TELEPHONE ENCOUNTER
Initiated authorization for L-Spine MRI w+wo CPT 51319  to be done at [address] [place] [address] with [address] online  Case #[id number].   [employer] requested clinicals-clinicals will be faxed to [phone] once note is completed  Status: pending

## 2022-04-06 NOTE — PROGRESS NOTES
Progress note    C/C: back and leg pain    HPI: 70-year-old male presents for follow-up. Since the last time I saw him he increase the gabapentin to 600 mg 3 times daily without side effects. In general, increased medication has been helping so the past few weeks, when she has begun having increasing bilateral lower back pain above the waistline that radiates down the posterior aspect of both legs down the feet that limits his ability to walk from more than 15 to 20 minutes at a time before needing to stop. Patient-reported ROS  Constitutional  Sleep Disturbance: denies   Cardiovascular  Chest Pain: denies  Irregular Heartbeat: denies   Gastrointestinal  Bowel Incontinence: denies  Heartburn: denies   Genitourinary  Difficulty Urinating: denies  Bladder Incontinence: denies   Musculoskeletal  Joint Stiffness: admits  Painful Joints: admits   Neurological  Loss of Strength Since last Visit: denies  Tingling/Numbness: admits     Pertinent allergies: No known drug allergies    Physical exam:  BMI 31.04. Blood pressure 140/74. Pulse 81. O2 sat 94%    Tenderness to palpation bilateral lower lumbar paraspinals  5/5 lower extremity strength bilaterally; he is able to both heel and toe walk without difficulty, able to arise from a seated position without pushing off with arms easily  2/4 lower extremity reflexes bilaterally    Imaging: No new imaging to review    Assessment and plan  1. Lumbar stenosis with neurogenic claudication  2. H/o prostate CA    Increasing symptoms despite the increased use of gabapentin to 600 mg 3 times daily. Recommend repeating MRI of the lumbar spine w/wo contrast; if no new findings consider repeating bilateral L4 transforaminal epidural steroid injection under fluoroscopy, local.  The patient is aware the risks of the procedure, including bleeding, infection, nerve injury, headaches. Further instructions pending review of imaging.     Levon Staples DO  Physical Medicine and [address] [place] [address]

## 2022-04-07 RX ORDER — DICLOFENAC SODIUM 75 MG/1
75 TABLET, DELAYED RELEASE ORAL 2 TIMES DAILY
Qty: 180 TABLET | Refills: 0 | Status: SHIPPED | OUTPATIENT
Start: 2022-04-07

## 2022-04-08 ENCOUNTER — OFFICE VISIT (OUTPATIENT)
Dept: FAMILY MEDICINE CLINIC | Facility: CLINIC | Age: 67
End: 2022-04-08
Payer: MEDICARE

## 2022-04-08 VITALS
WEIGHT: 254 LBS | SYSTOLIC BLOOD PRESSURE: 139 MMHG | DIASTOLIC BLOOD PRESSURE: 82 MMHG | HEIGHT: 76 IN | BODY MASS INDEX: 30.93 KG/M2 | HEART RATE: 75 BPM

## 2022-04-08 DIAGNOSIS — J43.8 OTHER EMPHYSEMA (HCC): ICD-10-CM

## 2022-04-08 DIAGNOSIS — E11.65 TYPE 2 DIABETES MELLITUS WITH HYPERGLYCEMIA, WITHOUT LONG-TERM CURRENT USE OF INSULIN (HCC): ICD-10-CM

## 2022-04-08 DIAGNOSIS — R91.1 PULMONARY NODULE: Primary | ICD-10-CM

## 2022-04-08 DIAGNOSIS — C61 PROSTATE CANCER (HCC): ICD-10-CM

## 2022-04-08 DIAGNOSIS — M48.00 SPINAL STENOSIS, UNSPECIFIED SPINAL REGION: ICD-10-CM

## 2022-04-08 PROCEDURE — 3079F DIAST BP 80-89 MM HG: CPT | Performed by: FAMILY MEDICINE

## 2022-04-08 PROCEDURE — 99203 OFFICE O/P NEW LOW 30 MIN: CPT | Performed by: FAMILY MEDICINE

## 2022-04-08 PROCEDURE — 3008F BODY MASS INDEX DOCD: CPT | Performed by: FAMILY MEDICINE

## 2022-04-08 PROCEDURE — 3075F SYST BP GE 130 - 139MM HG: CPT | Performed by: FAMILY MEDICINE

## 2022-04-08 RX ORDER — ATORVASTATIN CALCIUM 20 MG/1
20 TABLET, FILM COATED ORAL DAILY
COMMUNITY
Start: 2022-04-07

## 2022-04-11 RX ORDER — DICLOFENAC SODIUM 75 MG/1
75 TABLET, DELAYED RELEASE ORAL 2 TIMES DAILY
Qty: 180 TABLET | Refills: 0 | Status: CANCELLED | OUTPATIENT
Start: 2022-04-11

## 2022-04-12 ENCOUNTER — HOSPITAL ENCOUNTER (OUTPATIENT)
Dept: MRI IMAGING | Facility: HOSPITAL | Age: 67
Discharge: HOME | End: 2022-04-12
Attending: PHYSICAL MEDICINE & REHABILITATION
Payer: MEDICARE

## 2022-04-12 DIAGNOSIS — M48.062 LUMBAR STENOSIS WITH NEUROGENIC CLAUDICATION: ICD-10-CM

## 2022-04-12 LAB — CREAT BLD-MCNC: 0.7 MG/DL

## 2022-04-12 PROCEDURE — 82565 ASSAY OF CREATININE: CPT

## 2022-04-12 PROCEDURE — 72158 MRI LUMBAR SPINE W/O & W/DYE: CPT | Performed by: PHYSICAL MEDICINE & REHABILITATION

## 2022-04-12 PROCEDURE — A9575 INJ GADOTERATE MEGLUMI 0.1ML: HCPCS | Performed by: PHYSICAL MEDICINE & REHABILITATION

## 2022-04-12 NOTE — TELEPHONE ENCOUNTER
L-Spine MRI w+wo Approved with authorization #[id number] valid 4/12/22-10/9/22    Patient had imaging done today

## 2022-04-14 ENCOUNTER — TELEPHONE (OUTPATIENT)
Dept: NEUROLOGY | Facility: CLINIC | Age: 67
End: 2022-04-14

## 2022-04-14 ENCOUNTER — TELEPHONE (OUTPATIENT)
Dept: PHYSICAL MEDICINE AND REHAB | Facility: CLINIC | Age: 67
End: 2022-04-14

## 2022-04-14 NOTE — TELEPHONE ENCOUNTER
Yes; MRI reviewed, recommended repeat bilateral L4 TFESI under fluoroscopy. Order signed. MRI reviewed 4/12, note below:    MRI lumbar spine reviewed, compared to his previous imaging. The narrowing has somewhat worsened at one particular level of the lower back, the L3-4 level. Recommend repeating bilateral L4 TFESI.

## 2022-04-14 NOTE — TELEPHONE ENCOUNTER
Message below noted. Once injection is authorized by insurance will reach out to schedule patient for 4/25/22.

## 2022-04-14 NOTE — TELEPHONE ENCOUNTER
Per Matthew Guy at [address] [place] [address] 4/5/22: \"Increasing symptoms despite the increased use of gabapentin to 600 mg 3 times daily. Recommend repeating MRI of the lumbar spine w/wo contrast; if no new findings consider repeating bilateral L4 transforaminal epidural steroid injection under fluoroscopy, local.  The patient is aware the risks of the procedure, including bleeding, infection, nerve injury, headaches. Patient completed lumbar MRI on 4/12/22. Spoke with patient who wanted to know if the injection was still going to be scheduled for 4/25/22. Informed patient Matthew Guy will need to review the MRI first to make sure there is no change to the plan. Patient understood. Injection spot blocked for 4/25/22. Injection order pended. Message forwarded to Matthew Guy to review MRI and advise on injection.

## 2022-04-14 NOTE — TELEPHONE ENCOUNTER
[employer] for authorization of approval for Bilateral L4 TFESI cpt codes [id number], [id number]. Uploaded clinical notes.  Service Order: [id number]   pending approval.

## 2022-04-18 ENCOUNTER — TELEPHONE (OUTPATIENT)
Dept: CASE MANAGEMENT | Age: 67
End: 2022-04-18

## 2022-04-18 NOTE — TELEPHONE ENCOUNTER
Enrique Raygoza,    The request for CT of the chest (49460) has been denied by the health plan. You can do a peer to peer  Ph. [phone]  Case # [id number]    Please advise next steps in plan of care. Patient is scheduled for 4.20.22. If you do not plan on doing a peer to peer. Please advise patient to cancel appointment.     Thank you,  [facility]   Referral specialist

## 2022-04-21 ENCOUNTER — OFFICE VISIT (OUTPATIENT)
Dept: FAMILY MEDICINE CLINIC | Facility: CLINIC | Age: 67
End: 2022-04-21
Payer: MEDICARE

## 2022-04-21 VITALS
DIASTOLIC BLOOD PRESSURE: 87 MMHG | HEIGHT: 76 IN | HEART RATE: 80 BPM | BODY MASS INDEX: 30.93 KG/M2 | SYSTOLIC BLOOD PRESSURE: 139 MMHG | WEIGHT: 254 LBS

## 2022-04-21 DIAGNOSIS — M54.59 OTHER LOW BACK PAIN: Primary | ICD-10-CM

## 2022-04-21 PROCEDURE — 97811 ACUP 1/> W/O ESTIM EA ADD 15: CPT | Performed by: FAMILY MEDICINE

## 2022-04-21 PROCEDURE — 3079F DIAST BP 80-89 MM HG: CPT | Performed by: FAMILY MEDICINE

## 2022-04-21 PROCEDURE — 97810 ACUP 1/> WO ESTIM 1ST 15 MIN: CPT | Performed by: FAMILY MEDICINE

## 2022-04-21 PROCEDURE — 3075F SYST BP GE 130 - 139MM HG: CPT | Performed by: FAMILY MEDICINE

## 2022-04-21 PROCEDURE — 3008F BODY MASS INDEX DOCD: CPT | Performed by: FAMILY MEDICINE

## 2022-04-21 PROCEDURE — 99213 OFFICE O/P EST LOW 20 MIN: CPT | Performed by: FAMILY MEDICINE

## 2022-04-21 NOTE — PROCEDURES
Patient tolerated procedure well in excess of 30 minutes was spent with patient   There was no complications all needles were removed.    Patient was advised to rest today and f/u in 1-2 weeks  sp12   Ear points ihicon

## 2022-04-25 ENCOUNTER — OFFICE VISIT (OUTPATIENT)
Dept: SURGERY | Facility: CLINIC | Age: 67
End: 2022-04-25

## 2022-04-25 ENCOUNTER — TELEPHONE (OUTPATIENT)
Dept: PHYSICAL MEDICINE AND REHAB | Facility: CLINIC | Age: 67
End: 2022-04-25

## 2022-04-25 DIAGNOSIS — M48.062 LUMBAR STENOSIS WITH NEUROGENIC CLAUDICATION: Primary | ICD-10-CM

## 2022-04-25 RX ORDER — TIZANIDINE 2 MG/1
2 TABLET ORAL NIGHTLY PRN
Qty: 90 TABLET | Refills: 0 | Status: SHIPPED | OUTPATIENT
Start: 2022-04-25

## 2022-04-25 RX ORDER — DICLOFENAC SODIUM 75 MG/1
75 TABLET, DELAYED RELEASE ORAL 2 TIMES DAILY PRN
Qty: 180 TABLET | Refills: 0 | Status: SHIPPED | OUTPATIENT
Start: 2022-04-25

## 2022-04-25 NOTE — TELEPHONE ENCOUNTER
Per Dr. Hankins Media: \"Patient asking about diclofenac and tizanidine; diclofenac apparently there's an issue with authorization, or it needs to be sent to his mail order pharmacy, not sure. Could one of you check to see if there are any issues with the diclofenac in his record, and when the last time the tizanidine was prescribed? I will address this in the afternoon. Thank you. \"      Last Tizanidine prescribed: 5/2/2021 by Dr. Beverly Viramontes    Diclofenac last dispensed 4/7/22 #180 for 90 day supply at [facility] in [facility] [place] per [place] . S/W patient to clarify Diclofenac order/questions. Patient's preferred pharmacy is [facility]/mail order and the last diclofenac was dispensed to [facility] in [place] - patient states he did not pick it up. S/w [facility] in [place] to confirm he did not pick it up and it is on hold at this time. Will speak with Dr. Hankins Media about new information.

## 2022-04-25 NOTE — PROCEDURES
[facility]    BILATERAL LUMBAR TRANSFORAMINAL   NAME:  Amanda Fountain. MR #:    [id number] :  1955     PHYSICIAN:  Vonnie Hanson DO        Operative Report    DATE OF PROCEDURE: 2022   PREOPERATIVE DIAGNOSES: 1. Lumbar stenosis with neurogenic claudication        POSTOPERATIVE DIAGNOSES:   1. Lumbar stenosis with neurogenic claudication        PROCEDURES: Bilateral L4 transforaminal epidural steroid injections done under fluoroscopic guidance with contrast enhancement. SURGEON: Vonnie Hanson DO   ANESTHESIA: Local   INDICATIONS: Bilateral lower back and leg pain, worse with walking. OPERATIVE PROCEDURE:  Written consent was obtained from the patient. The patient was brought into the operating room and placed in the prone position on the fluoroscopy table with pillow underneath her abdomen. The patient's skin was cleaned and draped in a normal sterile fashion. Using AP fluoroscopy, all five lumbar vertebrae were identified. When the fourth vertebra was identified, fluoroscopy was left anterior obliqued opening up the right L4-5 intervertebral foramen. At this point in time, the patient's skin was anesthetized with 1% PF lidocaine without epinephrine. Then, a 5 inch, 22-gauge spinal needle was inserted and directed towards the right L4-5 intervertebral foramen. When it felt to be in good position, AP fluoroscopy was used to advance the needle to the 6 o'clock position on the right L4 pedicle. At this point in time, Omnipaque-240 contrast was used to obtain a good epidurogram indicating correct needle placement. Then, aspiration was performed. No blood, fluid, or air was aspirated. Then, the patient was injected with a 3 cc solution of 1.5 cc of 6 mg/cc of celestone and 1.5 cc of 1% PF lidocaine without epinephrine. After this, the needle was removed. Then  fluoroscopy was right anterior obliqued opening up the left L4-5 intervertebral foramen.   At this point in time, the patient's skin was anesthetized with 1 to 2 cc of 1% PF lidocaine without epinephrine. Then, a 5 inch, 22-gauge spinal needle was inserted and directed towards the left L4-5 intervertebral foramen. When it felt to be in good position, AP fluoroscopy was used to advance the needle to the 6 o'clock position on the left L4 pedicle. At this point in time, Omnipaque-240 contrast was used to obtain a good epidurogram indicating correct needle placement. Then, aspiration was performed. No blood, fluid, or air was aspirated. Then, the patient was injected with a 3 cc solution of 1.5 cc of 6 mg/cc of celestone and 1.5 cc of 1% PF lidocaine without epinephrine. After this, the needle was removed. The patient's skin was cleaned. Band-Aids were applied. The patient was transferred to the cart and into [facility]. The patient was given discharge instructions and will follow up in the clinic as scheduled. Throughout the whole procedure, the patient's pulse oximetry and vital signs were monitored and they remained completely stable. Also, throughout the whole procedure, prior to injection of any medication, aspiration was performed. No blood, fluid, or air was aspirated at anytime.     Kyra Orlandoian DO  Physical Medicine and [address] [place] [address]

## 2022-04-28 ENCOUNTER — OFFICE VISIT (OUTPATIENT)
Dept: FAMILY MEDICINE CLINIC | Facility: CLINIC | Age: 67
End: 2022-04-28
Payer: MEDICARE

## 2022-04-28 VITALS — DIASTOLIC BLOOD PRESSURE: 86 MMHG | HEART RATE: 83 BPM | SYSTOLIC BLOOD PRESSURE: 143 MMHG

## 2022-04-28 DIAGNOSIS — M54.59 OTHER LOW BACK PAIN: Primary | ICD-10-CM

## 2022-04-28 PROCEDURE — 97811 ACUP 1/> W/O ESTIM EA ADD 15: CPT | Performed by: FAMILY MEDICINE

## 2022-04-28 PROCEDURE — 3079F DIAST BP 80-89 MM HG: CPT | Performed by: FAMILY MEDICINE

## 2022-04-28 PROCEDURE — 3077F SYST BP >= 140 MM HG: CPT | Performed by: FAMILY MEDICINE

## 2022-04-28 PROCEDURE — 99212 OFFICE O/P EST SF 10 MIN: CPT | Performed by: FAMILY MEDICINE

## 2022-04-28 PROCEDURE — 97810 ACUP 1/> WO ESTIM 1ST 15 MIN: CPT | Performed by: FAMILY MEDICINE

## 2022-04-29 ENCOUNTER — OFFICE VISIT (OUTPATIENT)
Dept: FAMILY MEDICINE CLINIC | Facility: CLINIC | Age: 67
End: 2022-04-29
Payer: MEDICARE

## 2022-04-29 VITALS
HEIGHT: 76 IN | WEIGHT: 251 LBS | SYSTOLIC BLOOD PRESSURE: 142 MMHG | BODY MASS INDEX: 30.56 KG/M2 | DIASTOLIC BLOOD PRESSURE: 80 MMHG | HEART RATE: 72 BPM

## 2022-04-29 DIAGNOSIS — E11.9 TYPE 2 DIABETES MELLITUS WITHOUT RETINOPATHY (HCC): ICD-10-CM

## 2022-04-29 DIAGNOSIS — E55.9 VITAMIN D DEFICIENCY: Primary | ICD-10-CM

## 2022-04-29 DIAGNOSIS — J43.8 OTHER EMPHYSEMA (HCC): ICD-10-CM

## 2022-04-29 DIAGNOSIS — M79.673 PAIN OF FOOT, UNSPECIFIED LATERALITY: ICD-10-CM

## 2022-04-29 DIAGNOSIS — M54.16 LUMBAR RADICULOPATHY: ICD-10-CM

## 2022-04-29 DIAGNOSIS — E11.65 TYPE 2 DIABETES MELLITUS WITH HYPERGLYCEMIA, WITHOUT LONG-TERM CURRENT USE OF INSULIN (HCC): ICD-10-CM

## 2022-04-29 DIAGNOSIS — Z12.2 SCREENING FOR LUNG CANCER: ICD-10-CM

## 2022-04-29 DIAGNOSIS — Z00.00 ENCOUNTER FOR ANNUAL HEALTH EXAMINATION: ICD-10-CM

## 2022-04-29 DIAGNOSIS — E78.1 PURE HYPERTRIGLYCERIDEMIA: ICD-10-CM

## 2022-04-29 DIAGNOSIS — R91.1 PULMONARY NODULE: ICD-10-CM

## 2022-04-29 DIAGNOSIS — C61 PROSTATE CANCER (HCC): ICD-10-CM

## 2022-04-29 DIAGNOSIS — D12.6 TUBULAR ADENOMA OF COLON: ICD-10-CM

## 2022-04-29 DIAGNOSIS — Z83.511 FAMILY HISTORY OF GLAUCOMA IN MATERNAL GRANDMOTHER: ICD-10-CM

## 2022-04-29 DIAGNOSIS — I10 ESSENTIAL HYPERTENSION: ICD-10-CM

## 2022-04-29 DIAGNOSIS — D69.6 THROMBOCYTOPENIA (HCC): Chronic | ICD-10-CM

## 2022-04-29 NOTE — PROCEDURES
Patient tolerated procedure well in excess of 30 minutes was spent with patient   There was no complications all needles were removed.    Patient was advised to rest today and f/u in 1-2 weeks  gb and bl tm treatment bilaterally   Ear points

## 2022-05-03 ENCOUNTER — OFFICE VISIT (OUTPATIENT)
Dept: FAMILY MEDICINE CLINIC | Facility: CLINIC | Age: 67
End: 2022-05-03
Payer: MEDICARE

## 2022-05-03 ENCOUNTER — TELEPHONE (OUTPATIENT)
Dept: FAMILY MEDICINE CLINIC | Facility: CLINIC | Age: 67
End: 2022-05-03

## 2022-05-03 VITALS — DIASTOLIC BLOOD PRESSURE: 82 MMHG | HEART RATE: 66 BPM | SYSTOLIC BLOOD PRESSURE: 138 MMHG

## 2022-05-03 DIAGNOSIS — E66.2 HYPOVENTILATION ASSOCIATED WITH OBESITY SYNDROME (HCC): ICD-10-CM

## 2022-05-03 DIAGNOSIS — M54.59 OTHER LOW BACK PAIN: Primary | ICD-10-CM

## 2022-05-03 NOTE — TELEPHONE ENCOUNTER
Dr Amanda Castillo[facility]. Pharmacist states Hydrocortisone suppository come in pack of 24. RX sent 4/29 was for BID for 14 days  (would be for 28)      Please advise if instructions can be changed to reflect Qty to dispense. Can send new RX with changes.

## 2022-05-09 ENCOUNTER — TELEPHONE (OUTPATIENT)
Dept: FAMILY MEDICINE CLINIC | Facility: CLINIC | Age: 67
End: 2022-05-09

## 2022-05-09 NOTE — TELEPHONE ENCOUNTER
Hi, Dr. Vaughn Prime    Patients insurance plan will not authorized this referral they are requesting additional information or can schedule a peer to peer discussion. Letter is below. Thank you   [facility]  [address] [address] [address] [facility]  [id number]    Date: 5/9/2022 CONFIDENTIAL   [facility]  [address]  [place]  Fax: [phone] From: [facility]  Phone: [phone]  Fax: [phone]    Urgent Action Needed: Information needed in order to approve request for member   Keerthi Chaney. Respond Before 5/12/2022  This notice is not a denial of coverage notice. This notice is an opportunity to provide   additional information, not previously provided, or to engage in a Snfk-nf-Lpil discussion,   prior to issuing a denial decision. [address] [address] License [facility]  [id number]  Urgent Action Needed: Information needed in order to approve request for member   Keerthi Chaney. Respond Before 5/12/2022. This notice is not a denial of coverage notice. This notice is an opportunity to provide   additional information, not previously provided, or to engage in a Coos-mj-Gwbv discussion,   prior to issuing a denial decision. Date: 5/9/2022 Member number: [id number]  Beneficiary's name: Keerthi Chaney  This fax is to inform you that our Medical Director has reviewed your request for Su Efren, procedure code  Procedure Description Units   Requested  45228 Computed tomography (CT), a special kind of picture of   your chest to screen for lung cancer, without contrast (dye)  1  If we do not receive additional information, or a request for a Dgor-bf-Durn discussion, to   support an approval by the date specified, the information below will be used in making a   determination.  This information contains the reason why the request does not currently   meet medical necessity, and the information needed to support an approval:  Based on Medicare National Coverage Determinations (NCD): 210.14 Lung Cancer   Screening with Low Dose Computed Tomography (LDCT), we cannot approve this   request. Your records show that your doctor would like to screen for disease in your   lungs. The request cannot be approved because: You must show that you have a 30 pack-year smoking history. These are measured by   multiplying the number of packs smoked per day by the number of years you smoked. Please provide the requested clinical information Before 5/12/2022. Please call [phone],   select the prompt associated with the request type and enter in reference number [id number] to   speak with a clinician about this request. You may fax additional clinical information to support   this request to [phone] for review. You may also schedule a Ufkt-fd-Qhai discussion regarding the case. You may schedule a   discussion with a Medical Director by calling [phone], select the prompt associated with   the request type and enter in reference number [id number]. You also have the opportunity to   schedule a Peer to Peer discussion online at [email or web address]. Search for the   case by using the Authorization Lookup tab, and then go to [facility] Availability, to schedule the   discussion. NOTE: Once an adverse determination letter has been issued, the physician who made the   adverse determination cannot reverse the original decision based upon a Pre-Determination   Consult. Instead, providers will have to appeal the original decision in accordance with the   appeal instructions set forth in the adverse determination letter.   Sincerely,  ZEB Barboza

## 2022-06-04 DIAGNOSIS — M48.062 LUMBAR STENOSIS WITH NEUROGENIC CLAUDICATION: ICD-10-CM

## 2022-06-05 NOTE — PROCEDURES
Patient tolerated procedure well in excess of 30 minutes was spent with patient   There was no complications all needles were removed.    Patient was advised to rest today and f/u in 1-2 weeks  Sp 12, chris points bl 40 ear points

## 2022-06-06 RX ORDER — TIZANIDINE 2 MG/1
TABLET ORAL
Qty: 90 TABLET | Refills: 0 | OUTPATIENT
Start: 2022-06-06

## 2022-06-06 RX ORDER — DICLOFENAC SODIUM 75 MG/1
TABLET, DELAYED RELEASE ORAL
Qty: 180 TABLET | Refills: 0 | OUTPATIENT
Start: 2022-06-06

## 2022-06-06 RX ORDER — GABAPENTIN 300 MG/1
CAPSULE ORAL
Qty: 270 CAPSULE | Refills: 2 | OUTPATIENT
Start: 2022-06-06

## 2022-06-06 NOTE — TELEPHONE ENCOUNTER
Called pharmacy and they state there is a refill ready for refill on July 2nd. Patient should have enough medication to last till July 2nd. Left a message for patient notifying that there is a refill for July 2nd (earliest refill date per pharmacist). Decline refill of Gabapentin, Tizanidine and Diclofenac, because there is still one refill for 90 days available for July 2nd.

## 2022-06-15 ENCOUNTER — PATIENT MESSAGE (OUTPATIENT)
Dept: FAMILY MEDICINE CLINIC | Facility: CLINIC | Age: 67
End: 2022-06-15

## 2022-06-15 DIAGNOSIS — E11.65 TYPE 2 DIABETES MELLITUS WITH HYPERGLYCEMIA, WITHOUT LONG-TERM CURRENT USE OF INSULIN (HCC): ICD-10-CM

## 2022-06-16 NOTE — TELEPHONE ENCOUNTER
From: Johnson Campa.   To: Katelyn Glover MD  Sent: 6/15/2022 3:31 PM CDT  Subject: Clair Jensen refjose j [address].  I need a refill on my 90 day Jardiance prescription  Please send to [facility] in

## 2022-06-17 DIAGNOSIS — M48.062 LUMBAR STENOSIS WITH NEUROGENIC CLAUDICATION: ICD-10-CM

## 2022-06-20 RX ORDER — DICLOFENAC SODIUM 75 MG/1
TABLET, DELAYED RELEASE ORAL
Qty: 180 TABLET | Refills: 0 | Status: SHIPPED | OUTPATIENT
Start: 2022-06-20

## 2022-06-20 RX ORDER — TIZANIDINE 2 MG/1
TABLET ORAL
Qty: 90 TABLET | Refills: 0 | Status: SHIPPED | OUTPATIENT
Start: 2022-06-20

## 2022-06-20 NOTE — TELEPHONE ENCOUNTER
Refill Request    Medication request: tiZANidine 2 MG Oral Tab    LOV:4/25/2022 [facility] Kori Seo DO   Due back to clinic per last office note:  Post injection  NOV: Visit date not found      [place]: 4/25/22 #90    Urine drug screen (if applicable): None  Pain contract: Non    LOV plan (if weaning or changing medications): N/A    Medication request: diclofenac 75 MG Oral Tab EC  1 tab BID  Qty#180 R-0  4/25/22

## 2022-06-28 ENCOUNTER — PATIENT MESSAGE (OUTPATIENT)
Dept: FAMILY MEDICINE CLINIC | Facility: CLINIC | Age: 67
End: 2022-06-28

## 2022-06-28 NOTE — TELEPHONE ENCOUNTER
From: Jean Pierre Payton. To: Jayme Cheatham MD  Sent: 6/28/2022 3:22 PM CDT  Subject: [facility] rx    Hi doctor   I am attaching a referral form for [facility] rx. I completed the previous 8 week course. Last time your referral was for cognitive health. This time they are requesting you choose a different one for my next 8 week course.  Thank you Estrellita Jones

## 2022-07-08 ENCOUNTER — MED REC SCAN ONLY (OUTPATIENT)
Dept: FAMILY MEDICINE CLINIC | Facility: CLINIC | Age: 67
End: 2022-07-08

## 2022-07-08 NOTE — TELEPHONE ENCOUNTER
Printed out the form. Placed on dr Hu Lever desk to sign. Called pt to notify that form is signed. Form placed at the [facility] clinic   for a . Called pt to notify. He verbalized understanding. Made copy, placed in scanning.

## 2022-07-12 ENCOUNTER — OFFICE VISIT (OUTPATIENT)
Dept: PHYSICAL MEDICINE AND REHAB | Facility: CLINIC | Age: 67
End: 2022-07-12
Payer: MEDICARE

## 2022-07-12 VITALS
OXYGEN SATURATION: 97 % | HEART RATE: 76 BPM | HEIGHT: 76 IN | BODY MASS INDEX: 30.44 KG/M2 | DIASTOLIC BLOOD PRESSURE: 68 MMHG | WEIGHT: 250 LBS | SYSTOLIC BLOOD PRESSURE: 140 MMHG

## 2022-07-12 DIAGNOSIS — M48.062 LUMBAR STENOSIS WITH NEUROGENIC CLAUDICATION: Primary | ICD-10-CM

## 2022-07-12 PROCEDURE — 99214 OFFICE O/P EST MOD 30 MIN: CPT | Performed by: PHYSICAL MEDICINE & REHABILITATION

## 2022-07-12 PROCEDURE — 3077F SYST BP >= 140 MM HG: CPT | Performed by: PHYSICAL MEDICINE & REHABILITATION

## 2022-07-12 PROCEDURE — 3078F DIAST BP <80 MM HG: CPT | Performed by: PHYSICAL MEDICINE & REHABILITATION

## 2022-07-12 PROCEDURE — 3008F BODY MASS INDEX DOCD: CPT | Performed by: PHYSICAL MEDICINE & REHABILITATION

## 2022-07-12 RX ORDER — DULOXETIN HYDROCHLORIDE 30 MG/1
30 CAPSULE, DELAYED RELEASE ORAL DAILY
Qty: 90 CAPSULE | Refills: 0 | Status: SHIPPED | OUTPATIENT
Start: 2022-07-12

## 2022-07-12 NOTE — PATIENT INSTRUCTIONS
If you have no side effects and seemingly no benefit continue the medication for at least 6 weeks. Please contact me in 1 month through [facility] and let me know how you are doing. If no side effects and no benefit I may instruct you to increase the dose.

## 2022-07-29 ENCOUNTER — LAB ENCOUNTER (OUTPATIENT)
Dept: LAB | Facility: HOSPITAL | Age: 67
End: 2022-07-29
Attending: UROLOGY
Payer: MEDICARE

## 2022-07-29 DIAGNOSIS — N52.9 ERECTILE DYSFUNCTION, UNSPECIFIED ERECTILE DYSFUNCTION TYPE: ICD-10-CM

## 2022-07-29 LAB — PSA SERPL-MCNC: <0.01 NG/ML (ref ?–4)

## 2022-07-29 PROCEDURE — 84153 ASSAY OF PSA TOTAL: CPT

## 2022-07-29 PROCEDURE — 36415 COLL VENOUS BLD VENIPUNCTURE: CPT

## 2022-08-03 ENCOUNTER — PATIENT MESSAGE (OUTPATIENT)
Dept: PHYSICAL MEDICINE AND REHAB | Facility: CLINIC | Age: 67
End: 2022-08-03

## 2022-08-03 DIAGNOSIS — M48.062 LUMBAR STENOSIS WITH NEUROGENIC CLAUDICATION: ICD-10-CM

## 2022-08-04 NOTE — TELEPHONE ENCOUNTER
From: Jazmine Mason. To: Naya Vega DO  Sent: 8/3/2022 7:53 PM CDT  Subject: Update    Hi Dr Kari Roy,    I'm tolerating the doluxatine very well I've noticed a 50% reduction in pain I could probably tolerate a higher dosages.

## 2022-08-08 RX ORDER — TIZANIDINE 2 MG/1
TABLET ORAL
Qty: 90 TABLET | Refills: 0 | Status: SHIPPED | OUTPATIENT
Start: 2022-08-08 | End: 2022-08-30

## 2022-08-08 RX ORDER — DICLOFENAC SODIUM 75 MG/1
TABLET, DELAYED RELEASE ORAL
Qty: 180 TABLET | Refills: 0 | Status: SHIPPED | OUTPATIENT
Start: 2022-08-08 | End: 2022-08-30

## 2022-08-08 RX ORDER — DULOXETIN HYDROCHLORIDE 30 MG/1
60 CAPSULE, DELAYED RELEASE ORAL DAILY
Qty: 180 CAPSULE | Refills: 0 | Status: SHIPPED | OUTPATIENT
Start: 2022-08-08 | End: 2022-08-30

## 2022-08-08 NOTE — TELEPHONE ENCOUNTER
Refill Request    Medication request: DULoxetine 30 MG Oral Cap DR Particles    LOV:7/12/2022 Jared King,    Due back to clinic per last office note:  4 weeks  NOV: Visit date not found      [place] ref[place]: 7/12/22 #90    Urine drug screen (if applicable): None  Pain contract: None    LOV plan (if weaning or changing medications): If you have no side effects and seemingly no benefit continue the medication for at least 6 weeks. Medication request: DICLOFENAC 75 MG Oral Tab EC    Qty#180 6/20/22    Medication request: TIZANIDINE 2 MG Oral Tab    Qty#90 6/20/22    Condition update:    Patient sent a [employer] message stating 50% improvement with subsided pain, possibly could tolerate higher dosage. Newest medication would be Duloxetine.

## 2022-08-09 NOTE — TELEPHONE ENCOUNTER
Noted; please have him increase the duloxetine to 60mg. If it's too much for him he can go back to the 30mg dosing.

## 2022-08-28 DIAGNOSIS — M48.062 LUMBAR STENOSIS WITH NEUROGENIC CLAUDICATION: ICD-10-CM

## 2022-08-30 RX ORDER — GABAPENTIN 300 MG/1
CAPSULE ORAL
Qty: 360 CAPSULE | Refills: 2 | Status: SHIPPED | OUTPATIENT
Start: 2022-08-30 | End: 2023-03-10

## 2022-08-30 RX ORDER — DICLOFENAC SODIUM 75 MG/1
TABLET, DELAYED RELEASE ORAL
Qty: 180 TABLET | Refills: 0 | Status: SHIPPED | OUTPATIENT
Start: 2022-08-30 | End: 2022-11-15

## 2022-08-30 RX ORDER — TIZANIDINE 2 MG/1
TABLET ORAL
Qty: 90 TABLET | Refills: 0 | Status: SHIPPED | OUTPATIENT
Start: 2022-08-30 | End: 2022-11-15

## 2022-08-30 RX ORDER — DULOXETIN HYDROCHLORIDE 60 MG/1
60 CAPSULE, DELAYED RELEASE ORAL DAILY
Qty: 180 CAPSULE | Refills: 2 | Status: SHIPPED | OUTPATIENT
Start: 2022-08-30 | End: 2023-03-15

## 2022-08-30 NOTE — TELEPHONE ENCOUNTER
Refill Request    Medication request: TIZANIDINE 2 MG Oral Tab    LOV:7/12/2022 Cameron Wray,    Due back to clinic per last office note:  6M   NOV: Visit date not found      [place] ref[place]: 8/8/22 #90    Urine drug screen (if applicable): None  Pain contract: None    Medication request: DULoxetine 30 MG Oral Cap DR Particles  8/8/22  Qty#180 R-0    Medication request: DICLOFENAC 75 MG Oral Tab EC  8/8/22  Qty#180 R-0    Medication request: gabapentin 300 MG Oral Cap  6/4/22  Qty#360 R-0    **MAIL SERVICE**    \" Hi Dr Woodard Click  I'm tolerating the Duloxetine very well I've noticed a 50% reduction in pain I could probably tolerate a higher dosages.   \"

## 2022-08-30 NOTE — TELEPHONE ENCOUNTER
Please check and see if he is tolerating duloxetine 60mg. If he is we can prescribe him 60mg capsules to decrease the pill burden.

## 2022-09-09 ENCOUNTER — TELEPHONE (OUTPATIENT)
Dept: FAMILY MEDICINE CLINIC | Facility: CLINIC | Age: 67
End: 2022-09-09

## 2022-09-09 NOTE — TELEPHONE ENCOUNTER
Patient scheduled an appointment via mychart with PCP for 10/6/22 noting the following on appointment notes    Chest pain    [place] 9/9/22

## 2022-09-09 NOTE — TELEPHONE ENCOUNTER
First Call attempt. Left Voicemail to call back our office. Office phone number provided with office hours. MyChart sent by Appointment Desk to patient, advising to speak with RN. Patient has not read it yet.

## 2022-09-10 NOTE — TELEPHONE ENCOUNTER
Left message #2. Mychart message was sent in response to appointment scheduled on alternative encounter.

## 2022-09-15 ENCOUNTER — OFFICE VISIT (OUTPATIENT)
Dept: SURGERY | Facility: CLINIC | Age: 67
End: 2022-09-15
Payer: MEDICARE

## 2022-09-15 DIAGNOSIS — R30.0 DYSURIA: Primary | ICD-10-CM

## 2022-09-15 DIAGNOSIS — N52.9 ERECTILE DYSFUNCTION, UNSPECIFIED ERECTILE DYSFUNCTION TYPE: ICD-10-CM

## 2022-09-15 DIAGNOSIS — C61 PROSTATE CANCER (HCC): ICD-10-CM

## 2022-09-15 PROCEDURE — 99214 OFFICE O/P EST MOD 30 MIN: CPT | Performed by: UROLOGY

## 2022-09-15 PROCEDURE — 51798 US URINE CAPACITY MEASURE: CPT | Performed by: UROLOGY

## 2022-09-16 ENCOUNTER — LAB ENCOUNTER (OUTPATIENT)
Dept: LAB | Facility: HOSPITAL | Age: 67
End: 2022-09-16
Attending: UROLOGY

## 2022-09-16 DIAGNOSIS — R30.0 DYSURIA: ICD-10-CM

## 2022-09-16 LAB
BILIRUB UR QL: NEGATIVE
CLARITY UR: CLEAR
COLOR UR: YELLOW
GLUCOSE UR-MCNC: 100 MG/DL
HGB UR QL STRIP.AUTO: NEGATIVE
KETONES UR-MCNC: NEGATIVE MG/DL
LEUKOCYTE ESTERASE UR QL STRIP.AUTO: NEGATIVE
NITRITE UR QL STRIP.AUTO: NEGATIVE
PH UR: 6 [PH] (ref 5–8)
PROT UR-MCNC: NEGATIVE MG/DL
SP GR UR STRIP: 1.01 (ref 1–1.03)
UROBILINOGEN UR STRIP-ACNC: 0.2

## 2022-09-16 PROCEDURE — 87086 URINE CULTURE/COLONY COUNT: CPT

## 2022-09-16 PROCEDURE — 81003 URINALYSIS AUTO W/O SCOPE: CPT

## 2022-10-06 ENCOUNTER — OFFICE VISIT (OUTPATIENT)
Dept: FAMILY MEDICINE CLINIC | Facility: CLINIC | Age: 67
End: 2022-10-06
Payer: MEDICARE

## 2022-10-06 ENCOUNTER — LAB ENCOUNTER (OUTPATIENT)
Dept: LAB | Age: 67
End: 2022-10-06
Attending: FAMILY MEDICINE
Payer: MEDICARE

## 2022-10-06 VITALS
HEART RATE: 79 BPM | SYSTOLIC BLOOD PRESSURE: 142 MMHG | DIASTOLIC BLOOD PRESSURE: 84 MMHG | WEIGHT: 244 LBS | HEIGHT: 76 IN | BODY MASS INDEX: 29.71 KG/M2 | TEMPERATURE: 95 F

## 2022-10-06 DIAGNOSIS — R07.89 OTHER CHEST PAIN: Primary | ICD-10-CM

## 2022-10-06 DIAGNOSIS — I10 ESSENTIAL HYPERTENSION: ICD-10-CM

## 2022-10-06 DIAGNOSIS — E11.9 DIABETES MELLITUS TYPE 2 IN NONOBESE (HCC): ICD-10-CM

## 2022-10-06 DIAGNOSIS — R07.89 OTHER CHEST PAIN: ICD-10-CM

## 2022-10-06 PROCEDURE — 99214 OFFICE O/P EST MOD 30 MIN: CPT | Performed by: FAMILY MEDICINE

## 2022-10-06 PROCEDURE — 3079F DIAST BP 80-89 MM HG: CPT | Performed by: FAMILY MEDICINE

## 2022-10-06 PROCEDURE — 1125F AMNT PAIN NOTED PAIN PRSNT: CPT | Performed by: FAMILY MEDICINE

## 2022-10-06 PROCEDURE — 93005 ELECTROCARDIOGRAM TRACING: CPT

## 2022-10-06 PROCEDURE — 3077F SYST BP >= 140 MM HG: CPT | Performed by: FAMILY MEDICINE

## 2022-10-06 PROCEDURE — 93010 ELECTROCARDIOGRAM REPORT: CPT | Performed by: FAMILY MEDICINE

## 2022-10-06 PROCEDURE — 3008F BODY MASS INDEX DOCD: CPT | Performed by: FAMILY MEDICINE

## 2022-10-18 ENCOUNTER — TELEPHONE (OUTPATIENT)
Dept: FAMILY MEDICINE CLINIC | Facility: CLINIC | Age: 67
End: 2022-10-18

## 2022-10-18 DIAGNOSIS — R07.9 CHEST PAIN, UNSPECIFIED TYPE: Primary | ICD-10-CM

## 2022-10-18 NOTE — TELEPHONE ENCOUNTER
Patient states he has a bad back and cannot do stress test on treadmill. He is requesting a different order.

## 2022-10-20 ENCOUNTER — TELEPHONE (OUTPATIENT)
Dept: FAMILY MEDICINE CLINIC | Facility: CLINIC | Age: 67
End: 2022-10-20

## 2022-10-20 DIAGNOSIS — R91.1 PULMONARY NODULE: Primary | ICD-10-CM

## 2022-10-20 DIAGNOSIS — Z12.2 SCREENING FOR LUNG CANCER: ICD-10-CM

## 2022-10-20 NOTE — TELEPHONE ENCOUNTER
Patient is scheduled on 10/31/22 for CT chest.       Future Appointments   Date Time Provider Jenifer Tillman   10/25/2022  7:00 AM [place] [facility]   10/25/2022  8:15  [address] CARD RM3 STLAB [address] NI CARD EM [place]   10/25/2022  9:15 AM [place] [facility]   10/31/2022  9:00 AM [facility] CT RM1 [facility] CT SCAN EM [facility]   11/1/2022 10:00 AM [facility]y Quintana DO [facility]   11/7/2022  3:20 PM Jodi Smith MD [facility] OF THE [facility]   12/13/2022 11:30 AM Jamil Paulino MD [facility]   3/1/2023  9:00 AM Ivone Baltazar MD [address] [place] [address]

## 2022-10-20 NOTE — TELEPHONE ENCOUNTER
Per patient, he called [facility] scheduling to schedule for his CT lung, but was told that there is no order in the system. Per patient, he had called his insurance and this is approved already, with authorization #[id number], informed that RN will review his chart and will contact him,stated understanding. Requesting call back today. See Imaging tab=4/9/2022 ORDERED CT CHEST and on 4/29/222 CT LUNG ordered =both showing NOT RELEASED . Patient has Medicare advantage PPO insurance.

## 2022-10-31 ENCOUNTER — HOSPITAL ENCOUNTER (OUTPATIENT)
Dept: CT IMAGING | Facility: HOSPITAL | Age: 67
Discharge: HOME | End: 2022-10-31
Attending: FAMILY MEDICINE
Payer: MEDICARE

## 2022-10-31 DIAGNOSIS — R91.1 PULMONARY NODULE: ICD-10-CM

## 2022-10-31 PROCEDURE — 71250 CT THORAX DX C-: CPT | Performed by: FAMILY MEDICINE

## 2022-11-01 ENCOUNTER — OFFICE VISIT (OUTPATIENT)
Dept: PHYSICAL MEDICINE AND REHAB | Facility: CLINIC | Age: 67
End: 2022-11-01
Payer: MEDICARE

## 2022-11-01 ENCOUNTER — TELEPHONE (OUTPATIENT)
Dept: PHYSICAL MEDICINE AND REHAB | Facility: CLINIC | Age: 67
End: 2022-11-01

## 2022-11-01 VITALS
BODY MASS INDEX: 29.83 KG/M2 | WEIGHT: 245 LBS | SYSTOLIC BLOOD PRESSURE: 134 MMHG | DIASTOLIC BLOOD PRESSURE: 70 MMHG | HEIGHT: 76 IN

## 2022-11-01 DIAGNOSIS — M54.14 THORACIC RADICULOPATHY: Primary | ICD-10-CM

## 2022-11-01 DIAGNOSIS — M48.062 LUMBAR STENOSIS WITH NEUROGENIC CLAUDICATION: ICD-10-CM

## 2022-11-01 PROCEDURE — 3008F BODY MASS INDEX DOCD: CPT | Performed by: PHYSICAL MEDICINE & REHABILITATION

## 2022-11-01 PROCEDURE — 99214 OFFICE O/P EST MOD 30 MIN: CPT | Performed by: PHYSICAL MEDICINE & REHABILITATION

## 2022-11-01 PROCEDURE — 3075F SYST BP GE 130 - 139MM HG: CPT | Performed by: PHYSICAL MEDICINE & REHABILITATION

## 2022-11-01 PROCEDURE — 3078F DIAST BP <80 MM HG: CPT | Performed by: PHYSICAL MEDICINE & REHABILITATION

## 2022-11-01 NOTE — TELEPHONE ENCOUNTER
Patient returning phone call from our office to schedule his apt. Pt a little upset asking why when the office calls and leave a msg to call back to schedule appointments they are never able to get a hold of someone to schedule when they call back. I inform that we in the  can only forward the call to [address] [place] [address] Francoise Mcgee and nurses and they at times are rooming patients for the day.

## 2022-11-01 NOTE — TELEPHONE ENCOUNTER
Initiated authorization for Bilateral L4 transforaminal epidural steroid injection under fluoroscopy CPT 98396-25+15550 dx:M48.062 to be done at [facility] with [employer] online  Status: Approved w/ authorization #[id number] valid 11/1/22-4/30/2023

## 2022-11-02 ENCOUNTER — HOSPITAL ENCOUNTER (OUTPATIENT)
Dept: NUCLEAR MEDICINE | Facility: HOSPITAL | Age: 67
Discharge: HOME | End: 2022-11-02
Attending: FAMILY MEDICINE
Payer: MEDICARE

## 2022-11-02 ENCOUNTER — HOSPITAL ENCOUNTER (OUTPATIENT)
Dept: CV DIAGNOSTICS | Facility: HOSPITAL | Age: 67
Discharge: HOME | End: 2022-11-02
Attending: FAMILY MEDICINE
Payer: MEDICARE

## 2022-11-02 ENCOUNTER — NURSE ONLY (OUTPATIENT)
Dept: FAMILY MEDICINE CLINIC | Facility: CLINIC | Age: 67
End: 2022-11-02
Payer: MEDICARE

## 2022-11-02 DIAGNOSIS — Z23 NEED FOR VACCINATION: Primary | ICD-10-CM

## 2022-11-02 DIAGNOSIS — R07.9 CHEST PAIN, UNSPECIFIED TYPE: ICD-10-CM

## 2022-11-02 PROCEDURE — G0008 ADMIN INFLUENZA VIRUS VAC: HCPCS | Performed by: STUDENT IN AN ORGANIZED HEALTH CARE EDUCATION/TRAINING PROGRAM

## 2022-11-02 PROCEDURE — 78452 HT MUSCLE IMAGE SPECT MULT: CPT | Performed by: FAMILY MEDICINE

## 2022-11-02 PROCEDURE — 93016 CV STRESS TEST SUPVJ ONLY: CPT | Performed by: FAMILY MEDICINE

## 2022-11-02 PROCEDURE — 93018 CV STRESS TEST I&R ONLY: CPT | Performed by: FAMILY MEDICINE

## 2022-11-02 PROCEDURE — 93017 CV STRESS TEST TRACING ONLY: CPT | Performed by: FAMILY MEDICINE

## 2022-11-02 PROCEDURE — 90662 IIV NO PRSV INCREASED AG IM: CPT | Performed by: STUDENT IN AN ORGANIZED HEALTH CARE EDUCATION/TRAINING PROGRAM

## 2022-11-02 NOTE — TELEPHONE ENCOUNTER
Patient has been scheduled for   Bilateral L4 transforaminal epidural steroid injection under fluoroscopy, local    on 11/14/2022 at the  [address] [place] [address] with Dr. Dominic Louie. -Anesthesia type: LOCAL. -If scheduling [facility] covid testing required for all procedures whether patient is vaccinated or not. -Patient informed not to eat or drink anything after midnight the night prior to the procedure, if being sedated. (Patient informed to fast 12 hours prior to procedure with IVCS/MAC. )  -Patient was advised that if he/she does receive the covid vaccine it needs to be at least 2 weeks before or after the injection. -Medications and allergies reviewed. -Patient reminded to hold NSAIDs (Ibuprofen, ASA 81, Aleve, Naproxen, Mobic, Diclofenac, Etodolac, Celebrex etc.) for 3 days prior to [facility]  if BMI is greater than 35. For Cervical injections only hold multivitamins, Vitamin E, Fish Oil, Phentermine (Lomaira) for 7 days prior to injection and NSAIDS.  mg to be held for 7 days prior to injections.  -If patient is receiving MAC/IVCS Phentermine Barbara Moat) will need to be held for 7 days prior to injection.  -If on blood thinner clearance has been received to hold this medication by provider.   -Patient informed he/she will need a  to and from procedure. -[facility] is located in the [place] for [facility] 1st floor. Patient may park in the yellow parking. Patient verbalized understanding and agrees with plan.  -----> Scheduled in Epic: Yes  -----> Scheduled in [employer]:  Yes

## 2022-11-03 ENCOUNTER — IMMUNIZATION (OUTPATIENT)
Dept: LAB | Age: 67
End: 2022-11-03
Attending: EMERGENCY MEDICINE
Payer: MEDICARE

## 2022-11-03 DIAGNOSIS — Z23 NEED FOR VACCINATION: Primary | ICD-10-CM

## 2022-11-03 PROCEDURE — 0134A SARSCOV2 VAC BVL 50MCG/0.5ML: CPT

## 2022-11-14 ENCOUNTER — OFFICE VISIT (OUTPATIENT)
Dept: SURGERY | Facility: CLINIC | Age: 67
End: 2022-11-14
Payer: MEDICARE

## 2022-11-14 DIAGNOSIS — M48.062 LUMBAR STENOSIS WITH NEUROGENIC CLAUDICATION: ICD-10-CM

## 2022-11-14 DIAGNOSIS — M48.062 LUMBAR STENOSIS WITH NEUROGENIC CLAUDICATION: Primary | ICD-10-CM

## 2022-11-14 NOTE — PROCEDURES
[facility] [address]    BILATERAL LUMBAR TRANSFORAMINAL   NAME:  Joseluis Hurtado MR #:    [id number] :  1955     PHYSICIAN:  Halle Hendrix DO        Operative Report    DATE OF PROCEDURE: 2022   PREOPERATIVE DIAGNOSES: 1. Lumbar stenosis with neurogenic claudication        POSTOPERATIVE DIAGNOSES:   1. Lumbar stenosis with neurogenic claudication        PROCEDURES: Bilateral L4 transforaminal epidural steroid injections done under fluoroscopic guidance with contrast enhancement. SURGEON: Halle Hendrix DO   ANESTHESIA: Local   INDICATIONS:      OPERATIVE PROCEDURE:  Written consent was obtained from the patient. The patient was brought into the operating room and placed in the prone position on the fluoroscopy table with pillow underneath her abdomen. The patient's skin was cleaned and draped in a normal sterile fashion. Using AP fluoroscopy, all five lumbar vertebrae were identified. When the fourth vertebra was identified, fluoroscopy was left anterior obliqued opening up the right L4-5 intervertebral foramen. At this point in time, the patient's skin was anesthetized with 1% PF lidocaine without epinephrine. Then, a 5 inch, 22-gauge spinal needle was inserted and directed towards the right L4-5 intervertebral foramen. When it felt to be in good position, AP fluoroscopy was used to advance the needle to the 6 o'clock position on the right L4 pedicle. At this point in time, Omnipaque-240 contrast was used to obtain a good epidurogram indicating correct needle placement. Then, aspiration was performed. No blood, fluid, or air was aspirated. Then, the patient was injected with a 3 cc solution of 1.5 cc of 6 mg/cc of celestone and 1.5 cc of 1% PF lidocaine without epinephrine. After this, the needle was removed. Then  fluoroscopy was right anterior obliqued opening up the left L4-5 intervertebral foramen.   At this point in time, the patient's skin was anesthetized with 1 to 2 cc of 1% PF lidocaine without epinephrine. Then, a 5 inch, 22-gauge spinal needle was inserted and directed towards the left L4-5 intervertebral foramen. When it felt to be in good position, AP fluoroscopy was used to advance the needle to the 6 o'clock position on the left L4 pedicle. At this point in time, Omnipaque-240 contrast was used to obtain a good epidurogram indicating correct needle placement. Then, aspiration was performed. No blood, fluid, or air was aspirated. Then, the patient was injected with a 3 cc solution of 1.5 cc of 6 mg/cc of celestone and 1.5 cc of 1% PF lidocaine without epinephrine. After this, the needle was removed. The patient's skin was cleaned. Band-Aids were applied. The patient was transferred to the cart and into [facility]. The patient was given discharge instructions and will follow up in the clinic as scheduled. Throughout the whole procedure, the patient's pulse oximetry and vital signs were monitored and they remained completely stable. Also, throughout the whole procedure, prior to injection of any medication, aspiration was performed. No blood, fluid, or air was aspirated at anytime.

## 2022-11-15 RX ORDER — TIZANIDINE 2 MG/1
TABLET ORAL
Qty: 90 TABLET | Refills: 0 | Status: SHIPPED | OUTPATIENT
Start: 2022-11-15

## 2022-11-15 RX ORDER — DICLOFENAC SODIUM 75 MG/1
TABLET, DELAYED RELEASE ORAL
Qty: 180 TABLET | Refills: 0 | Status: SHIPPED | OUTPATIENT
Start: 2022-11-15

## 2022-11-15 NOTE — TELEPHONE ENCOUNTER
Refill Request    Medication request: TIZANIDINE 2 MG Oral Tab    LOV: 11/14/22 [facility] Giovanni Velasquez DO   RTC: F/u post injection  NOV: Visit date not found      [place] ref[place]: 8/30/22 #90 (90D)    Medication request: DICLOFENAC 75 MG Oral Tab EC     [place]/Last refill: 8/30/22 #740    UDS: (if applicable): None  Pain contract: None    LOV plan (if weaning or changing medications): N/A mentioned.

## 2022-11-18 RX ORDER — LANCETS
1 EACH MISCELLANEOUS DAILY
Qty: 100 EACH | Refills: 3 | Status: SHIPPED | OUTPATIENT
Start: 2022-11-18

## 2022-11-18 RX ORDER — BLOOD SUGAR DIAGNOSTIC
1 STRIP MISCELLANEOUS DAILY
Qty: 100 STRIP | Refills: 3 | Status: SHIPPED | OUTPATIENT
Start: 2022-11-18

## 2022-11-18 NOTE — TELEPHONE ENCOUNTER
Refill passed per [address] [place] protocol. Requested Prescriptions   Pending Prescriptions Disp Refills    Glucose Blood (ACCU-CHEK GUIDE) In Vitro Strip  0     Si strip by In Vitro route daily. Diabetic Supplies Protocol Passed - 2022  2:38 PM        Passed - In person appointment or virtual visit in the past 12 mos or appointment in next 3 mos     Recent Outpatient Visits              4 days ago Lumbar stenosis with neurogenic claudication    [facility] Teena, DO    Office Visit    2 weeks ago Need for vaccination    [address] [place], [facility] [place]    Nurse Only    2 weeks ago Thoracic radiculopathy    [facility], DO    Office Visit    1 month ago Other chest pain    Marcus Downs MD    Office Visit    2 months ago [facility], [address] [facility],[address], Donya Bolanos MD    Office Visit          Future Appointments         Provider Department Appt Notes    In 3 weeks Al Almeida MD [address] [place] Follow up after I blood test                 [employer] Does not apply Misc  0     Sig: by In Vitro route daily.        Diabetic Supplies Protocol Passed - 2022  2:38 PM        Passed - In person appointment or virtual visit in the past 12 mos or appointment in next 3 mos     Recent Outpatient Visits              4 days ago Lumbar stenosis with neurogenic claudication    [facility] Stinnettraul Dickinson, DO    Office Visit    2 weeks ago Need for vaccination    [address] [place], [facility] [place]    Nurse Only    2 weeks ago Thoracic radiculopathy    [facility], DO    Office Visit    1 month ago Other chest pain    Marcus Downs MD    Office Visit    2 months ago [address] [facility], [address] [facility] [address], [place], [place]    Office Visit          Future Appointments         Provider Department Appt Notes    In 3 weeks Mallory Morrison MD [address], [place] Follow up after I blood test                     Recent Outpatient Visits              4 days ago Lumbar stenosis with neurogenic claudication    [facility] Fiordaliza Duron, DO    Office Visit    2 weeks ago Need for vaccination    [address] [place], [address]    Nurse Only    2 weeks ago Thoracic radiculopathy    [address] [facility], [place] Tereso Husbands, DO    Office Visit    1 month ago Other chest pain    Neel Posey MD    Office Visit    2 months ago [facility]    Office Visit             Future Appointments         Provider Department Appt Notes    In 3 weeks Mallory Morrison MD [address] Follow up after I blood test

## 2023-01-12 ENCOUNTER — MED REC SCAN ONLY (OUTPATIENT)
Dept: FAMILY MEDICINE CLINIC | Facility: CLINIC | Age: 68
End: 2023-01-12

## 2023-03-10 DIAGNOSIS — M48.062 LUMBAR STENOSIS WITH NEUROGENIC CLAUDICATION: ICD-10-CM

## 2023-03-10 RX ORDER — DICLOFENAC SODIUM 75 MG/1
75 TABLET, DELAYED RELEASE ORAL 2 TIMES DAILY
Qty: 180 TABLET | Refills: 0 | Status: SHIPPED | OUTPATIENT
Start: 2023-03-10

## 2023-03-10 RX ORDER — GABAPENTIN 300 MG/1
600 CAPSULE ORAL 3 TIMES DAILY
Qty: 360 CAPSULE | Refills: 2 | Status: SHIPPED | OUTPATIENT
Start: 2023-03-10

## 2023-03-10 NOTE — TELEPHONE ENCOUNTER
Refill Request    Medication request: DICLOFENAC 75 MG Oral Tab EC    LOV:11/1/2022 Duong Ruiz DO   RTC: N/A  NOV: Visit date not found      [place] refill: 10/19/22 #90 Days    UDS: (if applicable): N/A  Pain contract: N/A    LOV plan (if weaning or changing medications):    Not mentioned in LOV    Refill Request    Medication request: GABAPENTIN 300 MG Oral Cap    LOV:11/1/2022 Duong Ruiz DO        [place] refill: 11/14/22 #180 days

## 2023-03-14 ENCOUNTER — TELEPHONE (OUTPATIENT)
Dept: PHYSICAL MEDICINE AND REHAB | Facility: CLINIC | Age: 68
End: 2023-03-14

## 2023-03-14 DIAGNOSIS — M48.062 LUMBAR STENOSIS WITH NEUROGENIC CLAUDICATION: ICD-10-CM

## 2023-03-14 NOTE — TELEPHONE ENCOUNTER
Marcos MURPHY 3/10/23    Refill Request    Medication request: DULoxetine 60 MG Oral Cap DR Particles    LOV:11/1/2022 Mliss Beverage, DO   RTC: Post injection on 11/14/22 EOS  NOV: Visit date not found      [place] refill: 8/30/22 #180 r-2     UDS: (if applicable): None  Pain contract: None    LOV plan (if weaning or changing medications): N/A     Will confirm if patient needs rx.

## 2023-03-14 NOTE — TELEPHONE ENCOUNTER
New script for DICLOFENAC 75 MG Oral Tab EC  GABAPENTIN 300 MG Oral Cap  DULoxetine 60 MG Oral Cap DR Particles- placed in nurses bin.

## 2023-03-15 RX ORDER — DULOXETIN HYDROCHLORIDE 60 MG/1
60 CAPSULE, DELAYED RELEASE ORAL DAILY
Qty: 180 CAPSULE | Refills: 2 | Status: SHIPPED | OUTPATIENT
Start: 2023-03-15

## 2023-03-30 ENCOUNTER — OFFICE VISIT (OUTPATIENT)
Dept: PHYSICAL MEDICINE AND REHAB | Facility: CLINIC | Age: 68
End: 2023-03-30
Payer: MEDICARE

## 2023-03-30 VITALS
SYSTOLIC BLOOD PRESSURE: 140 MMHG | DIASTOLIC BLOOD PRESSURE: 84 MMHG | HEIGHT: 76 IN | OXYGEN SATURATION: 98 % | WEIGHT: 250 LBS | RESPIRATION RATE: 18 BRPM | HEART RATE: 80 BPM | BODY MASS INDEX: 30.44 KG/M2

## 2023-03-30 DIAGNOSIS — R20.0 NUMBNESS OF RIGHT HAND: Primary | ICD-10-CM

## 2023-03-30 PROCEDURE — 3079F DIAST BP 80-89 MM HG: CPT | Performed by: PHYSICAL MEDICINE & REHABILITATION

## 2023-03-30 PROCEDURE — 3077F SYST BP >= 140 MM HG: CPT | Performed by: PHYSICAL MEDICINE & REHABILITATION

## 2023-03-30 PROCEDURE — 1125F AMNT PAIN NOTED PAIN PRSNT: CPT | Performed by: PHYSICAL MEDICINE & REHABILITATION

## 2023-03-30 PROCEDURE — 99213 OFFICE O/P EST LOW 20 MIN: CPT | Performed by: PHYSICAL MEDICINE & REHABILITATION

## 2023-03-30 PROCEDURE — 3008F BODY MASS INDEX DOCD: CPT | Performed by: PHYSICAL MEDICINE & REHABILITATION

## 2023-03-30 NOTE — PATIENT INSTRUCTIONS
Type in [facility] into a search engine. Wear the wrist brace at night time at minimum for 1 month. You may wear it during the daytime if you have significant pain, but you do not have to. You may choose to wear the wrist brace if you are going to do activity that you know is going to aggravate the pain. If you are no better in 1 month contact the clinic through either MyChart or telephone call and we will get you set up for a EMG (nerve test) to check for carpal tunnel syndrome and rule out other nerve problems.

## 2023-05-09 DIAGNOSIS — M48.062 LUMBAR STENOSIS WITH NEUROGENIC CLAUDICATION: ICD-10-CM

## 2023-05-09 NOTE — TELEPHONE ENCOUNTER
Refill Request    Medication request: gabapentin 300 MG Oral Cap. Take 2 capsules (600 mg total) by mouth 3 (three) times daily. LOV:3/30/2023 Mati Pinto,    Due back to clinic per last office note: Per Dr. Adriel Paulino: Mónica Jackman you are no better in 1 month contact the clinic through either MyChart or telephone call and we will get you set up for a EMG (nerve test) to check for carpal tunnel syndrome and rule out other nerve problems. \"  NOV: Visit date not found      [place]: 03/10/2023 #360 -[facility]ills [facility] [place]. Confirmed with Roz date of last refill and the remaining refills. Urine drug screen (if applicable): n/a  Pain contract: n/a    LOV plan (if weaning or changing medications): Not mentioned in LOV note. Medication request: TIZANIDINE 2 MG Oral Tab. TAKE 1 TABLET AT BEDTIME PRN. [place] refill: 12/17/2023 #90 - 90 day supply    LOV plan (if weaning or changing medications): Not mentioned in LOV note.

## 2023-05-10 RX ORDER — ATORVASTATIN CALCIUM 20 MG/1
20 TABLET, FILM COATED ORAL NIGHTLY
Qty: 30 TABLET | Refills: 0 | Status: SHIPPED | OUTPATIENT
Start: 2023-05-10 | End: 2023-05-12

## 2023-05-10 NOTE — TELEPHONE ENCOUNTER
Please review; protocol failed. Requested Prescriptions   Pending Prescriptions Disp Refills    atorvastatin 20 MG Oral Tab  0     Si tablet (20 mg total) daily.        Cholesterol Medication Protocol Failed - 2023  8:04 AM        Failed - ALT in past 12 months        Failed - LDL in past 12 months        Failed - Last ALT < 80     Lab Results   Component Value Date    ALT 60 2020             Failed - Last LDL < 130     Lab Results   Component Value Date    LDL 45 2020               Passed - In person appointment or virtual visit in the past 12 mos or appointment in next 3 mos     Recent Outpatient Visits              1 month ago Numbness of right hand    Jessebrittany Robbins, [address], [place], [place]    Office Visit    5 months ago Lumbar stenosis with neurogenic claudication    [facility] Lluvia Dunham DO    Office Visit    6 months ago Need for vaccination    Arianna Robbins, [address] [facility] [place]    Nurse Only    6 months ago Thoracic radiculopathy    Arianna Robbins, [address], [place] pope DO    Office Visit    7 months ago Other chest pain    Nelma Eisenmenger, Eb Escoto MD    Office Visit          Future Appointments         Provider Department Appt Notes    In 2 days Sierra Vanegas MD [facility], [address] [place] [address] Check up                 metFORMIN 500 MG Oral Tab 180 tablet 1     Si po every day for 7 days then 1 po bid       Diabetes Medication Protocol Failed - 2023  8:04 AM        Failed - Last A1C < 7.5 and within past 6 months     Lab Results   Component Value Date    A1C 6.6 (H) 2020               Failed - In person appointment or virtual visit in the past 6 mos or appointment in next 3 mos     Recent Outpatient Visits              1 month ago Numbness of right hand    Jesseravenbarry Rosendo, [address], [place] Jared Card, DO    Office Visit    5 months ago Lumbar stenosis with neurogenic claudication    [facility] Bradford Card, DO    Office Visit    6 months ago Need for vaccination    [address], [place], [place]    Nurse Only    6 months ago Thoracic radiculopathy    [address] [facility], [place] James Greer, DO    Office Visit    7 months ago Other chest pain    Jose Lake, MD    Office Visit          Future Appointments         Provider Department Appt Notes    In 2 days [facility], [place], [place] Check up               Failed - EGFRCR or GFRAA > 50     GFR Evaluation              Failed - GFR in the past 12 months             Future Appointments         Provider Department Appt Notes    In 2 days Devin Dowd MD [address], [place], [place] Check up            Recent Outpatient Visits              1 month ago Numbness of right hand    [address], [address] [facility], [place], [place]    Office Visit    5 months ago Lumbar stenosis with neurogenic claudication    [facility] Jared King, DO    Office Visit    6 months ago Need for vaccination    [address], [place], [place]    Nurse Only    6 months ago Thoracic radiculopathy    [address] [facility], [place] King, DO    Office Visit    7 months ago Other chest pain    Jose Lake MD    Office Visit

## 2023-05-11 NOTE — TELEPHONE ENCOUNTER
To pharmacy: Needs to make an apt for further refills  Call center please call and schedule an appointment. Thank You. Mychart message sent to the patient.

## 2023-05-12 ENCOUNTER — OFFICE VISIT (OUTPATIENT)
Dept: FAMILY MEDICINE CLINIC | Facility: CLINIC | Age: 68
End: 2023-05-12

## 2023-05-12 VITALS
HEIGHT: 76 IN | RESPIRATION RATE: 18 BRPM | HEART RATE: 70 BPM | SYSTOLIC BLOOD PRESSURE: 144 MMHG | OXYGEN SATURATION: 98 % | DIASTOLIC BLOOD PRESSURE: 60 MMHG | WEIGHT: 247 LBS | BODY MASS INDEX: 30.08 KG/M2

## 2023-05-12 DIAGNOSIS — D12.6 TUBULAR ADENOMA OF COLON: ICD-10-CM

## 2023-05-12 DIAGNOSIS — M54.16 LUMBAR RADICULOPATHY: ICD-10-CM

## 2023-05-12 DIAGNOSIS — I10 ESSENTIAL HYPERTENSION: ICD-10-CM

## 2023-05-12 DIAGNOSIS — Z12.11 SCREENING FOR COLON CANCER: ICD-10-CM

## 2023-05-12 DIAGNOSIS — D69.6 THROMBOCYTOPENIA (HCC): Chronic | ICD-10-CM

## 2023-05-12 DIAGNOSIS — E11.65 TYPE 2 DIABETES MELLITUS WITH HYPERGLYCEMIA, WITHOUT LONG-TERM CURRENT USE OF INSULIN (HCC): ICD-10-CM

## 2023-05-12 DIAGNOSIS — E78.1 PURE HYPERTRIGLYCERIDEMIA: ICD-10-CM

## 2023-05-12 DIAGNOSIS — E11.9 TYPE 2 DIABETES MELLITUS WITHOUT RETINOPATHY (HCC): ICD-10-CM

## 2023-05-12 DIAGNOSIS — K76.0 FATTY LIVER: ICD-10-CM

## 2023-05-12 DIAGNOSIS — Z00.00 ENCOUNTER FOR ANNUAL HEALTH EXAMINATION: ICD-10-CM

## 2023-05-12 DIAGNOSIS — Z82.49 FAMILY HISTORY OF BRAIN ANEURYSM: ICD-10-CM

## 2023-05-12 DIAGNOSIS — R91.1 PULMONARY NODULE: ICD-10-CM

## 2023-05-12 DIAGNOSIS — E66.9 DIABETES MELLITUS TYPE 2 IN OBESE (HCC): Primary | ICD-10-CM

## 2023-05-12 DIAGNOSIS — C61 PROSTATE CANCER (HCC): ICD-10-CM

## 2023-05-12 DIAGNOSIS — E11.69 DIABETES MELLITUS TYPE 2 IN OBESE (HCC): Primary | ICD-10-CM

## 2023-05-12 DIAGNOSIS — E66.2 HYPOVENTILATION ASSOCIATED WITH OBESITY SYNDROME (HCC): ICD-10-CM

## 2023-05-12 LAB
CARTRIDGE LOT#: ABNORMAL NUMERIC
HEMOGLOBIN A1C: 6 % (ref 4.3–5.6)

## 2023-05-12 RX ORDER — AMLODIPINE BESYLATE 5 MG/1
5 TABLET ORAL DAILY
Qty: 90 TABLET | Refills: 1 | Status: SHIPPED | OUTPATIENT
Start: 2023-05-12 | End: 2024-05-06

## 2023-05-12 RX ORDER — ATORVASTATIN CALCIUM 20 MG/1
20 TABLET, FILM COATED ORAL NIGHTLY
Qty: 90 TABLET | Refills: 3 | Status: SHIPPED | OUTPATIENT
Start: 2023-05-12

## 2023-05-12 RX ORDER — PANTOPRAZOLE SODIUM 40 MG/1
40 TABLET, DELAYED RELEASE ORAL
Qty: 90 TABLET | Refills: 1 | Status: SHIPPED | OUTPATIENT
Start: 2023-05-12

## 2023-05-12 RX ORDER — LOSARTAN POTASSIUM 100 MG/1
100 TABLET ORAL DAILY
Qty: 90 TABLET | Refills: 1 | Status: SHIPPED | OUTPATIENT
Start: 2023-05-12

## 2023-05-15 RX ORDER — GABAPENTIN 300 MG/1
600 CAPSULE ORAL 3 TIMES DAILY
Qty: 360 CAPSULE | Refills: 2 | Status: SHIPPED | OUTPATIENT
Start: 2023-05-15

## 2023-05-15 RX ORDER — TIZANIDINE 2 MG/1
2 TABLET ORAL NIGHTLY PRN
Qty: 90 TABLET | Refills: 0 | Status: SHIPPED | OUTPATIENT
Start: 2023-05-15

## 2023-05-15 NOTE — TELEPHONE ENCOUNTER
Patient called, verified Name and . He is returning a call regarding below. He will schedule an appointment thru [address]

## 2023-05-26 ENCOUNTER — LAB ENCOUNTER (OUTPATIENT)
Dept: LAB | Facility: HOSPITAL | Age: 68
End: 2023-05-26
Attending: FAMILY MEDICINE
Payer: MEDICARE

## 2023-05-26 DIAGNOSIS — E66.9 DIABETES MELLITUS TYPE 2 IN OBESE (HCC): ICD-10-CM

## 2023-05-26 DIAGNOSIS — E11.69 DIABETES MELLITUS TYPE 2 IN OBESE (HCC): ICD-10-CM

## 2023-05-26 LAB
ALBUMIN SERPL-MCNC: 3.7 G/DL (ref 3.4–5)
ALBUMIN/GLOB SERPL: 1 {RATIO} (ref 1–2)
ALP LIVER SERPL-CCNC: 68 U/L
ALT SERPL-CCNC: 32 U/L
ANION GAP SERPL CALC-SCNC: 10 MMOL/L (ref 0–18)
AST SERPL-CCNC: 21 U/L (ref 15–37)
BASOPHILS # BLD AUTO: 0.11 X10(3) UL (ref 0–0.2)
BASOPHILS NFR BLD AUTO: 1 %
BILIRUB SERPL-MCNC: 0.2 MG/DL (ref 0.1–2)
BUN BLD-MCNC: 17 MG/DL (ref 7–18)
BUN/CREAT SERPL: 19.8 (ref 10–20)
CALCIUM BLD-MCNC: 9.7 MG/DL (ref 8.5–10.1)
CHLORIDE SERPL-SCNC: 107 MMOL/L (ref 98–112)
CHOLEST SERPL-MCNC: 133 MG/DL (ref ?–200)
CO2 SERPL-SCNC: 23 MMOL/L (ref 21–32)
CREAT BLD-MCNC: 0.86 MG/DL
DEPRECATED RDW RBC AUTO: 52.7 FL (ref 35.1–46.3)
EOSINOPHIL # BLD AUTO: 0.46 X10(3) UL (ref 0–0.7)
EOSINOPHIL NFR BLD AUTO: 4.2 %
ERYTHROCYTE [DISTWIDTH] IN BLOOD BY AUTOMATED COUNT: 16.2 % (ref 11–15)
FASTING PATIENT LIPID ANSWER: YES
FASTING STATUS PATIENT QL REPORTED: YES
GFR SERPLBLD BASED ON 1.73 SQ M-ARVRAT: 94 ML/MIN/1.73M2 (ref 60–?)
GLOBULIN PLAS-MCNC: 3.8 G/DL (ref 2.8–4.4)
GLUCOSE BLD-MCNC: 128 MG/DL (ref 70–99)
HCT VFR BLD AUTO: 45.9 %
HDLC SERPL-MCNC: 34 MG/DL (ref 40–59)
HGB BLD-MCNC: 15 G/DL
IMM GRANULOCYTES # BLD AUTO: 0.05 X10(3) UL (ref 0–1)
IMM GRANULOCYTES NFR BLD: 0.5 %
LDLC SERPL CALC-MCNC: 52 MG/DL (ref ?–100)
LYMPHOCYTES # BLD AUTO: 1.91 X10(3) UL (ref 1–4)
LYMPHOCYTES NFR BLD AUTO: 17.4 %
MCH RBC QN AUTO: 29.2 PG (ref 26–34)
MCHC RBC AUTO-ENTMCNC: 32.7 G/DL (ref 31–37)
MCV RBC AUTO: 89.3 FL
MONOCYTES # BLD AUTO: 0.78 X10(3) UL (ref 0.1–1)
MONOCYTES NFR BLD AUTO: 7.1 %
NEUTROPHILS # BLD AUTO: 7.66 X10 (3) UL (ref 1.5–7.7)
NEUTROPHILS # BLD AUTO: 7.66 X10(3) UL (ref 1.5–7.7)
NEUTROPHILS NFR BLD AUTO: 69.8 %
NONHDLC SERPL-MCNC: 99 MG/DL (ref ?–130)
OSMOLALITY SERPL CALC.SUM OF ELEC: 293 MOSM/KG (ref 275–295)
PLATELET # BLD AUTO: 219 10(3)UL (ref 150–450)
POTASSIUM SERPL-SCNC: 4.6 MMOL/L (ref 3.5–5.1)
PROT SERPL-MCNC: 7.5 G/DL (ref 6.4–8.2)
RBC # BLD AUTO: 5.14 X10(6)UL
SODIUM SERPL-SCNC: 140 MMOL/L (ref 136–145)
TRIGL SERPL-MCNC: 307 MG/DL (ref 30–149)
VLDLC SERPL CALC-MCNC: 44 MG/DL (ref 0–30)
WBC # BLD AUTO: 11 X10(3) UL (ref 4–11)

## 2023-05-26 PROCEDURE — 80053 COMPREHEN METABOLIC PANEL: CPT

## 2023-05-26 PROCEDURE — 85025 COMPLETE CBC W/AUTO DIFF WBC: CPT

## 2023-05-26 PROCEDURE — 80061 LIPID PANEL: CPT

## 2023-05-26 PROCEDURE — 36415 COLL VENOUS BLD VENIPUNCTURE: CPT

## 2023-05-31 RX ORDER — ATORVASTATIN CALCIUM 20 MG/1
TABLET, FILM COATED ORAL
Qty: 30 TABLET | Refills: 0 | OUTPATIENT
Start: 2023-05-31

## 2023-06-02 ENCOUNTER — PATIENT MESSAGE (OUTPATIENT)
Dept: ADMINISTRATIVE | Age: 68
End: 2023-06-02

## 2023-06-02 NOTE — TELEPHONE ENCOUNTER
Alycia Sullivan  online to initiate authorization    CT CHEST (CPT=71250)    Scheduled For: 06/03/23    Request Status: Pending Authorization     Case Number: [id number]    Clinical notes sent for review. Patient notified of pending status via [address] [place] [address] [address].

## 2023-06-06 DIAGNOSIS — M48.062 LUMBAR STENOSIS WITH NEUROGENIC CLAUDICATION: ICD-10-CM

## 2023-06-07 RX ORDER — DICLOFENAC SODIUM 75 MG/1
TABLET, DELAYED RELEASE ORAL
Qty: 180 TABLET | Refills: 0 | OUTPATIENT
Start: 2023-06-07

## 2023-06-07 RX ORDER — TIZANIDINE 2 MG/1
TABLET ORAL
Qty: 90 TABLET | Refills: 0 | OUTPATIENT
Start: 2023-06-07

## 2023-06-07 NOTE — TELEPHONE ENCOUNTER
Refill Request    Medication request: tiZANidine 2 MG Oral Tab. Take 1 tablet (2 mg total) by mouth nightly as needed. AT BEDTIME.     LOV:3/30/2023 Tiffany Szymanski,    Due back to clinic per last office note: Per Dr. Goss Counter: \"If you are no better in 1 month contact the clinic through either MyChart or telephone call and we will get you set up for a EMG (nerve test) to check for carpal tunnel syndrome and rule out other nerve problems. \"  NOV: Visit date not found      [place]: 05/15/2023 #90 - 90 [place]. - Refill request too early: Earliest fill date: 08/13/23. Refill request removed. Urine drug screen (if applicable): n/a  Pain contract: n/a    LOV plan (if weaning or changing medications): Not mentioned in LOV note        Duplicate request for Diclofenac - removed. Signed by Dr. Ruth Kehr on 06/06/23.

## 2023-06-08 DIAGNOSIS — M48.062 LUMBAR STENOSIS WITH NEUROGENIC CLAUDICATION: ICD-10-CM

## 2023-06-08 RX ORDER — GABAPENTIN 300 MG/1
600 CAPSULE ORAL 3 TIMES DAILY
Qty: 360 CAPSULE | Refills: 2 | Status: SHIPPED | OUTPATIENT
Start: 2023-06-08

## 2023-06-08 NOTE — TELEPHONE ENCOUNTER
Refill Request    Medication request: gabapentin 300 MG Oral Cap. Take 2 capsules (600 mg total) by mouth 3 (three) times daily. LOV:3/30/2023 Duong Ruiz, DO   Due back to clinic per last office note: Per Dr. Odette Wellington: Sveta Stephenson you are no better in 1 month contact the clinic through either MyChart or telephone call and we will get you set up for a EMG (nerve test) to check for carpal tunnel syndrome and rule out other nerve problems. \"  NOV: Visit date not found      [place]: 05/07/2023 #360 [place] 60 [place] [facility]  - Sw/ patient regarding this refill date; pt denies that he ever picked that up, states his mail order pharmacy is the correct one. States he is about to be completely out of medication.   - Informed pt that I will route the medication order to Dr. Odette Wellington as high priority - confirmed correct pharmacy    Urine drug screen (if applicable): n/a  Pain contract: n/a    LOV plan (if weaning or changing medications): Not mentioned in LOV note.

## 2023-06-09 ENCOUNTER — OFFICE VISIT (OUTPATIENT)
Dept: FAMILY MEDICINE CLINIC | Facility: CLINIC | Age: 68
End: 2023-06-09

## 2023-06-09 VITALS
BODY MASS INDEX: 29.71 KG/M2 | WEIGHT: 244 LBS | HEART RATE: 97 BPM | SYSTOLIC BLOOD PRESSURE: 166 MMHG | HEIGHT: 76 IN | DIASTOLIC BLOOD PRESSURE: 90 MMHG

## 2023-06-09 DIAGNOSIS — I10 ESSENTIAL HYPERTENSION: Primary | ICD-10-CM

## 2023-06-09 PROCEDURE — 3008F BODY MASS INDEX DOCD: CPT | Performed by: FAMILY MEDICINE

## 2023-06-09 PROCEDURE — 3080F DIAST BP >= 90 MM HG: CPT | Performed by: FAMILY MEDICINE

## 2023-06-09 PROCEDURE — 1159F MED LIST DOCD IN RCRD: CPT | Performed by: FAMILY MEDICINE

## 2023-06-09 PROCEDURE — 3077F SYST BP >= 140 MM HG: CPT | Performed by: FAMILY MEDICINE

## 2023-06-09 PROCEDURE — 99213 OFFICE O/P EST LOW 20 MIN: CPT | Performed by: FAMILY MEDICINE

## 2023-06-09 PROCEDURE — 1160F RVW MEDS BY RX/DR IN RCRD: CPT | Performed by: FAMILY MEDICINE

## 2023-06-09 RX ORDER — HYDROCHLOROTHIAZIDE 25 MG/1
25 TABLET ORAL DAILY
Qty: 90 TABLET | Refills: 1 | Status: SHIPPED | OUTPATIENT
Start: 2023-06-09 | End: 2023-12-06

## 2023-06-09 RX ORDER — BLOOD SUGAR DIAGNOSTIC
1 STRIP MISCELLANEOUS DAILY
Qty: 100 STRIP | Refills: 3 | Status: SHIPPED | OUTPATIENT
Start: 2023-06-09

## 2023-06-09 RX ORDER — LANCETS
1 EACH MISCELLANEOUS DAILY
Qty: 100 EACH | Refills: 3 | Status: SHIPPED | OUTPATIENT
Start: 2023-06-09

## 2023-06-09 RX ORDER — ATORVASTATIN CALCIUM 20 MG/1
20 TABLET, FILM COATED ORAL NIGHTLY
Qty: 90 TABLET | Refills: 3 | Status: SHIPPED | OUTPATIENT
Start: 2023-06-09

## 2023-06-09 NOTE — TELEPHONE ENCOUNTER
Refill passed per [place] [employer], [facility] protocol. Requested Prescriptions   Pending Prescriptions Disp Refills    Glucose Blood (ACCU-CHEK GUIDE) In Vitro Strip 100 strip 3     Si strip by In Vitro route daily. Diabetic Supplies Protocol Passed - 2023  2:14 PM        Passed - In person appointment or virtual visit in the past 12 mos or appointment in next 3 mos     Recent Outpatient Visits              4 weeks ago Diabetes mellitus type 2 in [facility])    Berny Villafana MD    Office Visit    2 months ago Numbness of right hand    [address], [address], [place], [place]    Office Visit    6 months ago Lumbar stenosis with neurogenic claudication    [facility] Juan Jose Johnston, DO    Office Visit    7 months ago Need for vaccination    [address], [facility]    Nurse Only    7 months ago Thoracic radiculopathy    [address], [address] [facility] [facility], [place] Juan Jose Johnston, DO    Office Visit          Future Appointments         Provider Department Appt Notes    In 1 week Kenny Kennedy                 Accu-Chek FastClix Lancets Does not apply Misc  0     Sig: by In Vitro route daily.        Diabetic Supplies Protocol Passed - 2023  2:14 PM        Passed - In person appointment or virtual visit in the past 12 mos or appointment in next 3 mos     Recent Outpatient Visits              4 weeks ago Diabetes mellitus type 2 in obese [facility])    Berny Villafana MD    Office Visit    2 months ago Numbness of right hand    [address], [address], [place], [place]    Office Visit    6 months ago Lumbar stenosis with neurogenic claudication    [facility] Juan Jose Johnston, DO    Office Visit    7 months ago Need for vaccination    [address], [address] [facility]    Nurse Only    7 months ago Thoracic radiculopathy    [facility], [address], [place], [place]    Office Visit          Future Appointments         Provider Department Appt Notes    In 1 week Aicha Jasmine                     Recent Outpatient Visits              4 weeks ago Diabetes mellitus type 2 in [facility])    Ani Christopher MD    Office Visit    2 months ago Numbness of right hand    [address], [address], [place], [place]    Office Visit    6 months ago Lumbar stenosis with neurogenic claudication    [facility] Franky Amanda,     Office Visit    7 months ago Need for vaccination    [address], [address] [facility] [place]    Nurse Only    7 months ago Thoracic radiculopathy    [address], [address], [place],     Office Visit            Future Appointments         Provider Department Appt Notes    In 1 week Aicha Jasmine

## 2023-06-09 NOTE — TELEPHONE ENCOUNTER
Refill passed per [place] [employer], [facility] protocol. Requested Prescriptions   Pending Prescriptions Disp Refills    atorvastatin 20 MG Oral Tab 90 tablet 3     Sig: Take 1 tablet (20 mg total) by mouth nightly.        Cholesterol Medication Protocol Passed - 6/8/2023  2:12 PM        Passed - ALT in past 12 months        Passed - LDL in past 12 months        Passed - Last ALT < 80     Lab Results   Component Value Date    ALT 32 05/26/2023             Passed - Last LDL < 130     Lab Results   Component Value Date    LDL 52 05/26/2023               Passed - In person appointment or virtual visit in the past 12 mos or appointment in next 3 mos     Recent Outpatient Visits              4 weeks ago Diabetes mellitus type 2 in [facility])    Amelie Duenas MD    Office Visit    2 months ago Numbness of right hand    Rob Velez, [address], [place], [place]    Office Visit    6 months ago Lumbar stenosis with neurogenic claudication    [facility] Monica Beckford,     Office Visit    7 months ago Need for vaccination    Rob Velez, [address] Diamond Cooper    Nurse Only    7 months ago Thoracic radiculopathy    Rob Velez, [address], [place], [place]    Office Visit          Future Appointments         Provider Department Appt Notes    Today MD Rob Mondragon, Diamond Stewart Follow up    In 1 week Kayce Akhtar                     Recent Outpatient Visits              4 weeks ago Diabetes mellitus type 2 in [facility])    Amelie Duenas MD    Office Visit    2 months ago Numbness of right hand    Rob Velez, [address], [place], [place]    Office Visit    6 months ago Lumbar stenosis with neurogenic claudication    [facility] Lam Mace,     Office Visit    7 months ago Need for vaccination    [address], [address] [facility] [place]    Nurse Only    7 months ago Thoracic radiculopathy    [address], [address], [place], [place]    Office Visit            Future Appointments         Provider Department Appt Notes    Today Jamil Paulino MD [address], [address] [facility] [place] Follow up    In 1 week Layla Cordon

## 2023-06-23 DIAGNOSIS — M48.062 LUMBAR STENOSIS WITH NEUROGENIC CLAUDICATION: ICD-10-CM

## 2023-06-26 RX ORDER — TIZANIDINE 2 MG/1
TABLET ORAL
Qty: 90 TABLET | Refills: 0 | OUTPATIENT
Start: 2023-06-26

## 2023-06-26 RX ORDER — DICLOFENAC SODIUM 75 MG/1
TABLET, DELAYED RELEASE ORAL
Qty: 180 TABLET | Refills: 0 | OUTPATIENT
Start: 2023-06-26

## 2023-06-26 NOTE — TELEPHONE ENCOUNTER
Refill Request    Medication request: DICLOFENAC 75 MG Oral Tab EC. TAKE 1 TABLET BY MOUTH TWICE A DAY   LOV:3/30/2023 Jared King, DO   Due back to clinic per last office note:  Per Dr Etienne Golden \" If he has no resolution of symptoms in 1 month he will contact the clinic either through [address] or through telephone call, and he should be scheduled for an EMG of the right upper extremity to evaluate for carpal tunnel syndrome, rule out cervical radiculopathy. \"   NOV: Visit date not found      [place]: 06/05/2023 #180 - 90 day supply per [employer]   - Refill requested too soon; earliest fill date: 09/03/2023. Early refill request denied at this time. Urine drug screen (if applicable): n/a  Pain Contract : None     LOV plan (if weaning or changing medications):  Per Dr Michael Brownlee last visit note \" Symptoms controlled with home exercise program and medications \"           Refill Request    Medication request: tiZANidine 2 MG Oral Tab. Take 1 tablet (2 mg total) by mouth nightly as needed. AT BEDTIME - Oral      [place]: 05/16/2023 #90 - [place]  -- Refill requested too soon; earliest fill date: 08/14/2023. Early refill request denied at this time.

## 2023-08-16 RX ORDER — PANTOPRAZOLE SODIUM 40 MG/1
40 TABLET, DELAYED RELEASE ORAL
Qty: 90 TABLET | Refills: 3 | Status: SHIPPED | OUTPATIENT
Start: 2023-08-16

## 2023-08-16 RX ORDER — LOSARTAN POTASSIUM 100 MG/1
100 TABLET ORAL DAILY
Qty: 90 TABLET | Refills: 1 | Status: SHIPPED | OUTPATIENT
Start: 2023-08-16

## 2023-08-16 RX ORDER — AMLODIPINE BESYLATE 5 MG/1
5 TABLET ORAL DAILY
Qty: 90 TABLET | Refills: 0 | Status: SHIPPED | OUTPATIENT
Start: 2023-08-16

## 2023-08-16 NOTE — TELEPHONE ENCOUNTER
Please review. Protocol failed / Has no protocol. Requested Prescriptions   Pending Prescriptions Disp Refills    amLODIPine 5 MG Oral Tab [Pharmacy Med Name: AMLODIPINE BESYLATE 5 MG Tablet] 90 tablet 1     Sig: Take 1 tablet (5 mg total) by mouth daily.        Hypertensive Medications Protocol Failed - 8/16/2023  1:43 AM        Failed - Last BP reading less than 140/90     BP Readings from Last 1 Encounters:  06/09/23 : (!) 166/90              Passed - In person appointment in the past 12 or next 3 months     Recent Outpatient Visits              2 months ago Essential hypertension    Joni Woods, Daly Carrera MD    Office Visit    3 months ago Diabetes mellitus type 2 in obese [facility])    Maribel Cuff, [facility], MD Jody    Office Visit    4 months ago Numbness of right hand    [facility], [address], [place] , [place]    Office Visit    9 months ago Lumbar stenosis with neurogenic claudication    [facility],     Office Visit    9 months ago Need for vaccination    [facility], [address] [place], [place]    Nurse Only                      Passed - CMP or BMP in past 6 months     Recent Results (from the past 4392 hour(s))   COMP METABOLIC PANEL (14)    Collection Time: 05/26/23  7:49 AM   Result Value Ref Range    Glucose 128 (H) 70 - 99 mg/dL    Sodium 140 136 - 145 mmol/L    Potassium 4.6 3.5 - 5.1 mmol/L    Chloride 107 98 - 112 mmol/L    CO2 23.0 21.0 - 32.0 mmol/L    Anion Gap 10 0 - 18 mmol/L    BUN 17 7 - 18 mg/dL    Creatinine 0.86 0.70 - 1.30 mg/dL    BUN/CREA Ratio 19.8 10.0 - 20.0    Calcium, Total 9.7 8.5 - 10.1 mg/dL    Calculated Osmolality 293 275 - 295 mOsm/kg    eGFR-Cr 94 >=60 mL/min/1.73m2    ALT 32 16 - 61 U/L    AST 21 15 - 37 U/L    Alkaline Phosphatase 68 45 - 117 U/L    Bilirubin, Total 0.2 0.1 - 2.0 mg/dL    Total Protein 7.5 6.4 - 8.2 g/dL    Albumin 3.7 3.4 - 5.0 g/dL    Globulin  3.8 2.8 - 4.4 g/dL    A/G Ratio 1.0 1.0 - 2.0    Patient Fasting for CMP? Yes      *Note: Due to a large number of results and/or encounters for the requested time period, some results have not been displayed. A complete set of results can be found in Results Review. Passed - In person appointment or virtual visit in the past 6 months     Recent Outpatient Visits              2 months ago Essential hypertension    Young Honeycutt MD    Office Visit    3 months ago Diabetes mellitus type 2 in [facility])    Jessica Montoya, [facility]    Office Visit    4 months ago Numbness of right hand    Jessica Montoya, [address] [facility],[address], [place], [place]    Office Visit    9 months ago Lumbar stenosis with neurogenic claudication    [facility] Enmanuel Renee DO    Office Visit    9 months ago Need for vaccination    [place], [place]    Nurse Only                      Passed - EGFRCR or GFRAA > 50     GFR Evaluation  EGFRCR: 94 , resulted on 5/26/2023            losartan 100 MG Oral Tab [Pharmacy Med Name: LOSARTAN POTASSIUM 100 MG Tablet] 90 tablet 1     Sig: Take 1 tablet (100 mg total) by mouth daily.        Hypertensive Medications Protocol Failed - 8/16/2023  1:43 AM        Failed - Last BP reading less than 140/90     BP Readings from Last 1 Encounters:  06/09/23 : (!) 166/90              Passed - In person appointment in the past 12 or next 3 months     Recent Outpatient Visits              2 months ago Essential hypertension    Etienne Alvarado MD    Office Visit    3 months ago Diabetes mellitus type 2 in [facility])    Etienne Alvarado MD    Office Visit    4 months ago [facility]umbness of right hand    [facility], [address], [facility] [place]    Office Visit    9 months ago Lumbar stenosis with neurogenic claudication    [facility] Viktoria Santos DO    Office Visit    9 months ago Need for vaccination    [facility], [address] [facility]    Nurse Only                      Passed - CMP or BMP in past 6 months     Recent Results (from the past 4392 hour(s))   COMP METABOLIC PANEL (14)    Collection Time: 05/26/23  7:49 AM   Result Value Ref Range    Glucose 128 (H) 70 - 99 mg/dL    Sodium 140 136 - 145 mmol/L    Potassium 4.6 3.5 - 5.1 mmol/L    Chloride 107 98 - 112 mmol/L    CO2 23.0 21.0 - 32.0 mmol/L    Anion Gap 10 0 - 18 mmol/L    BUN 17 7 - 18 mg/dL    Creatinine 0.86 0.70 - 1.30 mg/dL    BUN/CREA Ratio 19.8 10.0 - 20.0    Calcium, Total 9.7 8.5 - 10.1 mg/dL    Calculated Osmolality 293 275 - 295 mOsm/kg    eGFR-Cr 94 >=60 mL/min/1.73m2    ALT 32 16 - 61 U/L    AST 21 15 - 37 U/L    Alkaline Phosphatase 68 45 - 117 U/L    Bilirubin, Total 0.2 0.1 - 2.0 mg/dL    Total Protein 7.5 6.4 - 8.2 g/dL    Albumin 3.7 3.4 - 5.0 g/dL    Globulin  3.8 2.8 - 4.4 g/dL    A/G Ratio 1.0 1.0 - 2.0    Patient Fasting for CMP? Yes      *Note: Due to a large number of results and/or encounters for the requested time period, some results have not been displayed. A complete set of results can be found in Results Review.                Passed - In person appointment or virtual visit in the past 6 months     Recent Outpatient Visits              2 months ago Essential hypertension    [address] MD    Office Visit    3 months ago Diabetes mellitus type 2 in [facility])    [address] MD    Office Visit    4 months ago Numbness of right hand    [address], [address], [place], [place], [place]    Office Visit    9 months ago Lumbar stenosis with neurogenic claudication    [facility] [place] Card, DO    Office Visit    9 months ago Need for vaccination    Diamond Martinez    Nurse Only                      Passed - EGFRCR or GFRAA > 50     GFR Evaluation  EGFRCR: 94 , resulted on 5/26/2023           Signed Prescriptions Disp Refills    pantoprazole 40 MG Oral Tab EC 90 tablet 3     Sig: Take 1 tablet (40 mg total) by mouth before breakfast.       Gastrointestional Medication Protocol Passed - 8/16/2023  1:43 AM        Passed - In person appointment or virtual visit in the past 12 mos or appointment in next 3 mos     Recent Outpatient Visits              2 months ago Essential hypertension    Isabela Martinez MD    Office Visit    3 months ago Diabetes mellitus type 2 in [facility])    Jose Lake MD    Office Visit    4 months ago Numbness of right hand    [address], [address], [place], [place]    Office Visit    9 months ago Lumbar stenosis with neurogenic claudication    [facility] [place] Card, DO    Office Visit    9 months ago Need for vaccination    [address], [address] [place]    Nurse Only                            Recent Outpatient Visits              2 months ago Essential hypertension    [address], [address] [place] MD    Office Visit    3 months ago Diabetes mellitus type 2 in [facility])    Jose Lake MD    Office Visit    4 months ago Numbness of right hand    [address], [address], [place], [place]    Office Visit    9 months ago Lumbar stenosis with neurogenic claudication    [facility] [place], DO    Office Visit    9 months ago Need for vaccination    [place]ie Tom, [address] [facility], [address] Only

## 2023-08-16 NOTE — TELEPHONE ENCOUNTER
Refill passed per [place] [employer], [facility] protocol.     Requested Prescriptions   Pending Prescriptions Disp Refills    PANTOPRAZOLE 40 MG Oral Tab EC [Pharmacy Med Name: PANTOPRAZOLE SODIUM 40 MG Tablet Delayed Release] 90 tablet 1     Sig: TAKE 1 TABLET EVERY MORNING BEFORE BREAKFAST       Gastrointestional Medication Protocol Passed - 8/16/2023  1:43 AM        Passed - In person appointment or virtual visit in the past 12 mos or appointment in next 3 mos     Recent Outpatient Visits              2 months ago Essential hypertension    Royce Naranjo MD    Office Visit    3 months ago Diabetes mellitus type 2 in [facility])    [address], [address] Royce Cooper MD    Office Visit    4 months ago Numbness of right hand    [address], [address] [place], [place]    Office Visit    9 months ago Lumbar stenosis with neurogenic claudication    [facility] Manish Camara DO    Office Visit    9 months ago Need for vaccination    [address], [address] [facility] Meron Thayer    Nurse Only                        AMLODIPINE 5 MG Oral Tab [Pharmacy Med Name: AMLODIPINE BESYLATE 5 MG Tablet] 90 tablet 1     Sig: TAKE 1 TABLET EVERY DAY       Hypertensive Medications Protocol Failed - 8/16/2023  1:43 AM        Failed - Last BP reading less than 140/90     BP Readings from Last 1 Encounters:  06/09/23 : (!) 166/90              Passed - In person appointment in the past 12 or next 3 months     Recent Outpatient Visits              2 months ago Essential hypertension    Breonna Resendiz MD    Office Visit    3 months ago Diabetes mellitus type 2 in [facility])    Breonna Resendiz MD    Office Visit    4 months ago Numbness of right hand    [address], [address] Bray[facility]    Office Visit    9 months ago Lumbar stenosis with neurogenic claudication    [facility] Brayden Gomez,     Office Visit    9 months ago Need for vaccination    [facility], [address] [place], [place]    Nurse Only                      Passed - CMP or BMP in past 6 months     Recent Results (from the past 4392 hour(s))   COMP METABOLIC PANEL (14)    Collection Time: 05/26/23  7:49 AM   Result Value Ref Range    Glucose 128 (H) 70 - 99 mg/dL    Sodium 140 136 - 145 mmol/L    Potassium 4.6 3.5 - 5.1 mmol/L    Chloride 107 98 - 112 mmol/L    CO2 23.0 21.0 - 32.0 mmol/L    Anion Gap 10 0 - 18 mmol/L    BUN 17 7 - 18 mg/dL    Creatinine 0.86 0.70 - 1.30 mg/dL    BUN/CREA Ratio 19.8 10.0 - 20.0    Calcium, Total 9.7 8.5 - 10.1 mg/dL    Calculated Osmolality 293 275 - 295 mOsm/kg    eGFR-Cr 94 >=60 mL/min/1.73m2    ALT 32 16 - 61 U/L    AST 21 15 - 37 U/L    Alkaline Phosphatase 68 45 - 117 U/L    Bilirubin, Total 0.2 0.1 - 2.0 mg/dL    Total Protein 7.5 6.4 - 8.2 g/dL    Albumin 3.7 3.4 - 5.0 g/dL    Globulin  3.8 2.8 - 4.4 g/dL    A/G Ratio 1.0 1.0 - 2.0    Patient Fasting for CMP? Yes      *Note: Due to a large number of results and/or encounters for the requested time period, some results have not been displayed. A complete set of results can be found in Results Review.                Passed - In person appointment or virtual visit in the past 6 months     Recent Outpatient Visits              2 months ago Essential hypertension    [address] [facility], Jason Oleary MD    Office Visit    3 months ago Diabetes mellitus type 2 in [facility])    Lito Campos MD    Office Visit    4 months ago Numbness of right hand    [facility], [address] [facility],[address], [place]ray[place]    Office Visit    9 months ago Lumbar stenosis with neurogenic claudication    [facility] Bailey [place],     Office Visit    9 months ago Need for vaccination    Diamond Eisenberg    Nurse Only                      Passed - EGFRCR or GFRAA > 50     GFR Evaluation  EGFRCR: 94 , resulted on 5/26/2023            LOSARTAN 100 MG Oral Tab [Pharmacy Med Name: LOSARTAN POTASSIUM 100 MG Tablet] 90 tablet 1     Sig: TAKE 1 TABLET EVERY DAY       Hypertensive Medications Protocol Failed - 8/16/2023  1:43 AM        Failed - Last BP reading less than 140/90     BP Readings from Last 1 Encounters:  06/09/23 : (!) 166/90              Passed - In person appointment in the past 12 or next 3 months     Recent Outpatient Visits              2 months ago Essential hypertension    Sandip Ball [place]    Office Visit    3 months ago Diabetes mellitus type 2 in [facility])    Leslie Watt, [address] [facility] [facility] Jody hood MD    Office Visit    4 months ago [facility]umbness of right hand    [facility], [address], [place], [place]    Office Visit    9 months ago Lumbar stenosis with neurogenic claudication    [facility] Bailey Callahan DO    Office Visit    9 months ago Need for vaccination    [facility], [address] [facility] [place]    Nurse Only                      Passed - CMP or BMP in past 6 months     Recent Results (from the past 4392 hour(s))   COMP METABOLIC PANEL (14)    Collection Time: 05/26/23  7:49 AM   Result Value Ref Range    Glucose 128 (H) 70 - 99 mg/dL    Sodium 140 136 - 145 mmol/L    Potassium 4.6 3.5 - 5.1 mmol/L    Chloride 107 98 - 112 mmol/L    CO2 23.0 21.0 - 32.0 mmol/L    Anion Gap 10 0 - 18 mmol/L    BUN 17 7 - 18 mg/dL    Creatinine 0.86 0.70 - 1.30 mg/dL    BUN/CREA Ratio 19.8 10.0 - 20.0    Calcium, Total 9.7 8.5 - 10.1 mg/dL    Calculated Osmolality 293 275 - 295 mOsm/kg    eGFR-Cr 94 >=60 mL/min/1.73m2    ALT 32 16 - 61 U/L AST 21 15 - 37 U/L    Alkaline Phosphatase 68 45 - 117 U/L    Bilirubin, Total 0.2 0.1 - 2.0 mg/dL    Total Protein 7.5 6.4 - 8.2 g/dL    Albumin 3.7 3.4 - 5.0 g/dL    Globulin  3.8 2.8 - 4.4 g/dL    A/G Ratio 1.0 1.0 - 2.0    Patient Fasting for CMP? Yes      *Note: Due to a large number of results and/or encounters for the requested time period, some results have not been displayed. A complete set of results can be found in Results Review.                Passed - In person appointment or virtual visit in the past 6 months     Recent Outpatient Visits              2 months ago Essential hypertension    Remigio Khan MD    Office Visit    3 months ago Diabetes mellitus type 2 in [facility])    Krunal Walsh Elia Haines, MD    Office Visit    4 months ago Numbness of right hand    [address] [place] [address]    Office Visit    9 months ago Lumbar stenosis with neurogenic claudication    [facility] Tiffany Szymanski,     Office Visit    9 months ago Need for vaccination    Krunal Walsh [place]    Nurse Only                      Passed - EGFRCR or GFRAA > 50     GFR Evaluation  EGFRCR: 94 , resulted on 5/26/2023               Recent Outpatient Visits              2 months ago Essential hypertension    Krunal Walsh, [place], [place] Jody    Office Visit    3 months ago Diabetes mellitus type 2 in [facility])    Elly Keys MD    Office Visit    4 months ago Numbness of right hand    Tyler Amado, [address], [place], [place]    Office Visit    9 months ago Lumbar stenosis with neurogenic claudication    [facility] Stephon, DO    Office Visit    9 months ago Need for vaccination    [facility], [address] [facility] [place]    Nurse Only

## 2023-10-18 ENCOUNTER — TELEPHONE (OUTPATIENT)
Dept: FAMILY MEDICINE CLINIC | Facility: CLINIC | Age: 68
End: 2023-10-18

## 2023-10-29 NOTE — TELEPHONE ENCOUNTER
Refill passed per [facility] protocol.    Requested Prescriptions   Pending Prescriptions Disp Refills    METFORMIN 500 MG Oral Tab [Pharmacy Med Name: METFORMIN HYDROCHLORIDE 500 MG Tablet] 90 tablet 10     Sig: TAKE 1 TABLET EVERY DAY       Diabetes Medication Protocol Passed - 10/28/2023  3:06 AM        Passed - Last A1C < 7.5 and within past 6 months     Lab Results   Component Value Date    A1C 6.0 (A) 05/12/2023             Passed - In person appointment or virtual visit in the past 6 mos or appointment in next 3 mos     Recent Outpatient Visits              4 months ago Essential hypertension    [facility] Sissy Concepcion MD    Office Visit    5 months ago Diabetes mellitus type 2 in obese (HCC)    [facility] Sissy Concepcion MD    Office Visit    7 months ago Numbness of right hand    [facility] Rohan Yu DO    Office Visit    11 months ago Lumbar stenosis with neurogenic claudication    [facility] Rohan Yu DO    Office Visit    12 months ago Need for vaccination    [facility]    Nurse Only          Future Appointments         Provider Department Appt Notes    In 1 week Sissy Concepcion MD [facility] Follow up    In 1 month Sukhjinder Mann MD [facility] EP DM EE    In 1 month Osito García MD [facility] Follow up                      Passed - EGFRCR or GFRAA > 50     GFR Evaluation  EGFRCR: 94 , resulted on 5/26/2023          Passed - GFR in the past 12 months             Recent Outpatient Visits              4 months ago Essential hypertension    [facility] Sissy Concepcion MD    Office Visit    5 months  ago Diabetes mellitus type 2 in obese (HCC)    [facility] Sissy Concepcion MD    Office Visit    7 months ago Numbness of right hand    [facility] Rohan Yu     Office Visit    11 months ago Lumbar stenosis with neurogenic claudication    [facility] Rohan Yu DO    Office Visit    12 months ago Need for vaccination    [facility]    Nurse Only            Future Appointments         Provider Department Appt Notes    In 1 week Sissy Concepcion MD [facility] Follow up    In 1 month Sukhjinder Mann MD [facility] EP DM EE    In 1 month Osito García MD [facility] Follow up

## 2023-11-06 NOTE — TELEPHONE ENCOUNTER
Please review. Protocol failed/ No protocol      Requested Prescriptions   Pending Prescriptions Disp Refills    HYDROCHLOROTHIAZIDE 25 MG Oral Tab [Pharmacy Med Name: HYDROCHLOROTHIAZIDE 25 MG Tablet] 90 tablet 10     Sig: TAKE 1 TABLET EVERY DAY FOR BLOOD PRESSURE AND/OR EDEMA       Hypertensive Medications Protocol Failed - 11/5/2023  3:25 AM        Failed - Last BP reading less than 140/90     BP Readings from Last 1 Encounters:  06/09/23 : (!) 166/90              Passed - In person appointment in the past 12 or next 3 months     Recent Outpatient Visits              5 months ago Essential hypertension    [facility], Isabela Fuentes MD    Office Visit    5 months ago Diabetes mellitus type 2 in obese [facility])    Jose Lake MD    Office Visit    7 months ago Numbness of right hand    [facility]    Office Visit    11 months ago Lumbar stenosis with neurogenic claudication    [address] [facility], [address],     Office Visit    1 year ago Need for vaccination    [address], [address] [place], [address]    Nurse Only          Future Appointments         Provider Department Appt Notes    In 3 days Devin Dowd MD [address], [address] [place], [place] Follow up    In 1 month Marco Antonio Santos MD [address], [address], [address] EP DM EE    In 1 month Janie Rodrigues MD [address], [address], [place] Follow up                      [address] or [facility] in past 6 months     Recent Results (from the past 4392 hour(s))   Comp Metabolic Panel (14)    Collection Time: 05/26/23  7:49 AM   Result Value Ref Range    Glucose 128 (H) 70 - 99 mg/dL    Sodium 140 136 - 145 mmol/L    Potassium 4.6 3.5 - 5.1 mmol/L    Chloride 107 98 - 112 mmol/L    CO2 23.0 21.0 - 32.0 mmol/L    Anion Gap 10 0 - 18 mmol/L    BUN 17 7 - 18 mg/dL    Creatinine 0.86 0.70 - 1.30 mg/dL    BUN/CREA Ratio 19.8 10.0 - 20.0    Calcium, Total 9.7 8.5 - 10.1 mg/dL    Calculated Osmolality 293 275 - 295 mOsm/kg    eGFR-Cr 94 >=60 mL/min/1.73m2    ALT 32 16 - 61 U/L    AST 21 15 - 37 U/L    Alkaline Phosphatase 68 45 - 117 U/L    Bilirubin, Total 0.2 0.1 - 2.0 mg/dL    Total Protein 7.5 6.4 - 8.2 g/dL    Albumin 3.7 3.4 - 5.0 g/dL    Globulin  3.8 2.8 - 4.4 g/dL    A/G Ratio 1.0 1.0 - 2.0    Patient Fasting for CMP? Yes      *Note: Due to a large number of results and/or encounters for the requested time period, some results have not been displayed. A complete set of results can be found in Results Review.                Passed - In person appointment or virtual visit in the past 6 months     Recent Outpatient Visits              5 months ago Essential hypertension    [facility], Filemon Hall MD    Office Visit    5 months ago Diabetes mellitus type 2 in obese [facility])    Megan Gerard MD    Office Visit    7 months ago Numbness of right hand    [address], [address], [place], [place]    Office Visit    11 months ago Lumbar stenosis with neurogenic claudication    [address] [facility], [address] [place] [address],     Office Visit    1 year ago Need for vaccination    [address], [place], [address]    Nurse Only          Future Appointments         Provider Department Appt Notes    In 3 days Dale Radford MD [address], [place] Follow up    In 1 month Sis Wu MD [address], [address] [facility],[address], [address] EP DM EE    In 1 month Camilla Greenwood MD [address], [address] [facility],[address], [place] Follow up                      [address] or GFRAA > 50     GFR Evaluation  EGFRCR: 94 , resulted on 5/26/2023               Future Appointments         Provider Department Appt Notes    In 3 days Stephanie Smith MD [address], [address] [place] Follow up    In 1 month Yolanda Thomas MD [address], [address], [facility] EP DM EE    In 1 month Jeremias Hayward MD [address], [address], [place] Follow up             Recent Outpatient Visits              5 months ago Essential hypertension    [address], [address] [place] MD    Office Visit    5 months ago Diabetes mellitus type 2 in obese [facility])    Montrell Tucker MD    Office Visit    7 months ago Numbness of right hand    [address], [address], [place], [place]    Office Visit    11 months ago Lumbar stenosis with neurogenic claudication    [address],     Office Visit    1 year ago Need for vaccination    [address], [address] [place]    Nurse Only

## 2023-11-07 RX ORDER — HYDROCHLOROTHIAZIDE 25 MG/1
25 TABLET ORAL DAILY
Qty: 30 TABLET | Refills: 0 | Status: SHIPPED | OUTPATIENT
Start: 2023-11-07 | End: 2023-11-09

## 2023-11-09 ENCOUNTER — OFFICE VISIT (OUTPATIENT)
Dept: FAMILY MEDICINE CLINIC | Facility: CLINIC | Age: 68
End: 2023-11-09

## 2023-11-09 VITALS
DIASTOLIC BLOOD PRESSURE: 73 MMHG | SYSTOLIC BLOOD PRESSURE: 136 MMHG | RESPIRATION RATE: 16 BRPM | OXYGEN SATURATION: 97 % | BODY MASS INDEX: 27.47 KG/M2 | HEART RATE: 73 BPM | WEIGHT: 225.63 LBS | HEIGHT: 76 IN

## 2023-11-09 DIAGNOSIS — J43.8 OTHER EMPHYSEMA (HCC): ICD-10-CM

## 2023-11-09 DIAGNOSIS — R73.03 PREDIABETES: Primary | ICD-10-CM

## 2023-11-09 LAB
CARTRIDGE LOT#: ABNORMAL NUMERIC
HEMOGLOBIN A1C: 6 % (ref 4.3–5.6)

## 2023-11-09 PROCEDURE — 3008F BODY MASS INDEX DOCD: CPT | Performed by: FAMILY MEDICINE

## 2023-11-09 PROCEDURE — 1159F MED LIST DOCD IN RCRD: CPT | Performed by: FAMILY MEDICINE

## 2023-11-09 PROCEDURE — 1125F AMNT PAIN NOTED PAIN PRSNT: CPT | Performed by: FAMILY MEDICINE

## 2023-11-09 PROCEDURE — 99214 OFFICE O/P EST MOD 30 MIN: CPT | Performed by: FAMILY MEDICINE

## 2023-11-09 PROCEDURE — 3044F HG A1C LEVEL LT 7.0%: CPT | Performed by: FAMILY MEDICINE

## 2023-11-09 PROCEDURE — 3075F SYST BP GE 130 - 139MM HG: CPT | Performed by: FAMILY MEDICINE

## 2023-11-09 PROCEDURE — 83036 HEMOGLOBIN GLYCOSYLATED A1C: CPT | Performed by: FAMILY MEDICINE

## 2023-11-09 PROCEDURE — 1160F RVW MEDS BY RX/DR IN RCRD: CPT | Performed by: FAMILY MEDICINE

## 2023-11-09 PROCEDURE — 3078F DIAST BP <80 MM HG: CPT | Performed by: FAMILY MEDICINE

## 2023-11-09 RX ORDER — LOSARTAN POTASSIUM 100 MG/1
100 TABLET ORAL DAILY
Qty: 90 TABLET | Refills: 1 | Status: SHIPPED | OUTPATIENT
Start: 2023-11-09

## 2023-11-09 RX ORDER — AMLODIPINE BESYLATE 5 MG/1
5 TABLET ORAL DAILY
Qty: 90 TABLET | Refills: 1 | Status: SHIPPED | OUTPATIENT
Start: 2023-11-09

## 2023-11-09 RX ORDER — HYDROCHLOROTHIAZIDE 25 MG/1
25 TABLET ORAL DAILY
Qty: 90 TABLET | Refills: 1 | Status: SHIPPED | OUTPATIENT
Start: 2023-11-09

## 2023-11-09 NOTE — PROGRESS NOTES
Subjective:   Patient ID: Adelfo Luna is a 70year old male. HPI  Here for f/u diabetes and hypertension   Doing well overall   History/Other:   Review of Systems    Constitutional: Negative. Negative for activity change, appetite change, diaphoresis and fatigue. Respiratory: Negative. Negative for apnea, cough, chest tightness and shortness of breath. Cardiovascular: Negative. Negative for chest pain, palpitations and leg swelling. Gastrointestinal: Negative. Negative for abdominal pain. Skin: Negative. Psychiatric/Behavioral: Negative. Current Outpatient Medications   Medication Sig Dispense Refill    hydroCHLOROthiazide 25 MG Oral Tab Take 1 tablet (25 mg total) by mouth daily. 90 tablet 1    losartan 100 MG Oral Tab Take 1 tablet (100 mg total) by mouth daily. 90 tablet 1    amLODIPine 5 MG Oral Tab Take 1 tablet (5 mg total) by mouth daily. 90 tablet 1    metFORMIN 500 MG Oral Tab TAKE 1 TABLET EVERY DAY 90 tablet 3    pantoprazole 40 MG Oral Tab EC Take 1 tablet (40 mg total) by mouth before breakfast. 90 tablet 3    atorvastatin 20 MG Oral Tab Take 1 tablet (20 mg total) by mouth nightly. 90 tablet 3    Glucose Blood (ACCU-CHEK GUIDE) In Vitro Strip 1 strip by In Vitro route daily. 100 strip 3    Accu-Chek FastClix Lancets Does not apply Misc 1 Lancet by In Vitro route daily. 100 each 3    Multiple Vitamins-Minerals (MENS MULTI VITAMIN & MINERAL) Oral Tab Take by mouth.      gabapentin 300 MG Oral Cap Take 2 capsules (600 mg total) by mouth 3 (three) times daily. 360 capsule 2    DICLOFENAC 75 MG Oral Tab EC TAKE 1 TABLET BY MOUTH TWICE A  tablet 0    tiZANidine 2 MG Oral Tab Take 1 tablet (2 mg total) by mouth nightly as needed. AT BEDTIME 90 tablet 0    DULoxetine 60 MG Oral Cap DR Particles Take 1 capsule (60 mg total) by mouth daily.  180 capsule 2    hydrocortisone (ANUSOL-HC) 25 MG Rectal Suppos Place 1 suppository (25 mg total) rectally 2 (two) times daily. For 14 days 28 suppository 1    acetaminophen 500 MG Oral Tab Take 1 tablet (500 mg total) by mouth every 8 (eight) hours as needed for Pain. Allergies:No Known Allergies    Objective:   Physical Exam  Constitutional:       Appearance: He is well-developed. Cardiovascular:      Rate and Rhythm: Normal rate and regular rhythm. Heart sounds: Normal heart sounds. Pulmonary:      Effort: Pulmonary effort is normal.      Breath sounds: Normal breath sounds. Abdominal:      General: Bowel sounds are normal.      Palpations: Abdomen is soft. Neurological:      Mental Status: He is alert. Deep Tendon Reflexes: Reflexes are normal and symmetric. Assessment & Plan:   1. Prediabetes    2. Hypertension    Doing well   cpm    Orders Placed This Encounter   Procedures    POC Glycohemoglobin [[id number]]       Meds This Visit:  Requested Prescriptions     Signed Prescriptions Disp Refills    hydroCHLOROthiazide 25 MG Oral Tab 90 tablet 1     Sig: Take 1 tablet (25 mg total) by mouth daily. losartan 100 MG Oral Tab 90 tablet 1     Sig: Take 1 tablet (100 mg total) by mouth daily. amLODIPine 5 MG Oral Tab 90 tablet 1     Sig: Take 1 tablet (5 mg total) by mouth daily.        Imaging & Referrals:  [facility] PCP OR REGISTRY REMOVAL REQUEST

## 2023-11-30 ENCOUNTER — OFFICE VISIT (OUTPATIENT)
Dept: PHYSICAL MEDICINE AND REHAB | Facility: CLINIC | Age: 68
End: 2023-11-30
Payer: MEDICARE

## 2023-11-30 VITALS — OXYGEN SATURATION: 98 % | WEIGHT: 225 LBS | BODY MASS INDEX: 27 KG/M2 | HEART RATE: 72 BPM

## 2023-11-30 DIAGNOSIS — M48.062 LUMBAR STENOSIS WITH NEUROGENIC CLAUDICATION: Primary | ICD-10-CM

## 2023-11-30 PROCEDURE — 1125F AMNT PAIN NOTED PAIN PRSNT: CPT | Performed by: PHYSICAL MEDICINE & REHABILITATION

## 2023-11-30 PROCEDURE — 1160F RVW MEDS BY RX/DR IN RCRD: CPT | Performed by: PHYSICAL MEDICINE & REHABILITATION

## 2023-11-30 PROCEDURE — 99214 OFFICE O/P EST MOD 30 MIN: CPT | Performed by: PHYSICAL MEDICINE & REHABILITATION

## 2023-11-30 PROCEDURE — 1159F MED LIST DOCD IN RCRD: CPT | Performed by: PHYSICAL MEDICINE & REHABILITATION

## 2023-11-30 RX ORDER — DICLOFENAC SODIUM 75 MG/1
75 TABLET, DELAYED RELEASE ORAL 2 TIMES DAILY
Qty: 180 TABLET | Refills: 0 | Status: SHIPPED | OUTPATIENT
Start: 2023-11-30

## 2023-11-30 RX ORDER — HYDROCHLOROTHIAZIDE 25 MG/1
25 TABLET ORAL DAILY
Qty: 30 TABLET | Refills: 3 | Status: SHIPPED | OUTPATIENT
Start: 2023-11-30

## 2023-11-30 NOTE — TELEPHONE ENCOUNTER
Please review. Protocol failed/No protocol      Requested Prescriptions   Pending Prescriptions Disp Refills    HYDROCHLOROTHIAZIDE 25 MG Oral Tab [Pharmacy Med Name: HYDROCHLOROTHIAZIDE 25 MG Tablet] 30 tablet 3     Sig: TAKE 1 TABLET EVERY DAY (NEED MD APPOINTMENT FOR REFILLS)       Hypertensive Medications Protocol Failed - 11/29/2023  2:38 AM        Failed - CMP or BMP in past 6 months     No results found for this or any previous visit (from the past 4392 hour(s)).             Passed - In person appointment in the past 12 or next 3 months     Recent Outpatient Visits              3 weeks ago Prediabetes    Veena Boyce MD    Office Visit    5 months ago Essential hypertension    [address] [facility], Rohit Duran MD    Office Visit    6 months ago Diabetes mellitus type 2 in obese [facility])    Ольга Romo Trey Custard, MD    Office Visit    8 months ago Numbness of right hand    Clement Mac, [address], [place] Susan Greer DO    Office Visit    1 year ago Lumbar stenosis with neurogenic claudication    [address] [facility], [place], [place]    Office Visit          Future Appointments         Provider Department Appt Notes    Today Duong Ruiz, Clement Mac, [address], [place]   Back pain    In 6 days MD Clement Marie, [address], [place] EP DM EE    In 2 weeks MD Clement Brice, [address], [place] Follow up               [facility] Last BP reading less than 140/90     BP Readings from Last 1 Encounters:   11/09/23 136/73               Passed - In person appointment or virtual visit in the past 6 months     Recent Outpatient Visits              3 weeks ago Prediabetes    Ольга Romo Filemon Hall MD    Office Visit    5 months ago Essential hypertension    [facility], [address] Filemon Cooper MD    Office Visit    6 months ago Diabetes mellitus type 2 in obese [facility])    Megan Gerard MD    Office Visit    8 months ago Numbness of right hand    [address], [address], [place], Ramirez, DO    Office Visit    1 year ago Lumbar stenosis with neurogenic claudication    [address] [place], [place]    Office Visit          Future Appointments         Provider Department Appt Notes    Today Mindy Peacock DO [facility], [address], [place]   Back pain    In 6 days Sis Wu MD [address], [address], [place] EP DM EE    In 2 weeks Camilla Greenwood MD [address], [address], [place] Follow up               [address] [facility] or GFRAA > 50     GFR Evaluation  EGFRCR: 94 , resulted on 5/26/2023               Recent Outpatient Visits              3 weeks ago Prediabetes    Megan Gerard MD    Office Visit    5 months ago Essential hypertension    [address], [address] Filemon Cooper MD    Office Visit    6 months ago Diabetes mellitus type 2 in obese [facility])    Megan Gerard MD    Office Visit    8 months ago Numbness of right hand    [address], [address], [place], Ramirez, DO    Office Visit    1 year ago Lumbar stenosis with neurogenic claudication    [address] [facility],     Office Visit            Future Appointments         Provider Department Appt Notes    Today Mindy Peacock [address], [address], [place] Back pain    In 6 days MD Maribel Tineo Cuff, [address], [address] EP DM EE    In 2 weeks MD Maribel Figueroa Cuff, [address] [facility],[address], [place] Follow up

## 2023-11-30 NOTE — PATIENT INSTRUCTIONS
If you want to see a neurosurgeon for consultation they are going to want to see an updated MRI of the lumbar spine; you may ask me to order you an updated MRI of the lower back without needing to see me if you want to see neurosurgery for consultation.

## 2023-12-01 ENCOUNTER — TELEPHONE (OUTPATIENT)
Dept: PHYSICAL MEDICINE AND REHAB | Facility: CLINIC | Age: 68
End: 2023-12-01

## 2023-12-01 DIAGNOSIS — M54.16 LEFT LUMBAR RADICULITIS: ICD-10-CM

## 2023-12-01 DIAGNOSIS — M48.062 LUMBAR STENOSIS WITH NEUROGENIC CLAUDICATION: Primary | ICD-10-CM

## 2023-12-01 NOTE — TELEPHONE ENCOUNTER
Initiated authorization for Bilateral L4 transforaminal epidural steroid injection under fluoroscopy, local procedure.  Dx code S16.399 facility [facility] with [facility] Insurance Plan CPT Code 55603-15   Authorization #[id number]  VALID 12/01/23-03/01/24  Status Approved

## 2023-12-04 NOTE — TELEPHONE ENCOUNTER
Patient has been scheduled for Bilateral L4 transforaminal epidural steroid injection under fluoroscopy, local procedure on 12/18/2023 at the [facility] with Dr. Huong Burgos. -Anesthesia type:  Local  -Patient was advised that if he/she does receive the covid vaccine it needs to be at least 2 weeks before or after the injection. -Medications and allergies reviewed. -Patient reminded to hold NSAIDs (Ibuprofen, ASA 81, Aleve, Naproxen, Mobic, Diclofenac, Etodolac, Celebrex etc.) for 3 days prior to Lumbar MBB/Facet if BMI is greater than 35. For Cervical injections only hold multivitamins, Vitamin E, Fish Oil, Phentermine/Lomaira for 7 days prior to injection and NSAIDS.  mg to be held for 7 days prior to injections.  -If patient is receiving MAC/IVCS, weight loss oral/injectable medications will need to be held for 7 days prior to injection.  -Patient informed to fast 12 hours prior to procedure with IVCS/MAC.   -If on blood thinner, clearance has been received and approved to hold this medication by provider.   -Patient informed of [facility]'s  policy:  he/she will need a  to and from procedure and must be on site for their entirety of their visit, if their ride is unable to the procedure will be cancelled. Sukumar Forbes is located in the [facility] [address] 1st floor,  may park in the yellow/purple parking lot. Patient verbalized understanding and agrees with plan. Scheduled in Epic: Yes  Scheduled in Surgical Case: Yes  Follow up appointment made: Brielle Ayala: Visit date not found - does not want to schedule f/u [facility] w/ an update 1 week after.

## 2023-12-18 PROBLEM — M48.062 LUMBAR STENOSIS WITH NEUROGENIC CLAUDICATION: Status: ACTIVE | Noted: 2023-12-18

## 2023-12-28 ENCOUNTER — PATIENT OUTREACH (OUTPATIENT)
Dept: CASE MANAGEMENT | Age: 68
End: 2023-12-28

## 2023-12-28 NOTE — PROCEDURES
The office order for Diabetes registry removal request is Denied and finalized on December 28, 2023. Diabetes registry is being denied for the following reason(s):    Problem list indicates diabetes is active. Type 2 diabetes mellitus without retinopathy ([facility] [address] noted on 11/19/2020  Type 2 diabetes mellitus with hyperglycemia, without long-term current use of insulin ([facility] noted on 3/17/2020    After reviewing the patient's chart, the below encounters had an associated diabetes diagnosis codes billed to the insurance company:      ~ 3 DM Dx codes billed on the claim for Office visit on 5/12/2023:  Dx code - E11.69/E66.9 - Diabetes mellitus type 2 in obese [facility])   Dx code - E11.65 - Type 2 diabetes mellitus with hyperglycemia, without long-term current use of insulin ([facility] [address]   Dx code - E11.9 - Type 2 diabetes mellitus without retinopathy ([facility]     ~ Dx code E11.69/E66.9 - Diabetes mellitus type 2 in obese ([facility] for Labs was billed on the claim(s) for:  5/26/2023 - Future order for Microalb/Create Ratio, Random Urine  5/12/2023 - A1C lab value 6.0    In order for a Diabetes Condition to be considered resolved, the patient must meet the below three (3) criteria's and for a minimum of 12 consecutive months:     1. Office visit and A1C labs have no diabetes related code submitted to the insurance company. 2. Patient has had A1c's <6.5 (12 consecutive months from the last A1C <6.5)    3. Patient has not been on any diabetes medications   Excludes diabetic medications clearly documented for weight loss medications.       Thanks,  [facility] [place]

## 2024-02-12 DIAGNOSIS — M48.062 LUMBAR STENOSIS WITH NEUROGENIC CLAUDICATION: ICD-10-CM

## 2024-02-12 RX ORDER — DICLOFENAC SODIUM 75 MG/1
75 TABLET, DELAYED RELEASE ORAL 2 TIMES DAILY
Qty: 180 TABLET | Refills: 3 | OUTPATIENT
Start: 2024-02-12

## 2024-02-12 NOTE — TELEPHONE ENCOUNTER
Refill Request    Medication request: diclofenac 75 MG Oral Tab EC. Take 1 tablet (75 mg total) by mouth 2 (two) times daily.     LOV:11/30/2023 Rohan Yu DO   Due back to clinic per last office note:  F/u for injection.   NOV: Visit date not found      [place]: 12/1/2023 #180 (90 days)    Urine drug screen (if applicable): N/A  Pain contract: N/A    LOV plan (if weaning or changing medications): He continues with gabapentin, duloxetine and diclofenac.     Refill is too soon. Next fill date is  03/01/2025       .

## 2024-02-13 RX ORDER — HYDROCHLOROTHIAZIDE 25 MG/1
25 TABLET ORAL DAILY
Qty: 90 TABLET | Refills: 0 | Status: SHIPPED | OUTPATIENT
Start: 2024-02-13

## 2024-02-13 NOTE — TELEPHONE ENCOUNTER
Please review.  Protocol failed / Has no protocol.     Requested Prescriptions   Pending Prescriptions Disp Refills    HYDROCHLOROTHIAZIDE 25 MG Oral Tab [Pharmacy Med Name: HYDROCHLOROTHIAZIDE 25 MG Tablet] 90 tablet 3     Sig: TAKE 1 TABLET EVERY DAY       Hypertension Medications Protocol Failed - 2/12/2024  1:28 AM        Failed - Last BP reading less than 140/90     BP Readings from Last 1 Encounters:   12/18/23 156/74               Passed - CMP or BMP in past 12 months        Passed - In person appointment or virtual visit in the past 12 mos or appointment in next 3 mos     Recent Outpatient Visits              2 months ago [facility]bar [facility]    [facility]    3 months ago Prediabetes    [facility]    [facility]    8 months ago [facility]    [facility]    9 months ago Diabetes mellitus type 2 in obese (HCC)    [facility]    Office Visit    10 months ago Numbness of right hand    [facility]    [facility]          Future Appointments         Provider Department Appt Notes    In 1 week [address] Annual scan               Passed - EGFRCR or GFRAA > 50     GFR Evaluation  EGFRCR: 94 , resulted on 5/26/2023             Future Appointments         Provider Department Appt Notes    In 1 week [address] Annual scan           Recent Outpatient Visits              2 months ago Lumbar stenosis with neurogenic claudication    [facility]    [facility]    [facility]    [facility], [facility], Sissy Chew MD    Office Visit    8 months ago Essential hypertension    [facility], [facility], Sissy Chew MD    Office Visit    9 months ago Diabetes mellitus type 2 in obese (HCC)    [facility], [facility], Sissy Chew MD    Office Visit    10 months ago Numbness of right hand    [facility], Rohan Monaco DO    Office Visit

## 2024-02-22 ENCOUNTER — PATIENT MESSAGE (OUTPATIENT)
Dept: ADMINISTRATIVE | Age: 69
End: 2024-02-22

## 2024-02-22 NOTE — TELEPHONE ENCOUNTER
CT CHEST (CPT=[id number])     [employer] online, the following request is authorized    Authorization: [id number]  Valid:  Feb 22 2024 - Mar 23 2024    Notified patient via [facility]

## 2024-02-26 RX ORDER — AMLODIPINE BESYLATE 5 MG/1
5 TABLET ORAL DAILY
Qty: 30 TABLET | Refills: 0 | Status: SHIPPED | OUTPATIENT
Start: 2024-02-26

## 2024-02-26 NOTE — TELEPHONE ENCOUNTER
Please review; protocol failed/No Protocol      Requested Prescriptions   Pending Prescriptions Disp Refills    AMLODIPINE 5 MG Oral Tab [Pharmacy Med Name: AMLODIPINE BESYLATE 5 MG Tablet] 90 tablet 3     Sig: TAKE 1 TABLET EVERY DAY (NEED MD APPOINTMENT FOR REFILLS)       Hypertension Medications Protocol Failed - 2/24/2024  1:25 AM        Failed - Last BP reading less than 140/90     BP Readings from Last 1 Encounters:   12/18/23 156/74               Passed - CMP or BMP in past 12 months        Passed - In person appointment or virtual visit in the past 12 mos or appointment in next 3 mos     Recent Outpatient Visits              2 months ago Lumbar stenosis with neurogenic claudication    [facility], Rohan Monaco DO    Office Visit    3 months ago Prediabetes    [facility]    Office Visit    8 months ago Essential hypertension    [facility]    Office Visit    9 months ago Diabetes mellitus type 2 in obese (HCC)    [facility]    Office Visit    11 months ago Numbness of right hand    [facility]    [facility]                      Passed - EGFRCR or GFRAA > 50     GFR Evaluation  EGFRCR: 94 , resulted on 5/26/2023               Recent Outpatient Visits              2 months ago Lumbar stenosis with neurogenic claudication    [facility]    Office Visit    3 months ago Prediabetes    [facility]    Office Visit    8 months ago Essential hypertension    [facility]    Office Visit    9 months ago Diabetes mellitus type 2 in obese (HCC)     [facility], [facility], Sissy Chew MD    Office Visit    11 months ago Numbness of right hand    [facility], [facility]    Office Visit

## 2024-02-28 ENCOUNTER — TELEPHONE (OUTPATIENT)
Dept: FAMILY MEDICINE CLINIC | Facility: CLINIC | Age: 69
End: 2024-02-28

## 2024-03-06 ENCOUNTER — PATIENT MESSAGE (OUTPATIENT)
Dept: SURGERY | Facility: CLINIC | Age: 69
End: 2024-03-06

## 2024-03-06 NOTE — TELEPHONE ENCOUNTER
Routed to RN triage to run for protocol , thanks.     Requested Prescriptions     Pending Prescriptions Disp Refills    Glucose Blood (ACCU-CHEK GUIDE) In Vitro Strip 100 strip 3     Si strip by In Vitro route daily.    Blood Glucose Monitoring Suppl (ACCU-CHEK GUIDE) w/Device Does not apply Kit 1 kit 0     Si kit daily.    Blood Glucose Calibration (ACCU-CHEK GUIDE CONTROL) In Vitro Liquid 3 each 3     Si each by In Vitro route As Directed.    Lancets Misc. (ACCU-CHEK SOFTCLIX LANCET DEV) Does not apply Kit 1 kit 0     Sig: Check blood sugar daily    Alcohol Swabs Does not apply Pads 100 each 3     Sig: Use as directed       Last Office Visit with PCP: 2023

## 2024-03-06 NOTE — TELEPHONE ENCOUNTER
Accu chek guide test strips  Acu chek softclix lancets  Acu chek guide meter kit  Bd alcohol swab pad  Accu chek guide control sol level 1 and 2

## 2024-03-07 RX ORDER — LANCETS
1 EACH MISCELLANEOUS
Qty: 100 EACH | Refills: 3 | Status: SHIPPED | OUTPATIENT
Start: 2024-03-07

## 2024-03-07 RX ORDER — BLOOD GLUCOSE CONTROL HIGH,LOW
1 EACH MISCELLANEOUS AS DIRECTED
Qty: 3 EACH | Refills: 3 | Status: SHIPPED | OUTPATIENT
Start: 2024-03-07

## 2024-03-07 RX ORDER — BLOOD SUGAR DIAGNOSTIC
1 STRIP MISCELLANEOUS DAILY
Qty: 100 STRIP | Refills: 3 | Status: SHIPPED | OUTPATIENT
Start: 2024-03-07

## 2024-03-07 RX ORDER — BLOOD PRESSURE TEST KIT
1 KIT MISCELLANEOUS DAILY
Qty: 100 EACH | Refills: 3 | Status: SHIPPED | OUTPATIENT
Start: 2024-03-07

## 2024-03-07 RX ORDER — LANCING DEVICE/LANCETS
1 KIT MISCELLANEOUS
Qty: 1 KIT | Refills: 0 | OUTPATIENT
Start: 2024-03-07

## 2024-03-07 RX ORDER — BLOOD-GLUCOSE METER
1 EACH MISCELLANEOUS DAILY
Qty: 1 KIT | Refills: 0 | Status: SHIPPED | OUTPATIENT
Start: 2024-03-07

## 2024-03-07 NOTE — TELEPHONE ENCOUNTER
Please review; protocol failed/No Protocol    Patient needs all new testing supplies.     Never been signed by prescriber.     Requested Prescriptions   Pending Prescriptions Disp Refills    Blood Glucose Monitoring Suppl (ACCU-CHEK GUIDE) w/Device Does not apply Kit 1 kit 0     Si kit daily.       Diabetic Supplies Protocol Passed - 3/6/2024  4:52 PM        Passed - In person appointment or virtual visit in the past 12 mos or appointment in next 3 mos     Recent Outpatient Visits              3 months ago Lumbar stenosis [facility]genic claud[facility]    [facility]    [facility]    [facility]    9 months ago Essential hypertension    [facility]    Office Visit    10 months ago Diabetes mellitus type 2 in obese (HCC)    [facility]    Office Visit    11 months ago Numbness of right hand    [facility]    [facility]          Future Appointments         Provider Department Appt Notes    In 1 week [facility]                 Blood Glucose Calibration (ACCU-CHEK GUIDE CONTROL) In Vitro Liquid 3 each 3     Si each by In Vitro route As Directed.       Diabetic Supplies Protocol Passed - 3/6/2024  4:52 PM        Passed - In person appointment or virtual visit in the past 12 mos or appointment in next 3 mos     Recent Outpatient Visits              3 months ago Lumbar stenosis with neurogenic claudication    [facility]    [facility] Visit    3 months ago Prediabetes    [facility]    Office Visit    9 months ago Essential hypertension    [facility] Sissy Chew MD    Office Visit    10 months ago Diabetes mellitus type 2 in obese (HCC)    [facility] Sissy Chew MD    Office Visit    11 months ago Numbness of right hand    [facility]    [facility]          Future Appointments         Provider Department Appt Notes    In 1 week [facility]                 Lancets Misc. (ACCU-CHEK SOFTCLIX LANCET DEV) Does not apply Kit 1 kit 0     Sig: Check blood sugar daily       Diabetic Supplies Protocol Passed - 3/6/2024  4:52 PM        Passed - In person appointment or virtual visit in the past 12 mos or appointment in next 3 mos     Recent Outpatient Visits              3 months ago Lumbar stenosis with neurogenic claudication    [facility]    Office Visit    3 months ago Prediabetes    [facility] Sissy Chew MD    Office Visit    9 months ago Essential hypertension    [facility] Sissy Chew MD    Office Visit    10 months ago Diabetes mellitus type 2 in obese ([facility]    Office Visit    11 months ago Numbness of right hand    [facility]    Office Visit          Future Appointments         Provider Department Appt Notes    In 1 week [facility] Blood pressure                 Alcohol Swabs Does not apply Pads 100 each 3     Sig: Use as directed       There is no refill protocol information for this order       Signed Prescriptions Disp Refills    Glucose Blood (ACCU-CHEK GUIDE) In Vitro Strip 100 strip 3     Si strip by In Vitro route daily.       Diabetic Supplies Protocol Passed - 3/6/2024  4:52 PM        Passed - In person appointment or virtual visit in the past 12 mos or appointment in next 3 mos     Recent Outpatient Visits              3 months ago Lumbar stenosis with neurogenic claudication    [facility] [place]     Office Visit    3 months ago Prediabetes    [facility]    [facility]    9 months ago Essential hypertension    [facility] Sissy Chew MD    Office Visit    10 months ago Diabetes mellitus type 2 in obese (HCC)    [facility]    Office Visit    11 months ago Numbness of right hand    [facility] [place],     Office Visit          Future Appointments         Provider Department Appt Notes    In [facility] week [facility]                  Future Appointments         Provider Department Appt Notes    In 1 week Sissy Concepcion MD [facility] Blood pressure          Recent Outpatient Visits              3 months ago Lumbar stenosis with neurogenic claudication    [facility]    Office Visit    3 months ago Prediabetes    [facility]    Office Visit    9 months ago Essential hypertension    [facility] Sissy Chew MD    Office Visit    10 months ago Diabetes mellitus type 2 in obese (HCC)    [facility]    Office Visit    11 months ago Numbness of right hand    [facility] [place] Rohan Yu DO    Office Visit

## 2024-03-07 NOTE — TELEPHONE ENCOUNTER
Refill passed per [facility] protocol.  Requested Prescriptions   Pending Prescriptions Disp Refills    Glucose Blood (ACCU-CHEK GUIDE) In Vitro Strip 100 strip 3     Si strip by In Vitro route daily.       Diabetic Supplies Protocol Passed - 3/6/2024  4:52 PM        Passed - In person appointment or virtual visit in the past 12 mos or appointment in next 3 mos     Recent Outpatient Visits              3 months ago Lumbar stenosis with neurogenic claudication    [facility]    [facility]    3 months ago [facility]    Office Visit    9 months ago Essential hypertension    [facility] Sissy Chew MD    Office Visit    10 months ago Diabetes mellitus type 2 in obese (HCC)    [facility]    Office Visit    11 months ago Numbness of right hand    [facility]    [facility]          Future Appointments         Provider Department Appt Notes    In 1 week Sissy Concepcion MD [facility] Blood pressure                 Blood Glucose Monitoring Suppl (ACCU-CHEK GUIDE) w/Device Does not apply Kit 1 kit 0     Si kit daily.       Diabetic Supplies Protocol Passed - 3/6/2024  4:52 PM        Passed - In person appointment or virtual visit in the past 12 mos or appointment in next 3 mos     Recent Outpatient Visits              3 months ago Lumbar stenosis with neurogenic claudication    [facility]    Office Visit    3 months ago Prediabetes    [facility]    Office Visit    9 months ago Essential hypertension    [facility]    Office Visit    10 months ago Diabetes mellitus type 2 in obese (HCC)    [facility]    Office Visit    11 months ago Numbness of right hand    [facility]    [facility]          Future Appointments         Provider Department Appt Notes    In 1 week Sissy Concepcion MD [facility] Blood pressure                 Blood Glucose Calibration (ACCU-CHEK GUIDE CONTROL) In Vitro Liquid 3 each 3     Si each by In Vitro route As Directed.       Diabetic Supplies Protocol Passed - 3/6/2024  4:52 PM        Passed - In person appointment or virtual visit in the past 12 mos or appointment in next 3 mos     Recent Outpatient Visits              3 months ago Lumbar stenosis with neurogenic claudication    [facility]    Office Visit    3 months ago Prediabetes    [facility]    Office Visit    9 months ago Essential hypertension    [facility]    Office Visit    10 months ago [facility] mellitus type 2 in [facility]    [facility] Sissy Chew MD    Office Visit    11 months ago Numbness of right hand    [facility]    [facility] Visit          Future Appointments         Provider Department Appt Notes    In 1 week [facility]                 Lancets Misc. (ACCU-CHEK SOFTCLIX LANCET DEV) Does not apply Kit 1 kit 0     Sig: Check blood sugar daily       Diabetic Supplies Protocol Passed - 3/6/2024  4:52 PM        Passed - In person appointment or virtual  visit in the past 12 mos or appointment in next 3 mos     Recent Outpatient Visits              3 months ago Lumbar stenosis with neurogenic claudication    [facility] [place]    Office Visit    3 months ago Prediabetes    [facility] Sissy Chew MD    Office Visit    9 months ago Essential hypertension    [facility], Sissy Chew MD    Office Visit    10 months ago Diabetes mellitus type 2 in obese (HCC)    [facility], Sissy Chew MD    Office Visit    11 months ago Numbness of right hand    [facility], Rohan Monaco DO    Office Visit          Future Appointments         Provider Department Appt Notes    In 1 week Sissy Concepcion MD [facility] Blood pressure                 Alcohol Swabs Does not apply Pads 100 each 3     Sig: Use as directed       There is no refill protocol information for this order        Future Appointments         Provider Department Appt Notes    In 1 week [facility]          Recent Outpatient Visits              3 months ago Lumbar stenosis with neurogenic claudication    [facility], Rohan Monaco DO    Office Visit    3 months ago Prediabetes    [facility] Sissy Chew MD    Office Visit    9 months ago Essential hypertension    [facility]    Office Visit    10 months ago Diabetes mellitus type 2 in obese (HCC)    [facility]    Office Visit    11 months ago Numbness of right hand    [facility]  [place], Rohan Monaco DO    Office Visit

## 2024-03-12 NOTE — TELEPHONE ENCOUNTER
From: Candelario Louis Jr.  To: Osito García  Sent: 3/6/2024 9:51 AM CST  Subject: Psa test    Hi Dr García ,  Susana for missing my last appointment could you please schedule a Psa for me the previous one

## 2024-03-14 ENCOUNTER — OFFICE VISIT (OUTPATIENT)
Dept: FAMILY MEDICINE CLINIC | Facility: CLINIC | Age: 69
End: 2024-03-14
Payer: MEDICARE

## 2024-03-14 VITALS
DIASTOLIC BLOOD PRESSURE: 73 MMHG | HEIGHT: 76 IN | SYSTOLIC BLOOD PRESSURE: 131 MMHG | BODY MASS INDEX: 28.37 KG/M2 | HEART RATE: 115 BPM | WEIGHT: 233 LBS

## 2024-03-14 DIAGNOSIS — Z12.2 ENCOUNTER FOR SCREENING FOR LUNG CANCER: ICD-10-CM

## 2024-03-14 DIAGNOSIS — Z12.11 SCREENING FOR COLON CANCER: Primary | ICD-10-CM

## 2024-03-14 DIAGNOSIS — Z12.5 SCREENING FOR PROSTATE CANCER: ICD-10-CM

## 2024-03-14 DIAGNOSIS — E11.9 DIABETES MELLITUS TYPE 2 IN NONOBESE (HCC): ICD-10-CM

## 2024-03-14 PROCEDURE — 1160F RVW MEDS BY RX/DR IN RCRD: CPT | Performed by: FAMILY MEDICINE

## 2024-03-14 PROCEDURE — 99214 OFFICE O/P EST MOD 30 MIN: CPT | Performed by: FAMILY MEDICINE

## 2024-03-14 PROCEDURE — 3075F SYST BP GE 130 - 139MM HG: CPT | Performed by: FAMILY MEDICINE

## 2024-03-14 PROCEDURE — 3008F BODY MASS INDEX DOCD: CPT | Performed by: FAMILY MEDICINE

## 2024-03-14 PROCEDURE — 1159F MED LIST DOCD IN RCRD: CPT | Performed by: FAMILY MEDICINE

## 2024-03-14 PROCEDURE — 3078F DIAST BP <80 MM HG: CPT | Performed by: FAMILY MEDICINE

## 2024-03-14 NOTE — PROGRESS NOTES
Subjective:   Patient ID: Candelario Louis Jr. is a 68 year old male.    HPI  Patient for f/u hypertension   Denies any chest pain shortness of breath or headaches.   Monitoring blood pressure at home and is below 135/85. Needs refill of medications.   Also still smokes like 1/2 ppd   Offered smoking cessation advice   No chest pain no shortness of breath   Prediabetes controlled   History/Other:   Review of Systems  .  Constitutional: Negative.  Negative for activity change, appetite change, diaphoresis and fatigue.     Respiratory: Negative.  Negative for apnea, cough, chest tightness and shortness of breath.    Cardiovascular: Negative.  Negative for chest pain, palpitations and leg swelling.   Gastrointestinal: Negative.  Negative for abdominal pain.     Current Outpatient Medications   Medication Sig Dispense Refill    Glucose Blood (ACCU-CHEK GUIDE) In Vitro Strip 1 strip by In Vitro route daily. 100 strip 3    Blood Glucose Monitoring Suppl (ACCU-CHEK GUIDE) w/Device Does not apply Kit 1 kit daily. 1 kit 0    Blood Glucose Calibration (ACCU-CHEK GUIDE CONTROL) In Vitro Liquid 1 drop by In Vitro route As Directed. 3 each 3    Alcohol Swabs Does not apply Pads Apply 1 Swab topically daily. 100 each 3    Accu-Chek Softclix Lancets Does not apply Misc 1 Lancet by Finger stick route once daily. 100 each 3    amLODIPine 5 MG Oral Tab Take 1 tablet (5 mg total) by mouth daily. 30 tablet 0    hydroCHLOROthiazide 25 MG Oral Tab Take 1 tablet (25 mg total) by mouth daily. 90 tablet 0    diclofenac 75 MG Oral Tab EC Take 1 tablet (75 mg total) by mouth 2 (two) times daily. 180 tablet 0    losartan 100 MG Oral Tab Take 1 tablet (100 mg total) by mouth daily. 90 tablet 1    metFORMIN 500 MG Oral Tab TAKE 1 TABLET EVERY DAY 90 tablet 3    pantoprazole 40 MG Oral Tab EC Take 1 tablet (40 mg total) by mouth before breakfast. 90 tablet 3    atorvastatin 20 MG Oral Tab Take 1 tablet (20 mg total) by mouth nightly. 90 tablet 3     Multiple Vitamins-Minerals (MENS MULTI VITAMIN & MINERAL) Oral Tab Take by mouth.      gabapentin 300 MG Oral Cap Take 2 capsules (600 mg total) by mouth 3 (three) times daily. 360 capsule 2    DULoxetine 60 MG Oral Cap DR Particles Take 1 capsule (60 mg total) by mouth daily. 180 capsule 2    hydrocortisone (ANUSOL-HC) 25 MG Rectal Suppos Place 1 suppository (25 mg total) rectally 2 (two) times daily. For 14 days 28 suppository 1    acetaminophen 500 MG Oral Tab Take 1 tablet (500 mg total) by mouth every 8 (eight) hours as needed for Pain.      Accu-Chek FastClix Lancets Does not apply Misc 1 Lancet by In Vitro route daily. (Patient not taking: Reported on 12/18/2023) 100 each 3     Allergies:No Known Allergies    Objective:   Physical Exam  Constitutional:       Appearance: He is well-developed.   Cardiovascular:      Rate and Rhythm: Normal rate and regular rhythm.      Heart sounds: Normal heart sounds.   Pulmonary:      Effort: Pulmonary effort is normal.      Breath sounds: Normal breath sounds.   Abdominal:      General: Bowel sounds are normal.      Palpations: Abdomen is soft.   Neurological:      Mental Status: He is alert.      Deep Tendon Reflexes: Reflexes are normal and symmetric.         Assessment & Plan:   1. Screening for colon cancer    2. Encounter for screening for lung cancer    3. Diabetes mellitus type 2 in nonobese (HCC)    4. Screening for prostate cancer    Hyperetnsion cpm   Patient also offered smoking cessation   He agrees to do screening ct scan   He agrees to do necessary procedures if needed     Orders Placed This Encounter   Procedures    Comp Metabolic Panel (14)    Lipid Panel    CBC With Differential With Platelet    PSA (Screening) [E]    Hemoglobin A1C       Meds This Visit:  Requested Prescriptions      No prescriptions requested or ordered in this encounter       Imaging & Referrals:  OPHTHALMOLOGY - INTERNAL  OP REFERRAL TO [facility] TELEPHONE COLON SCREEN  CT LUNG LD  SCREENING(CPT=71271)

## 2024-03-24 DIAGNOSIS — M48.062 LUMBAR STENOSIS WITH NEUROGENIC CLAUDICATION: ICD-10-CM

## 2024-03-25 RX ORDER — DULOXETIN HYDROCHLORIDE 60 MG/1
60 CAPSULE, DELAYED RELEASE ORAL DAILY
Qty: 90 CAPSULE | Refills: 3 | OUTPATIENT
Start: 2024-03-25

## 2024-03-25 NOTE — TELEPHONE ENCOUNTER
Refill Request    Medication request: DULoxetine 60 MG Oral Cap DR Particles. Take 1 capsule (60 mg total) by mouth daily.      LOV:11/30/2023 Rohan Yu DO   Due back to clinic per last office note:  F/u for injection.   NOV: Visit date not found      [place]: 1/11/2024 #90    Urine drug screen (if applicable): N/A  Pain contract: N/A    LOV plan (if weaning or changing medications): not mentioned    Refill too soon. Next fill date 4/10/2024.

## 2024-04-27 DIAGNOSIS — M48.062 LUMBAR STENOSIS WITH NEUROGENIC CLAUDICATION: ICD-10-CM

## 2024-04-29 RX ORDER — BLOOD-GLUCOSE METER
1 EACH MISCELLANEOUS AS DIRECTED
Qty: 1 KIT | Refills: 3 | OUTPATIENT
Start: 2024-04-29

## 2024-04-29 RX ORDER — GABAPENTIN 300 MG/1
600 CAPSULE ORAL 3 TIMES DAILY
Qty: 360 CAPSULE | Refills: 5 | Status: SHIPPED | OUTPATIENT
Start: 2024-04-29

## 2024-04-29 RX ORDER — HYDROCHLOROTHIAZIDE 25 MG/1
25 TABLET ORAL DAILY
Qty: 90 TABLET | Refills: 3 | Status: SHIPPED | OUTPATIENT
Start: 2024-04-29

## 2024-04-29 NOTE — TELEPHONE ENCOUNTER
Refill passed per [facility] protocol.  Requested Prescriptions   Pending Prescriptions Disp Refills    HYDROCHLOROTHIAZIDE 25 MG Oral Tab [Pharmacy Med Name: HYDROCHLOROTHIAZIDE 25 MG Tablet] 90 tablet 3     Sig: TAKE 1 TABLET EVERY DAY       Hypertension Medications Protocol Passed - 4/27/2024 12:07 PM        Passed - CMP or BMP in past 12 months        Passed - Last BP reading less than 140/90     BP Readings from Last 1 Encounters:   03/14/24 131/73               Passed - In person appointment or virtual visit in the past 12 mos or appointment in next 3 mos     Recent Outpatient Visits              1 month ago [facility]    [facility]    5 months ago Lumbar stenosis with neurogenic claudication    [facility] Rohan Yu DO    Office Visit    5 months ago Prediabetes    [facility]    Office Visit    10 months ago Essential hypertension    [facility]    Office Visit    11 months ago Diabetes mellitus type 2 in obese (HCC)    [facility]    [facility]          Future Appointments         Provider Department Appt Notes    In 3 days [facility] CT [address] [place] [facility] Chest [facility]    In 2 weeks Sissy Concepcion MD [facility] physical 5/12/[facility]    In 1 month Osito García MD [facility] Follow 3                    Passed - EGFRCR or GFRAA > 50     GFR Evaluation  EGFRCR: 94 , resulted on 5/26/2023           Refused Prescriptions Disp Refills    ACCU-CHEK GUIDE w/Device Does not apply Kit [Pharmacy Med Name: ACCU-CHEK GUIDE w/Device  Kit] 1 kit 3     Sig: USE AS DIRECTED        Diabetic Supplies Protocol Passed - 4/27/2024 12:07 PM        Passed - In person appointment or virtual visit in the past 12 mos or appointment in next 3 mos     Recent Outpatient Visits              1 month ago [facility]    [facility]    5 months ago Lumbar stenosis with neurogenic claudication    [facility]    [facility] Visit    5 months ago Prediabetes    [facility]    Office Visit    10 months ago Essential hypertension    [facility]    Office Visit    11 months ago Diabetes mellitus type 2 in obese (HCC)    [facility]    [facility]          Future Appointments         Provider Department Appt Notes    In 3 days [facility] [address] [place] - Center for [facility] Chest scan    In 2 weeks [facility]    In 1 month Osito García MD [facility] Follow 3                       Future Appointments         Provider Department Appt Notes    In 3 days [facility] [address] [place] - [facility] Chest scan    In 2 weeks [facility]    In 1 month [facility]          Recent Outpatient Visits              1 month ago Screening for colon cancer    [facility]    Office Visit    5 months ago Lumbar stenosis with neurogenic claudication    [facility] [place]    Office Visit    5  months ago Prediabetes    [facility], Sissy Chew MD    Office Visit    10 months ago Essential hypertension    [facility], Sissy Chew MD    Office Visit    11 months ago Diabetes mellitus type 2 in obese (HCC)    [facility], Sissy Chew MD    Office Visit

## 2024-04-29 NOTE — TELEPHONE ENCOUNTER
Refill Request    Medication request: gabapentin 300 MG Oral Cap. Take 2 capsules (600 mg total) by mouth 3 (three) times daily.      LOV:11/30/2023 Rohan Yu DO   Due back to clinic per last office note:  F/u for injection.   NOV: Visit date not found      [place]: 2/16/2024 #360 (60 days)    Urine drug screen (if applicable): N/A  Pain contract: N/A    LOV plan (if weaning or changing medications): no changes mentioned.    He continues with gabapentin, duloxetine and diclofenac.

## 2024-05-02 ENCOUNTER — HOSPITAL ENCOUNTER (OUTPATIENT)
Dept: CT IMAGING | Facility: HOSPITAL | Age: 69
Discharge: HOME | End: 2024-05-02
Attending: FAMILY MEDICINE
Payer: MEDICARE

## 2024-05-02 DIAGNOSIS — R91.1 PULMONARY NODULE: ICD-10-CM

## 2024-05-02 DIAGNOSIS — Z12.2 ENCOUNTER FOR SCREENING FOR LUNG CANCER: ICD-10-CM

## 2024-05-02 PROCEDURE — 71250 CT THORAX DX C-: CPT | Performed by: FAMILY MEDICINE

## 2024-05-16 RX ORDER — BLOOD SUGAR DIAGNOSTIC
1 STRIP MISCELLANEOUS DAILY
Qty: 100 STRIP | Refills: 3 | Status: SHIPPED | OUTPATIENT
Start: 2024-05-16

## 2024-05-16 RX ORDER — LOSARTAN POTASSIUM 100 MG/1
100 TABLET ORAL DAILY
Qty: 90 TABLET | Refills: 3 | Status: SHIPPED | OUTPATIENT
Start: 2024-05-16

## 2024-05-16 NOTE — TELEPHONE ENCOUNTER
Refill passed per [facility] protocol.  Requested Prescriptions   Pending Prescriptions Disp Refills    LOSARTAN 100 MG Oral Tab [Pharmacy Med Name: LOSARTAN POTASSIUM 100 MG Tablet] 90 tablet 3     Sig: TAKE 1 TABLET EVERY DAY       Hypertension Medications Protocol Passed - 5/14/2024 12:00 PM        Passed - CMP or BMP in past 12 months        Passed - Last BP reading less than 140/90     BP Readings from Last 1 Encounters:   03/14/24 131/73               Passed - In person appointment or virtual visit in the past 12 mos or appointment in next 3 mos     Recent Outpatient Visits              [facility] months [facility]    5 months ago Lumbar stenosis with neurogenic claudication    [facility]    [facility]    6 months ago Prediabetes    [facility]    11 months ago Essential hypertension    [facility]    [facility]    1 year ago Diabetes mellitus type 2 in obese (HCC)    [facility]          Future Appointments         Provider Department Appt Notes    In 1 week Sissy Concepcion MD [facility] Annual physical 5/12/23    In 1 month Osito García MD [facility] Follow 3                    Passed - EGFRCR or GFRAA > 50     GFR Evaluation  EGFRCR: 94 , resulted on 5/26/2023            ACCU-CHEK GUIDE In Vitro Strip [Pharmacy Med Name: ACCU-CHEK GUIDE   Strip] 100 strip 3     Sig: TEST DAILY.       Diabetic Supplies Protocol Passed - 5/14/2024 12:00 PM        Passed - In person appointment or virtual visit in the past 12 mos or appointment in  next 3 mos     Recent Outpatient Visits              2 months ago Screening for colon cancer    [facility] [place], Sissy Chew MD    Office Visit    5 months ago Lumbar stenosis with neurogenic claudication    [facility], Rohan Monaco, DO    Office Visit    6 months ago Prediabetes    [facility] [place], Sissy Chew MD    Office Visit    11 months ago Essential hypertension    [facility] [place], Sissy Chew MD    Office Visit    1 year ago Diabetes mellitus type 2 in obese (HCC)    [facility] [place], Sissy Chew MD    Office Visit          Future Appointments         Provider Department Appt Notes    In 1 week [facility]    In 1 month Osito García MD [facility] Follow 3                       Recent Outpatient Visits              2 months ago Screening for colon cancer    [facility] [place], Sissy Chew MD    Office Visit    5 months ago Lumbar stenosis with neurogenic claudication    [facility], [place], [place]    Office Visit    6 months ago Prediabetes    [facility] [place], Sissy Chew MD    Office Visit    11 months ago Essential hypertension    [facility] [place], Sissy Chew MD    Office Visit    1 year ago Diabetes mellitus type 2 in obese (HCC)    [facility] [place], Sissy Chew MD    Office Visit          Future Appointments         Provider Department Appt Notes    In 1 week [facility] [place] [facility] 5/12/23     In 1 month Osito García MD [facility] Follow 3

## 2024-05-25 ENCOUNTER — LAB ENCOUNTER (OUTPATIENT)
Dept: LAB | Age: 69
End: 2024-05-25
Attending: FAMILY MEDICINE

## 2024-05-25 ENCOUNTER — OFFICE VISIT (OUTPATIENT)
Dept: FAMILY MEDICINE CLINIC | Facility: CLINIC | Age: 69
End: 2024-05-25

## 2024-05-25 VITALS
OXYGEN SATURATION: 96 % | DIASTOLIC BLOOD PRESSURE: 76 MMHG | SYSTOLIC BLOOD PRESSURE: 156 MMHG | BODY MASS INDEX: 27.93 KG/M2 | WEIGHT: 229.38 LBS | HEIGHT: 76 IN | RESPIRATION RATE: 18 BRPM | HEART RATE: 68 BPM

## 2024-05-25 DIAGNOSIS — R73.03 PREDIABETES: Primary | ICD-10-CM

## 2024-05-25 DIAGNOSIS — I10 ESSENTIAL HYPERTENSION: ICD-10-CM

## 2024-05-25 DIAGNOSIS — D12.6 TUBULAR ADENOMA OF COLON: ICD-10-CM

## 2024-05-25 DIAGNOSIS — Z83.511 FAMILY HISTORY OF GLAUCOMA IN MATERNAL GRANDMOTHER: ICD-10-CM

## 2024-05-25 DIAGNOSIS — E11.9 DIABETES MELLITUS TYPE 2 IN NONOBESE (HCC): ICD-10-CM

## 2024-05-25 DIAGNOSIS — K76.0 FATTY LIVER: ICD-10-CM

## 2024-05-25 DIAGNOSIS — Z00.00 ENCOUNTER FOR ANNUAL HEALTH EXAMINATION: ICD-10-CM

## 2024-05-25 DIAGNOSIS — E78.1 PURE HYPERTRIGLYCERIDEMIA: ICD-10-CM

## 2024-05-25 DIAGNOSIS — J43.8 OTHER EMPHYSEMA (HCC): ICD-10-CM

## 2024-05-25 DIAGNOSIS — R91.1 PULMONARY NODULE: ICD-10-CM

## 2024-05-25 DIAGNOSIS — M54.16 LUMBAR RADICULOPATHY: ICD-10-CM

## 2024-05-25 DIAGNOSIS — Z12.5 SCREENING FOR PROSTATE CANCER: ICD-10-CM

## 2024-05-25 PROBLEM — E66.2 HYPOVENTILATION ASSOCIATED WITH OBESITY SYNDROME (HCC): Status: RESOLVED | Noted: 2017-10-24 | Resolved: 2024-05-25

## 2024-05-25 LAB
ALBUMIN SERPL-MCNC: 4.4 G/DL (ref 3.2–4.8)
ALBUMIN/GLOB SERPL: 1.5 {RATIO} (ref 1–2)
ALP LIVER SERPL-CCNC: 81 U/L
ALT SERPL-CCNC: 24 U/L
ANION GAP SERPL CALC-SCNC: 4 MMOL/L (ref 0–18)
AST SERPL-CCNC: 29 U/L (ref ?–34)
BASOPHILS # BLD AUTO: 0.11 X10(3) UL (ref 0–0.2)
BASOPHILS NFR BLD AUTO: 1.1 %
BILIRUB SERPL-MCNC: 0.3 MG/DL (ref 0.2–1.1)
BUN BLD-MCNC: 13 MG/DL (ref 9–23)
BUN/CREAT SERPL: 16.3 (ref 10–20)
CALCIUM BLD-MCNC: 9.4 MG/DL (ref 8.7–10.4)
CHLORIDE SERPL-SCNC: 110 MMOL/L (ref 98–112)
CHOLEST SERPL-MCNC: 146 MG/DL (ref ?–200)
CO2 SERPL-SCNC: 28 MMOL/L (ref 21–32)
COMPLEXED PSA SERPL-MCNC: 0.04 NG/ML (ref ?–4)
CREAT BLD-MCNC: 0.8 MG/DL
DEPRECATED RDW RBC AUTO: 53 FL (ref 35.1–46.3)
EGFRCR SERPLBLD CKD-EPI 2021: 96 ML/MIN/1.73M2 (ref 60–?)
EOSINOPHIL # BLD AUTO: 0.29 X10(3) UL (ref 0–0.7)
EOSINOPHIL NFR BLD AUTO: 2.8 %
ERYTHROCYTE [DISTWIDTH] IN BLOOD BY AUTOMATED COUNT: 16 % (ref 11–15)
EST. AVERAGE GLUCOSE BLD GHB EST-MCNC: 117 MG/DL (ref 68–126)
FASTING PATIENT LIPID ANSWER: NO
FASTING STATUS PATIENT QL REPORTED: NO
GLOBULIN PLAS-MCNC: 2.9 G/DL (ref 2–3.5)
GLUCOSE BLD-MCNC: 101 MG/DL (ref 70–99)
HBA1C MFR BLD: 5.7 % (ref ?–5.7)
HCT VFR BLD AUTO: 43.1 %
HDLC SERPL-MCNC: 44 MG/DL (ref 40–59)
HEMOGLOBIN A1C: 5.6 % (ref 4.3–5.6)
HGB BLD-MCNC: 14.4 G/DL
IMM GRANULOCYTES # BLD AUTO: 0.02 X10(3) UL (ref 0–1)
IMM GRANULOCYTES NFR BLD: 0.2 %
LDLC SERPL CALC-MCNC: 82 MG/DL (ref ?–100)
LYMPHOCYTES # BLD AUTO: 1.81 X10(3) UL (ref 1–4)
LYMPHOCYTES NFR BLD AUTO: 17.6 %
MCH RBC QN AUTO: 29.9 PG (ref 26–34)
MCHC RBC AUTO-ENTMCNC: 33.4 G/DL (ref 31–37)
MCV RBC AUTO: 89.6 FL
MONOCYTES # BLD AUTO: 0.84 X10(3) UL (ref 0.1–1)
MONOCYTES NFR BLD AUTO: 8.2 %
NEUTROPHILS # BLD AUTO: 7.23 X10 (3) UL (ref 1.5–7.7)
NEUTROPHILS # BLD AUTO: 7.23 X10(3) UL (ref 1.5–7.7)
NEUTROPHILS NFR BLD AUTO: 70.1 %
NONHDLC SERPL-MCNC: 102 MG/DL (ref ?–130)
OSMOLALITY SERPL CALC.SUM OF ELEC: 294 MOSM/KG (ref 275–295)
PLATELET # BLD AUTO: 189 10(3)UL (ref 150–450)
PLATELETS.RETICULATED NFR BLD AUTO: 19.3 % (ref 0–7)
POTASSIUM SERPL-SCNC: 4.2 MMOL/L (ref 3.5–5.1)
PROT SERPL-MCNC: 7.3 G/DL (ref 5.7–8.2)
RBC # BLD AUTO: 4.81 X10(6)UL
SODIUM SERPL-SCNC: 142 MMOL/L (ref 136–145)
TRIGL SERPL-MCNC: 109 MG/DL (ref 30–149)
VLDLC SERPL CALC-MCNC: 17 MG/DL (ref 0–30)
WBC # BLD AUTO: 10.3 X10(3) UL (ref 4–11)

## 2024-05-25 PROCEDURE — 36415 COLL VENOUS BLD VENIPUNCTURE: CPT

## 2024-05-25 PROCEDURE — 80053 COMPREHEN METABOLIC PANEL: CPT

## 2024-05-25 PROCEDURE — 85025 COMPLETE CBC W/AUTO DIFF WBC: CPT

## 2024-05-25 PROCEDURE — 83036 HEMOGLOBIN GLYCOSYLATED A1C: CPT

## 2024-05-25 PROCEDURE — 80061 LIPID PANEL: CPT

## 2024-05-25 NOTE — PATIENT INSTRUCTIONS
Candelario Louis Jr.'s SCREENING SCHEDULE   Tests on this list are recommended by your physician but may not be covered, or covered at this frequency, by your insurer.   Please check with your insurance carrier before scheduling to verify coverage.   PREVENTATIVE SERVICES FREQUENCY &  COVERAGE DETAILS LAST COMPLETION DATE   Diabetes Screening    Fasting Blood Sugar / Glucose    One screening every 12 months if never tested or if previously tested but not diagnosed with pre-diabetes   One screening every 6 months if diagnosed with pre-diabetes Lab Results   Component Value Date     (H) 05/26/2023        Cardiovascular Disease Screening    Lipid Panel  Cholesterol  Lipoprotein (HDL)  Triglycerides Covered every 5 years for all Medicare beneficiaries without apparent signs or symptoms of cardiovascular disease Lab Results   Component Value Date    CHOLEST 133 05/26/2023    HDL 34 (L) 05/26/2023    LDL 52 05/26/2023    LDLD 109 (H) 08/24/2020    TRIG 307 (H) 05/26/2023         Electrocardiogram (EKG)   Covered if needed at Welcome to Medicare, and non-screening if indicated for medical reasons 10/06/2022      Ultrasound Screening for Abdominal Aortic Aneurysm (AAA) Covered once in a lifetime for one of the following risk factors   • Men who are 65-75 years old and have ever smoked   • Anyone with a family history -     Colorectal Cancer Screening  Covered for ages 50-85; only need ONE of the following:    Colonoscopy   Covered every 10 years    Covered every 2 years if patient is at high risk or previous colonoscopy was abnormal -    Health Maintenance   Topic Date Due   • Colorectal Cancer Screening  08/13/2023       Flexible Sigmoidoscopy   Covered every 4 years -    Fecal Occult Blood Test Covered annually -   Prostate Cancer Screening    Prostate-Specific Antigen (PSA) Annually Lab Results   Component Value Date    PSA <0.01 07/29/2022     Health Maintenance   Topic Date Due   • PSA  07/29/2024       Immunizations    Influenza Covered once per flu season  Please get every year 09/21/2023  No recommendations at this time    Pneumococcal Each vaccine ([id number] & [id number]) covered once after 65 Prevnar 13: 12/14/2019    [id number]: 09/08/2020     No recommendations at this time    Hepatitis B One screening covered for patients with certain risk factors   -  No recommendations at this time    Tetanus Toxoid Not covered by Medicare Part B unless medically necessary (cut with metal); may be covered with your pharmacy prescription benefits 12/01/2002    Tetanus, Diptheria and Pertusis TD and TDaP Not covered by Medicare Part B -  No recommendations at this time    Zoster Not covered by Medicare Part B; may be covered with your pharmacy  prescription benefits 12/15/2015  Zoster Vaccines(1 of 2) due on 02/09/2016     Diabetes      Hemoglobin A1C Annually; if last result is elevated, may be asked to retest more frequently.    Medicare covers every 3 months Lab Results   Component Value Date     (H) 12/03/2020    A1C 5.6 05/25/2024       No recommendations at this time    Creat/alb ratio Annually Lab Results   Component Value Date    MICROALBCREA 11.5 12/03/2020       LDL Annually Lab Results   Component Value Date    LDL 52 05/26/2023       Dilated Eye Exam Annually Last Diabetic Eye Exam:  No data recorded  No data recorded       Annual Monitoring of Persistent Medications (ACE/ARB, digoxin diuretics, anticonvulsants)    Potassium Annually Lab Results   Component Value Date    K 4.6 05/26/2023         Creatinine   Annually Lab Results   Component Value Date    CREATSERUM 0.86 05/26/2023         BUN Annually Lab Results   Component Value Date    BUN 17 05/26/2023       Drug Serum Conc Annually No results found for: \"DIGOXIN\", \"DIG\", \"VALP\"         Chronic Obstructive Pulmonary Disease (COPD)    Spirometry Annually Spirometry date:

## 2024-05-25 NOTE — PROGRESS NOTES
Subjective:   Candelairo Louis Jr. is a 68 year old male who presents for a Medicare Subsequent Annual Wellness visit (Pt already had Initial Annual Wellness) and stable chronic illnesses (not addressed in visit).       History/Other:   Fall Risk Assessment:   He has been screened for Falls and is High Risk. Fall Prevention information provided to patient in After Visit Summary.    Do you feel unsteady when standing or walking?: No  Do you worry about falling?: No  Have you fallen in the past year?: Yes  How many times have you fallen?: 2  Were you injured?: No     Cognitive Assessment:   He had a completely normal cognitive assessment - see flowsheet entries     Functional Ability/Status:   Candelario Louis Jr. has some abnormal functions as listed below:  He has Vision problems based on screening of functional status.       Depression Screening (PHQ-2/PHQ-9): PHQ-2 SCORE: 0  , done 5/25/2024        5 minutes spent screening and counseling for depression    Advanced Directives:   He does NOT have a Living Will. [Do you have a living will?: No]  He does NOT have a Power of  for Health Care. [Do you have a healthcare power of ?: No]  Not discussed      Patient Active Problem List   Diagnosis    Essential hypertension    Tobacco abuse    Tubular adenoma of colon    Family history of brain aneurysm    Cluster headaches    Vitamin D deficiency    Prostate cancer (HCC)    Pulmonary nodule    Lumbar radiculopathy    Body mass index (BMI) of 31.0-31.9 in adult    Thrombocytopenia (HCC)    Pure hypertriglyceridemia    Fatty liver    Other emphysema (HCC)    Age-related nuclear cataract of both eyes    Family history of glaucoma in maternal grandmother    Lumbar stenosis with neurogenic claudication     Allergies:  He has No Known Allergies.    Current Medications:  Outpatient Medications Marked as Taking for the 5/25/24 encounter (Office Visit) with Sissy Concepcion MD   Medication Sig    losartan 100 MG Oral  Tab Take 1 tablet (100 mg total) by mouth daily.    Glucose Blood (ACCU-CHEK GUIDE) In Vitro Strip 1 strip by In Vitro route daily.    hydroCHLOROthiazide 25 MG Oral Tab Take 1 tablet (25 mg total) by mouth daily.    GABAPENTIN 300 MG Oral Cap TAKE 2 CAPSULES THREE TIMES DAILY    Blood Glucose Monitoring Suppl (ACCU-CHEK GUIDE) w/Device Does not apply Kit 1 kit daily.    Blood Glucose Calibration (ACCU-CHEK GUIDE CONTROL) In Vitro Liquid 1 drop by In Vitro route As Directed.    Alcohol Swabs Does not apply Pads Apply 1 Swab topically daily.    Accu-Chek Softclix Lancets Does not apply Misc 1 Lancet by Finger stick route once daily.    amLODIPine 5 MG Oral Tab Take 1 tablet (5 mg total) by mouth daily.    diclofenac 75 MG Oral Tab EC Take 1 tablet (75 mg total) by mouth 2 (two) times daily.    metFORMIN 500 MG Oral Tab TAKE 1 TABLET EVERY DAY    pantoprazole 40 MG Oral Tab EC Take 1 tablet (40 mg total) by mouth before breakfast.    atorvastatin 20 MG Oral Tab Take 1 tablet (20 mg total) by mouth nightly.    Multiple Vitamins-Minerals (MENS MULTI VITAMIN & MINERAL) Oral Tab Take by mouth.    DULoxetine 60 MG Oral Cap DR Particles Take 1 capsule (60 mg total) by mouth daily.    hydrocortisone (ANUSOL-HC) 25 MG Rectal Suppos Place 1 suppository (25 mg total) rectally 2 (two) times daily. For 14 days    acetaminophen 500 MG Oral Tab Take 1 tablet (500 mg total) by mouth every 8 (eight) hours as needed for Pain.       Medical History:  He  has a past medical history of Age-related nuclear cataract of both eyes (11/19/2020), Age-related nuclear cataract of both eyes (11/19/2020), Body mass index (BMI) of 31.0-31.9 in adult (12/14/2019), Cluster headaches (11/21/2017), Cluster headaches (11/21/2017), Cluster headaches (11/21/2017), COPD (chronic obstructive pulmonary disease) ([facility]), Diabetes ([facility]) (5/2019), Esophageal reflux, Essential hypertension, Family history of glaucoma in maternal grandmother (11/19/2020), Lumbar  stenosis with neurogenic claudication (12/18/2023), Prostate cancer (HCC) (03/14/2018), Prostate cancer (HCC) (03/23/2018), PSA elevation, Sleep apnea, Thrombocytopenia (HCC) (03/17/2020), Tobacco abuse (10/24/2017), Vitamin D deficiency (11/21/2017), and Vitamin D deficiency (11/21/2017).  Surgical History:  He  has a past surgical history that includes colonoscopy and other surgical history (Left).   Family History:  His family history includes Cancer in his mother; Diabetes in his maternal grandmother, mother, and paternal grandfather; Glaucoma in his maternal grandmother and mother; Hypertension in his father.  Social History:  He  reports that he has been smoking cigarettes. He has a 20 pack-year smoking history. He has never used smokeless tobacco. He reports current alcohol use of about 6.0 standard drinks of alcohol per week. He reports current drug use. Drug: Cannabis.    Tobacco:  Social History     Tobacco Use   Smoking Status Every Day    Current packs/day: 0.50    Average packs/day: 0.5 packs/day for 40.0 years (20.0 ttl pk-yrs)    Types: Cigarettes   Smokeless Tobacco Never     E-Cigarettes/Vaping       Questions Responses    E-Cigarette Use Never User           Tobacco cessation counseling for <3 minutes.      CAGE Alcohol Screen:   CAGE screening score of 0 on 5/25/2024, showing low risk of alcohol abuse.      Patient Care Team:  Sissy Concepcion MD as PCP - General (Family Medicine)  Good Romeo MD (Radiation Oncology)  Garrison Shaffer MD (Radiation Oncology)  Shae Phillips, Marleen Huffman, PT as Physical Therapist (Physical Therapy)  Ernie Kaba, MARIO ALBERTO as Physical Therapist (Physical Therapy)    Review of Systems  GENERAL: feels well otherwise  SKIN: denies any unusual skin lesions  EYES: denies blurred vision or double vision  HEENT: denies nasal congestion, sinus pain or ST  LUNGS: denies shortness of breath with exertion  CARDIOVASCULAR: denies chest pain on  exertion  GI: denies abdominal pain, denies heartburn  : 1 per night nocturia, no complaint of urinary incontinence  MUSCULOSKELETAL: denies back pain  NEURO: denies headaches  PSYCHE: denies depression or anxiety  HEMATOLOGIC: denies hx of anemia  ENDOCRINE: denies thyroid history  ALL/ASTHMA: denies hx of allergy or asthma    Objective:   Physical Exam  General Appearance:  Alert, cooperative, no distress, appears stated age   Head:  Normocephalic, without obvious abnormality, atraumatic   Eyes:  PERRL, conjunctiva/corneas clear, EOM's intact, both eyes   Ears:  Normal TM's and external ear canals, both ears   Nose: Nares normal, septum midline, mucosa normal, no drainage or sinus tenderness   Throat: Lips, mucosa, and tongue normal; teeth and gums normal   Neck: Supple, symmetrical, trachea midline, no adenopathy, thyroid: not enlarged, symmetric, no tenderness/mass/nodules, no carotid bruit or JVD   Back:   Symmetric, no curvature, ROM normal, no CVA tenderness   Lungs:   Clear to auscultation bilaterally, respirations unlabored   Chest Wall:  No tenderness or deformity   Heart:  Regular rate and rhythm, S1, S2 normal, no murmur, rub or gallop   Abdomen:   Soft, non-tender, bowel sounds active all four quadrants,  no masses, no organomegaly   Genitalia:    Rectal:    Extremities: Extremities normal, atraumatic, no cyanosis or edema   Pulses: 2+ and symmetric   Skin: Skin color, texture, turgor normal, no rashes or lesions   Lymph nodes: Cervical, supraclavicular, and axillary nodes normal   Neurologic: Normal     /76 (BP Location: Right arm, Patient Position: Sitting, Cuff Size: adult)   Pulse 68   Resp 18   Ht 6' 4\" (1.93 m)   Wt 229 lb 6.4 oz (104.1 kg)   SpO2 96%   BMI 27.92 kg/m²  Estimated body mass index is 27.92 kg/m² as calculated from the following:    Height as of this encounter: 6' 4\" (1.93 m).    Weight as of this encounter: 229 lb 6.4 oz (104.1 kg).    Medicare Hearing Assessment:    Hearing Screening    Time taken: 5/25/2024  9:00 AM  Screening Method: Questionnaire  I have a problem hearing over the telephone: No I have trouble following the conversations when two or more people are talking at the same time: No   I have trouble understanding things on the TV: No I have to strain to understand conversations: No   I have to worry about missing the telephone ring or doorbell: No I have trouble hearing conversations in a noisy background such as a crowded room or restaurant: No   I get confused about where sounds come from: No I misunderstand some words in a sentence and need to ask people to repeat themselves: No   I especially have trouble understanding the speech of women and children: No I have trouble understanding the speaker in a large room such as at a meeting or place of Jain: No   Many people I talk to seem to mumble (or don't speak clearly): No People get annoyed because I misunderstand what they say: No   I misunderstand what others are saying and make inappropriate responses: No I avoid social activities because I cannot hear well and fear I will reply improperly: No   Family members and friends have told me they think I may have hearing loss: No                   Assessment & Plan:   Candelario Louis Jr. is a 68 year old male who presents for a Medicare Assessment.     1. Prediabetes (Primary)  -     POC Glycohemoglobin [44650]  -     [facility] PCP or Registry Removal Request  May stop taking metformin   2. Tubular adenoma of colon  Needs colonoscopy   -     [facility] GI Telephone Colon Screen; Future; Expected date: 06/01/2024  3. Pure hypertriglyceridemia  Stable cpm  4. Pulmonary nodule  Stable cpm  Overview:  5/2020  CONCLUSION:   1. Small bilateral solid and ground-glass density lung nodules measuring up to 4-5 mm.  These appear similar in size and morphology dating to index chest CT in April, 2018.  As there is mild emphysema, continued 12 month surveillance imaging is   suggested.  2.  Severe fatty liver.          Remote - PS     Dictated by (CST): Jaden Gardiner MD on 5/26/2020 at 9:43 AM       5. Other emphysema (HCC)  Overview:  Stable cpm      6. Lumbar radiculopathy  Stable cpm  7. Fatty liver  Stable cpm  8. Family history of glaucoma in maternal grandmother  -     OPHTHALMOLOGY - INTERNAL  Needs to see ophthalmologist   9. Essential hypertension  Needs to restart medication ( stopped IT ) and f/u in 3 months   The patient indicates understanding of these issues and agrees to the plan.  Reinforced healthy diet, lifestyle, and exercise.      Return in about 3 months (around 8/25/2024).     Sissy Concepcion MD, 5/25/2024     Supplementary Documentation:   General Health:  In the past six months, have you lost more than 10 pounds without trying?: 2 - No  Has your appetite been poor?: No  Type of Diet: Balanced  How does the patient maintain a good energy level?: Appropriate Exercise  How would you describe your daily physical activity?: Moderate  How would you describe your current health state?: Good  On a scale of 0 to 10, with 0 being no pain and 10 being severe pain, what is your pain level?: 6 - (Moderate)  In the past six months, have you experienced urine leakage?: 0-No  At any time do you feel concerned for the safety/well-being of yourself and/or your children, in your home or elsewhere?: No  Have you had any immunizations at another office such as Influenza, Hepatitis B, Tetanus, or Pneumococcal?: Yes        Candelario Louis Jr.'s SCREENING SCHEDULE   Tests on this list are recommended by your physician but may not be covered, or covered at this frequency, by your insurer.   Please check with your insurance carrier before scheduling to verify coverage.   PREVENTATIVE SERVICES FREQUENCY &  COVERAGE DETAILS LAST COMPLETION DATE   Diabetes Screening    Fasting Blood Sugar / Glucose    One screening every 12 months if never tested or if previously tested but not diagnosed with pre-diabetes    One screening every 6 months if diagnosed with pre-diabetes Lab Results   Component Value Date     (H) 05/26/2023        Cardiovascular Disease Screening    Lipid Panel  Cholesterol  Lipoprotein (HDL)  Triglycerides Covered every 5 years for all Medicare beneficiaries without apparent signs or symptoms of cardiovascular disease Lab Results   Component Value Date    CHOLEST 133 05/26/2023    HDL 34 (L) 05/26/2023    LDL 52 05/26/2023    LDLD 109 (H) 08/24/2020    TRIG 307 (H) 05/26/2023         Electrocardiogram (EKG)   Covered if needed at Welcome to Medicare, and non-screening if indicated for medical reasons 10/06/2022      Ultrasound Screening for Abdominal Aortic Aneurysm (AAA) Covered once in a lifetime for one of the following risk factors    Men who are 65-75 years old and have ever smoked    Anyone with a family history -     Colorectal Cancer Screening  Covered for ages 50-85; only need ONE of the following:    Colonoscopy   Covered every 10 years    Covered every 2 years if patient is at high risk or previous colonoscopy was abnormal -    Health Maintenance   Topic Date Due    Colorectal Cancer Screening  08/13/2023       Flexible Sigmoidoscopy   Covered every 4 years -    Fecal Occult Blood Test Covered annually -   Prostate Cancer Screening    Prostate-Specific Antigen (PSA) Annually Lab Results   Component Value Date    PSA <0.01 07/29/2022     Health Maintenance   Topic Date Due    PSA  07/29/2024      Immunizations    Influenza Covered once per flu season  Please get every year 09/21/2023  No recommendations at this time    Pneumococcal Each vaccine ([id number] & [id number]) covered once after 65 Prevnar 13: 12/14/2019    Txxrixfcy66: 09/08/2020     No recommendations at this time    Hepatitis B One screening covered for patients with certain risk factors   -  No recommendations at this time    Tetanus Toxoid Not covered by Medicare Part B unless medically necessary (cut with metal); may be  covered with your pharmacy prescription benefits 12/01/2002    Tetanus, Diptheria and Pertusis TD and TDaP Not covered by Medicare Part B -  No recommendations at this time    Zoster Not covered by Medicare Part B; may be covered with your pharmacy  prescription benefits 12/15/2015  Zoster Vaccines(1 of 2) due on 02/09/2016     Diabetes      Hemoglobin A1C Annually; if last result is elevated, may be asked to retest more frequently.    Medicare covers every 3 months Lab Results   Component Value Date     (H) 12/03/2020    A1C 5.6 05/25/2024       No recommendations at this time    Creat/alb ratio Annually Lab Results   Component Value Date    MICROALBCREA 11.5 12/03/2020       LDL Annually Lab Results   Component Value Date    LDL 52 05/26/2023       Dilated Eye Exam Annually Last Diabetic Eye Exam:  No data recorded  No data recorded       Annual Monitoring of Persistent Medications (ACE/ARB, digoxin diuretics, anticonvulsants)    Potassium Annually Lab Results   Component Value Date    K 4.6 05/26/2023         Creatinine   Annually Lab Results   Component Value Date    CREATSERUM 0.86 05/26/2023         BUN Annually Lab Results   Component Value Date    BUN 17 05/26/2023       Drug Serum Conc Annually No results found for: \"DIGOXIN\", \"DIG\", \"VALP\"           Chronic Obstructive Pulmonary Disease (COPD)    Spirometry Annually Spirometry date:

## 2024-05-30 ENCOUNTER — PATIENT OUTREACH (OUTPATIENT)
Dept: CASE MANAGEMENT | Age: 69
End: 2024-05-30

## 2024-05-30 NOTE — PROCEDURES
The office order for Diabetes registry removal request is Approved and finalized on May 30, 2024.    Thanks,  [facility] Team

## 2024-06-03 NOTE — H&P
[facility] - Gastroenterology                                                                                                               Reason for consult: eval    Requesting physician or provider: Sissy Concepcion MD    No chief complaint on file.      HPI:   Candelario Louis Jr. is a 68 year old year-old male with history as detailed below:    he is here today for evaluation prior to cln    he moves his bowels daily and without recent change. he denies straining and/or incomplete evacuation.  he denies brbpr and/or melena.    Chronic gerd controlled on pantoprazole. he denies dysphagia, odynophagia and/or globus. he denies abdominal pain. he denies nausea and/or vomiting.  he denies recent change in appetite and/or unintentional weight loss.    Ct chest - 5/2/24  Incidental finding of   Stable diffuse gastric wall thickening     NSAIDS: diclofenac bid  Tobacco: current  Alcohol: social  Marijuana: somewhat -smoke  Illicit drugs: no    No FH GI malignancy    No history of adverse reaction to sedation  PHYLLIS  No anticoagulants  No pacemaker/defibrillator  No pain medications and/or sleep aides      Last colonoscopy:11 years ago at [facility] and he does not recall having polyps  He says was given 10 y crc screening interval    C-scope 2005 w/ polyps    FINAL DIAGNOSIS   TRANSVERSE POLYP; BIOPSY: (SPECIMEN A)   -FRAGMENTS OF TUBULAR ADENOMA   DESCENDING POLYP; BIOPSY: (SPECIMEN B)   -TUBULAR ADENOMA   SIGMOID POLYP; BIOPSY: (SPECIMEN C)   -FRAGMENTS OF COLONIC MUCOSA WITHOUT SIGNIFICANT PATHOLOGY     Last EGD: no    Wt Readings from Last 6 Encounters:   06/05/24 227 lb (103 kg)   05/25/24 229 lb 6.4 oz (104.1 kg)   03/14/24 233 lb (105.7 kg)   12/12/23 225 lb (102.1 kg)   11/30/23 225 lb (102.1 kg)   11/09/23 225 lb 9.6 oz (102.3 kg)        History, Medications, Allergies, ROS:      Past Medical History:    Age-related nuclear cataract of both eyes    Age-related  nuclear cataract of both eyes    Body mass index (BMI) of 31.0-31.9 in adult    Cluster headaches    Cluster headaches    Cluster headaches    COPD (chronic obstructive pulmonary disease) (HCC)    Diabetes (HCC)    Esophageal reflux    Essential hypertension    Family history of glaucoma in maternal grandmother    Lumbar stenosis with neurogenic claudication    Prostate cancer (HCC)    Prostate cancer (HCC)    PSA elevation    Sleep apnea    Thrombocytopenia (HCC)    Tobacco abuse    Vitamin D deficiency    Vitamin D deficiency      Past Surgical History:   Procedure Laterality Date    Colonoscopy      Other surgical history Left     left 1st knuckle fx repair(15 years old)      Family Hx:   Family History   Problem Relation Age of Onset    Hypertension Father     Diabetes Mother     Cancer Mother         pancreatic,gastric    Glaucoma Mother     Glaucoma Maternal Grandmother     Diabetes Maternal Grandmother     Diabetes Paternal Grandfather       Social History:   Social History     Socioeconomic History    Marital status:    Tobacco Use    Smoking status: Every Day     Current packs/day: 0.50     Average packs/day: 0.5 packs/day for 40.0 years (20.0 ttl pk-yrs)     Types: Cigarettes    Smokeless tobacco: Never   Vaping Use    Vaping status: Never Used   Substance and Sexual Activity    Alcohol use: Yes     Alcohol/week: 6.0 standard drinks of alcohol     Types: 6 Shots of liquor per week     Comment: 1 beer daily x20 years (as of 12/14/19)    Drug use: Yes     Types: Cannabis     Comment: 1-2x/week (as of 12/14/19)   Other Topics Concern    Caffeine Concern Yes     Comment: 3 cups coffee daily     Exercise Yes     Comment: back stretching 7x/week   Social History Narrative    The patient does not use an assistive device..      The patient does live in a home with stairs.     Social Determinants of Health     Financial Resource Strain: Medium Risk (10/4/2021)    Received from [facility]  [facility]    Overall Financial Resource Strain (CARDIA)     Difficulty of Paying Living Expenses: Somewhat hard   Food Insecurity: No Food Insecurity (10/4/2021)    [facility], [facility]    Hunger Vital Sign     Worried About Running Out of Food in the Last Year: Never true     Ran Out of Food in the Last Year: Never true   Transportation Needs: No Transportation Needs (10/4/2021)    Received from [facility]    PRAPARE - Transportation     Lack of Transportation (Medical): No     Lack of Transportation (Non-Medical): No   Physical Activity: Inactive (10/4/2021)    [facility], [facility]    Exercise Vital Sign     Days of Exercise per Week: 3 days     Minutes of Exercise per Session: 0 min   Stress: No Stress Concern Present (10/4/2021)    Received from [facility]    M[place] Model of Occupational Health - Occupational Stress Questionnaire     Feeling of Stress : Not at all   Social Connections: Unknown (10/4/2021)    [facility] from [facility], [facility]    Social Connection and Isolation Panel [NHANES]     Frequency of Communication with Friends and Family: Patient declined     Frequency of Social Gatherings with Friends and Family: Patient declined     Attends Yarsanism Services: 1 to 4 times per year     Active Member of Clubs or Organizations: No     Attends Club or Organization Meetings: Never     Marital Status:    Housing Stability: Low Risk  (10/4/2021)    Received from [facility]    Housing Stability Vital Sign     Unable to Pay for Housing in the Last Year: No     Number of Places Lived in the Last Year: 1     In the last 12 months, was there a  time when you did not have a steady place to sleep or slept in a shelter (including now)?: No        Medications (Active prior to today's visit):  Current Outpatient Medications   Medication Sig Dispense Refill    PEG 3350-KCl-Na Bicarb-NaCl (TRILYTE) 420 g Oral Recon Soln Take prep as directed by gastro office. May substitute with Trilyte/generic equivalent if needed. 4000 mL 0    losartan 100 MG Oral Tab Take 1 tablet (100 mg total) by mouth daily. 90 tablet 3    Glucose Blood (ACCU-CHEK GUIDE) In Vitro Strip 1 strip by In Vitro route daily. 100 strip 3    hydroCHLOROthiazide 25 MG Oral Tab Take 1 tablet (25 mg total) by mouth daily. 90 tablet 3    GABAPENTIN 300 MG Oral Cap TAKE 2 CAPSULES THREE TIMES DAILY 360 capsule 5    Blood Glucose Monitoring Suppl (ACCU-CHEK GUIDE) w/Device Does not apply Kit 1 kit daily. 1 kit 0    Blood Glucose Calibration (ACCU-CHEK GUIDE CONTROL) In Vitro Liquid 1 drop by In Vitro route As Directed. 3 each 3    Alcohol Swabs Does not apply Pads Apply 1 Swab topically daily. 100 each 3    Accu-Chek Softclix Lancets Does not apply Misc 1 Lancet by Finger stick route once daily. 100 each 3    amLODIPine 5 MG Oral Tab Take 1 tablet (5 mg total) by mouth daily. 30 tablet 0    diclofenac 75 MG Oral Tab EC Take 1 tablet (75 mg total) by mouth 2 (two) times daily. 180 tablet 0    pantoprazole 40 MG Oral Tab EC Take 1 tablet (40 mg total) by mouth before breakfast. 90 tablet 3    atorvastatin 20 MG Oral Tab Take 1 tablet (20 mg total) by mouth nightly. 90 tablet 3    Accu-Chek FastClix Lancets Does not apply Misc 1 Lancet by In Vitro route daily. 100 each 3    Multiple Vitamins-Minerals (MENS MULTI VITAMIN & MINERAL) Oral Tab Take by mouth.      DULoxetine 60 MG Oral Cap DR Particles Take 1 capsule (60 mg total) by mouth daily. 180 capsule 2    acetaminophen 500 MG Oral Tab Take 1 tablet (500 mg total) by mouth every 8 (eight) hours as needed for Pain.      metFORMIN 500 MG Oral Tab TAKE 1  TABLET EVERY DAY (Patient not taking: Reported on 6/5/2024) 90 tablet 3    hydrocortisone (ANUSOL-HC) 25 MG Rectal Suppos Place 1 suppository (25 mg total) rectally 2 (two) times daily. For 14 days 28 suppository 1       Allergies:  No Known Allergies    ROS:   CONSTITUTIONAL: negative for fevers, chills, sweats and weight loss  EYES Negative for red eyes, yellow eyes, changes in vision  HEENT: Negative for dysphagia and hoarseness  RESPIRATORY: Negative for cough and shortness of breath  CARDIOVASCULAR: Negative for chest pain, palpitations  GASTROINTESTINAL: See HPI  GENITOURINARY: Negative for dysuria and frequency  MUSCULOSKELETAL: Negative for arthralgias and myalgias  NEUROLOGICAL: Negative for dizziness and headaches  BEHAVIOR/PSYCH: Negative for anxiety and poor appetite    PHYSICAL EXAM:   Blood pressure 143/70, pulse 87, height 6' 4\" (1.93 m), weight 227 lb (103 kg).    GEN: WD/WN, NAD  HEENT: Supple symmetrical, trachea midline  CV: RRR, the extremities are warm and well perfused   LUNGS: No increased work of breathing  ABDOMEN: No scars, normal bowel sounds, soft, non-tender, non-distended no rebound or guarding, no masses, no hepatomegaly  MSK: No redness, no warmth, no swelling of joints  SKIN: No jaundice, no erythema, no rashes  HEMATOLOGIC: No bleeding, no bruising  NEURO: Alert and interactive, normal gait    Labs/Imaging/Procedures:     Patient's pertinent labs and imaging were reviewed and discussed with patient today.        .  ASSESSMENT/PLAN:   Candelario Louis Jr. is a 68 year old year-old male with history as detailed below:    Crc screening  No recent change in bm, brbpr, and/or melena. No fhx gi malignancy. Colon polyps 2005. Had repeat 2013 - records not on file. He says was told to repeat in 10 y.  Due for crc screening.    #gerd  #abnormal ct  Chronic gerd controlled on pantoprazole. Noted recent ct chest w/ incidental finding of gastric wall thickening.  Finding seemingly stable. No fhx  gi malignancy. No abd pain, weight loss.  Not anemic 5/2024. Current tobacco, diclofenac use.  Has not had egd and think reasonable to further eval significance of finding.    #hepatic steatosis  Ultrasound elastography 7/2020. F1. LFTS normal 5/2024.  No concerning lesions.  We discussed finding and recommendation to limit alcohol, low-fat diet, monitor cholesterol, blood sugar with pcp. Consider further w/U at next visit.     -reflux diet modification  -pantoprazole  -limit nsaid, alcohol use  -tobacco cessation, stop inhaled marijuana use    1. Schedule colonoscopy/egd with [facility] w/ Dr. Vera or Dr. Worthy [Diagnosis: crc screening, gerd, abnormal ct]    2.  bowel prep from pharmacy (split trilyte)    3. Hold metformin day before and am of procedure    4. Read all bowel prep instructions carefully    5. AVOID seeds, nuts, popcorn, raw fruits and vegetables (cooked is okay) for 2-3 days before procedure    6.  You MAY need to go for COVID testing 72 hours before procedure. The testing team will call you a few days before your procedure to discuss with you if testing is required. If you are asked to go for COVID testing and do not completed the test, the procedure cannot be performed.     7. If you start any NEW medication after your visit today, please notify us. Certain medications will need to be held before the procedure, or the procedure cannot be performed.     >>>Please note: if you were prescribed Suprep for the bowel prep and it is too expensive or not covered by insurance, it is okay to substitute Trilyte (or any similar generic prep). This can be done by notifying the pharmacy or calling our office.      Orders This Visit:  No orders of the defined types were placed in this encounter.      Meds This Visit:  Requested Prescriptions     Signed Prescriptions Disp Refills    PEG 3350-KCl-Na Bicarb-NaCl (TRILYTE) 420 g Oral Recon Soln 4000 mL 0     Sig: Take prep as directed by gastro office. May  substitute with Trilyte/generic equivalent if needed.       Imaging & Referrals:  None    ENDOSCOPIC RISK BENEFIT DISCUSSION: I described the procedure in great detail with the patient. I discussed the risks and benefits, including but not limited to: bleeding, perforation, infection, anesthesia complications, and even death. Patient will be NPO after midnight and will have a person physically present at time of pick-up to drive patient home. Patient verbalized understanding and agrees to proceed with procedure as planned.    Martha Schaffer, APRN   6/5/2024        This note was partially prepared using Dragon Medical voice recognition dictation software. As a result, errors may occur. When identified, these errors have been corrected. While every attempt is made to correct errors during dictation, discrepancies may still exist.

## 2024-06-05 ENCOUNTER — TELEPHONE (OUTPATIENT)
Facility: CLINIC | Age: 69
End: 2024-06-05

## 2024-06-05 ENCOUNTER — OFFICE VISIT (OUTPATIENT)
Dept: GASTROENTEROLOGY | Facility: CLINIC | Age: 69
End: 2024-06-05
Payer: MEDICARE

## 2024-06-05 VITALS
HEART RATE: 87 BPM | DIASTOLIC BLOOD PRESSURE: 70 MMHG | BODY MASS INDEX: 27.64 KG/M2 | SYSTOLIC BLOOD PRESSURE: 143 MMHG | WEIGHT: 227 LBS | HEIGHT: 76 IN

## 2024-06-05 DIAGNOSIS — Z12.11 COLON CANCER SCREENING: Primary | ICD-10-CM

## 2024-06-05 DIAGNOSIS — K21.9 GASTROESOPHAGEAL REFLUX DISEASE WITHOUT ESOPHAGITIS: ICD-10-CM

## 2024-06-05 DIAGNOSIS — K76.0 HEPATIC STEATOSIS: ICD-10-CM

## 2024-06-05 DIAGNOSIS — R93.89 ABNORMAL CT SCAN: ICD-10-CM

## 2024-06-05 DIAGNOSIS — K21.9 GASTROESOPHAGEAL REFLUX DISEASE, UNSPECIFIED WHETHER ESOPHAGITIS PRESENT: ICD-10-CM

## 2024-06-05 PROCEDURE — 99204 OFFICE O/P NEW MOD 45 MIN: CPT | Performed by: NURSE PRACTITIONER

## 2024-06-05 PROCEDURE — 3078F DIAST BP <80 MM HG: CPT | Performed by: NURSE PRACTITIONER

## 2024-06-05 PROCEDURE — 3008F BODY MASS INDEX DOCD: CPT | Performed by: NURSE PRACTITIONER

## 2024-06-05 PROCEDURE — 3077F SYST BP >= 140 MM HG: CPT | Performed by: NURSE PRACTITIONER

## 2024-06-05 PROCEDURE — 1125F AMNT PAIN NOTED PAIN PRSNT: CPT | Performed by: NURSE PRACTITIONER

## 2024-06-05 PROCEDURE — 1160F RVW MEDS BY RX/DR IN RCRD: CPT | Performed by: NURSE PRACTITIONER

## 2024-06-05 PROCEDURE — 1159F MED LIST DOCD IN RCRD: CPT | Performed by: NURSE PRACTITIONER

## 2024-06-05 RX ORDER — POLYETHYLENE GLYCOL 3350, SODIUM CHLORIDE, SODIUM BICARBONATE, POTASSIUM CHLORIDE 420; 11.2; 5.72; 1.48 G/4L; G/4L; G/4L; G/4L
POWDER, FOR SOLUTION ORAL
Qty: 4000 ML | Refills: 0 | Status: SHIPPED | OUTPATIENT
Start: 2024-06-05

## 2024-06-05 NOTE — PATIENT INSTRUCTIONS
-reflux diet modification  -pantoprazole  -limit nsaid, alcohol use  -tobacco cessation, inhaled marijuana use    1. Schedule colonoscopy/egd with [facility] w/ Dr. Vera or Dr. Worthy [Diagnosis: crc screening, gerd, abnormal ct]    2.  bowel prep from pharmacy (split trilyte)    3. Hold metformin day before and am of procedure    4. Read all bowel prep instructions carefully    5. AVOID seeds, nuts, popcorn, raw fruits and vegetables (cooked is okay) for 2-3 days before procedure    6.  You MAY need to go for COVID testing 72 hours before procedure. The testing team will call you a few days before your procedure to discuss with you if testing is required. If you are asked to go for COVID testing and do not completed the test, the procedure cannot be performed.     7. If you start any NEW medication after your visit today, please notify us. Certain medications will need to be held before the procedure, or the procedure cannot be performed.     >>>Please note: if you were prescribed Suprep for the bowel prep and it is too expensive or not covered by insurance, it is okay to substitute Trilyte (or any similar generic prep). This can be done by notifying the pharmacy or calling our office.      Tips to Control Acid Reflux    To control acid reflux, you’ll need to make some basic diet and lifestyle changes. The simple steps outlined below may be all you’ll need to ease discomfort.   Watch what you eat  Don't have fatty foods or spicy foods.  Eat fewer acidic foods, such as citrus and tomato-based foods. These can increase symptoms.  Limit drinking alcohol, caffeine, and fizzy beverages. All increase acid reflux.  Try limiting chocolate, peppermint, and spearmint. These can make acid reflux worse in some people.     Watch when you eat  Don't lie down for 3 hours after eating.  Don't snack before going to bed.     Raise your head  Raising your head and upper body by 4 to 6 inches helps limit reflux when you’re lying down.  Put blocks under the head of your bed frame or a wedge under your mattress to raise it.   Other changes  Lose weight, if you need to  Don’t exercise near bedtime  Don't wear tight-fitting clothes  Limit aspirin and ibuprofen  Stop smoking     Rajiv last reviewed this educational content on 6/1/2019  © 5866-8980 [employer]. [address] [place] [address], [place] [address]. All rights reserved. This information is not intended as a substitute for professional medical care. Always follow your healthcare professional's instructions.

## 2024-06-05 NOTE — TELEPHONE ENCOUNTER
Patient requested a Friday for his procedure.     Scheduled for: Colonoscopy 29689/EGD [id number]    Provider Name: Dr Vera    Date: Fri 10/11/2024   Location: [facility]   Sedation: MAC   Time: 1:15 pm, (pt is aware that [facility] will call the day before to confirm arrival time)  Prep: split trilyte   Meds/Allergies Reconciled?: NKDA    Diagnosis with codes: colon screening Z12.11, GERD K21.9, Abnormal CT scan R93.89    Was patient informed to call insurance with codes (Y/N): Yes   Referral sent?: Yes, Medicare/[employer] PPO   [facility] or [facility] notified?: Electronic case request was sent to [facility] via [employer].     Medication Orders: 3. Hold metformin day before and am of procedure (patient denies taking meds at time of visit)  Patient is aware to NOT take iron pills, herbal meds and diet supplements for 7 days before exam. Also to NOT take any form of alcohol, recreational drugs and any forms of ED meds 24-72 hours before exam.      Misc Orders:       Further instructions given by staff: Instructions given in office and pt verbalized understanding             1. Schedule colonoscopy/egd with [facility] w/ Dr. Vera or Dr. Worthy [Diagnosis: crc screening, gerd, abnormal ct]     2.  bowel prep from pharmacy (split trilyte)     3. Hold metformin day before and am of procedure

## 2024-06-25 ENCOUNTER — OFFICE VISIT (OUTPATIENT)
Dept: SURGERY | Facility: CLINIC | Age: 69
End: 2024-06-25

## 2024-06-25 DIAGNOSIS — N52.9 ERECTILE DYSFUNCTION, UNSPECIFIED ERECTILE DYSFUNCTION TYPE: ICD-10-CM

## 2024-06-25 DIAGNOSIS — C61 PROSTATE CANCER (HCC): Primary | ICD-10-CM

## 2024-06-25 PROCEDURE — 99213 OFFICE O/P EST LOW 20 MIN: CPT | Performed by: UROLOGY

## 2024-06-25 PROCEDURE — 1159F MED LIST DOCD IN RCRD: CPT | Performed by: UROLOGY

## 2024-06-25 NOTE — PROGRESS NOTES
Candelario Louis Jr. is a 68 year old male.    HPI:     Chief Complaint   Patient presents with    Prostate Cancer     Yearly f/u, discuss psa results,        68-year-old male with a history of high risk prostate cancer clinical T1c [place] 4+5 PSA 23 diagnosed in saturation biopsy March 2018 metastatic workup at that time negative treated with 2 years of androgen deprivation therapy and combination external beam radiation therapy and seed implant boost to the pelvis August 2018.  Overall doing well.  He was last seen in the office September 15, 2022.  Urination symptoms are stable.  IPSS score today is 16 quality-of-life index is 2.  No gross hematuria or dysuria.  No bone pain or weight loss.  PSA remains stable May 25, 2024 at 0.04.  He is not bothered by his urination symptoms at present.      HISTORY:  Past Medical History:    Age-related nuclear cataract of both eyes    Age-related nuclear cataract of both eyes    Body mass index (BMI) of 31.0-31.9 in adult    Cluster headaches    Cluster headaches    Cluster headaches    COPD (chronic obstructive pulmonary disease) (HCC)    Diabetes (HCC)    Esophageal reflux    Essential hypertension    Family history of glaucoma in maternal grandmother    Lumbar stenosis with neurogenic claudication    Prostate cancer (HCC)    Prostate cancer (HCC)    PSA elevation    Sleep apnea    Thrombocytopenia (HCC)    Tobacco abuse    Vitamin D deficiency    Vitamin D deficiency      Past Surgical History:   Procedure Laterality Date    Colonoscopy      Other surgical history Left     left 1st knuckle fx repair(15 years old)      Family History   Problem Relation Age of Onset    Hypertension Father     Diabetes Mother     Cancer Mother         pancreatic,gastric    Glaucoma Mother     Glaucoma Maternal Grandmother     Diabetes Maternal Grandmother     Diabetes Paternal Grandfather       Social History:   Social History     Socioeconomic History    Marital status:    Tobacco Use     Smoking status: Every Day     Current packs/day: 0.50     Average packs/day: 0.5 packs/day for 40.0 years (20.0 ttl pk-yrs)     Types: Cigarettes    Smokeless tobacco: Never   Vaping Use    Vaping status: Never Used   Substance and Sexual Activity    Alcohol use: Yes     Alcohol/week: 6.0 standard drinks of alcohol     Types: 6 Shots of liquor per week     Comment: 1 beer daily x20 years (as of 12/14/19)    Drug use: Yes     Types: Cannabis     Comment: 1-2x/week (as of 12/14/19)   Other Topics Concern    Caffeine Concern Yes     Comment: 3 cups coffee daily     Exercise Yes     Comment: back stretching 7x/week   Social History Narrative    The patient does not use an assistive device..      The patient does live in a home with stairs.     Social Determinants of Health     Financial Resource Strain: Medium Risk (10/4/2021)    [facility], [facility]    Overall Financial Resource Strain (CARDIA)     Difficulty of Paying Living Expenses: Somewhat hard   Food Insecurity: No Food Insecurity (10/4/2021)    Received from [facility], [facility]    Hunger Vital Sign     Worried About Running Out of Food in the Last Year: Never true     Ran Out of Food in the Last Year: Never true   Transportation Needs: No Transportation Needs (10/4/2021)    Received from [facility], [facility]    PRAPARE - Transportation     Lack of Transportation (Medical): No     Lack of Transportation (Non-Medical): No   Physical Activity: Inactive (10/4/2021)    Received from [facility], [facility]    Exercise Vital Sign     Days of Exercise per Week: 3 days     Minutes of Exercise per Session: 0 min   Stress: No Stress Concern Present (10/4/2021)    Received from [facility], [facility]  Occupational Health - Occupational Stress Questionnaire     Feeling of Stress : Not at all   Social Connections: Unknown (10/4/2021)    [facility]    Social Connection and Isolation Panel [NHANES]     Frequency of Communication with Friends and Family: Patient declined     Frequency of Social Gatherings with Friends and Family: Patient declined     Attends Yazidism Services: 1 to 4 times per year     Active Member of Clubs or Organizations: No     Attends Club or Organization Meetings: Never     Marital Status:    Housing Stability: Low Risk  (10/4/2021)    [facility]    Housing Stability Vital Sign     Unable to Pay for Housing in the Last Year: No     Number of Places Lived in the Last Year: 1     In the last 12 months, was there a time when you did not have a steady place to sleep or slept in a shelter (including now)?: No        Medications (Active prior to today's visit):  Current Outpatient Medications   Medication Sig Dispense Refill    PEG 3350-KCl-Na Bicarb-NaCl (TRILYTE) 420 g Oral Recon Soln Take prep as directed by gastro office. May substitute with Trilyte/generic equivalent if needed. 4000 mL 0    losartan 100 MG Oral Tab Take 1 tablet (100 mg total) by mouth daily. 90 tablet 3    Glucose Blood (ACCU-CHEK GUIDE) In Vitro Strip 1 strip by In Vitro route daily. 100 strip 3    hydroCHLOROthiazide 25 MG Oral Tab Take 1 tablet (25 mg total) by mouth daily. 90 tablet 3    GABAPENTIN 300 MG Oral Cap TAKE 2 CAPSULES THREE TIMES DAILY 360 capsule 5    Blood Glucose Monitoring Suppl (ACCU-CHEK GUIDE) w/Device Does not apply Kit 1 kit daily. 1 kit 0    Blood Glucose Calibration (ACCU-CHEK GUIDE CONTROL) In Vitro Liquid 1 drop by In Vitro route As Directed. 3 each 3    Alcohol Swabs Does not apply Pads Apply 1 Swab topically daily. 100 each 3    Accu-Chek Softclix  Lancets Does not apply Misc 1 Lancet by Finger stick route once daily. 100 each 3    amLODIPine 5 MG Oral Tab Take 1 tablet (5 mg total) by mouth daily. 30 tablet 0    diclofenac 75 MG Oral Tab EC Take 1 tablet (75 mg total) by mouth 2 (two) times daily. 180 tablet 0    metFORMIN 500 MG Oral Tab TAKE 1 TABLET EVERY DAY (Patient not taking: Reported on 6/5/2024) 90 tablet 3    pantoprazole 40 MG Oral Tab EC Take 1 tablet (40 mg total) by mouth before breakfast. 90 tablet 3    atorvastatin 20 MG Oral Tab Take 1 tablet (20 mg total) by mouth nightly. 90 tablet 3    Accu-Chek FastClix Lancets Does not apply Misc 1 Lancet by In Vitro route daily. 100 each 3    Multiple Vitamins-Minerals (MENS MULTI VITAMIN & MINERAL) Oral Tab Take by mouth.      DULoxetine 60 MG Oral Cap DR Particles Take 1 capsule (60 mg total) by mouth daily. 180 capsule 2    hydrocortisone (ANUSOL-HC) 25 MG Rectal Suppos Place 1 suppository (25 mg total) rectally 2 (two) times daily. For 14 days 28 suppository 1    acetaminophen 500 MG Oral Tab Take 1 tablet (500 mg total) by mouth every 8 (eight) hours as needed for Pain.         Allergies:  No Known Allergies      ROS:       PHYSICAL EXAM:        ASSESSMENT/PLAN:   Assessment   Encounter Diagnoses   Name Primary?    Prostate cancer (HCC) Yes    Erectile dysfunction, unspecified erectile dysfunction type        Reviewed findings at length with patient.  Recommended follow-up on a as needed basis.  He will get his PSAs annually through his primary care physician.  We agreed that he would return to clinic to see me if he has any questions or concerns.         Orders This Visit:  No orders of the defined types were placed in this encounter.      Meds This Visit:  Requested Prescriptions      No prescriptions requested or ordered in this encounter       Imaging & Referrals:  None     6/25/2024  Osito García MD

## 2024-07-05 DIAGNOSIS — M48.062 LUMBAR STENOSIS WITH NEUROGENIC CLAUDICATION: ICD-10-CM

## 2024-07-08 RX ORDER — DICLOFENAC SODIUM 75 MG/1
75 TABLET, DELAYED RELEASE ORAL 2 TIMES DAILY
Qty: 180 TABLET | Refills: 0 | Status: SHIPPED | OUTPATIENT
Start: 2024-07-08

## 2024-07-08 NOTE — TELEPHONE ENCOUNTER
Refill Request    Medication request: diclofenac 75 MG Oral Tab EC. Take 1 tablet (75 mg total) by mouth 2 (two) times daily.      LOV:11/30/2023 Rohan Yu DO   Due back to clinic per last office note:  F/u for injection.   NOV: Visit date not found      [place]: [place] obtain    Urine drug screen (if applicable): N/A  Pain contract: N/A    LOV plan (if weaning or changing medications): He continues with gabapentin, duloxetine and diclofenac.     MCM sent to schedule an appointment.

## 2024-07-29 RX ORDER — ATORVASTATIN CALCIUM 20 MG/1
20 TABLET, FILM COATED ORAL NIGHTLY
Qty: 90 TABLET | Refills: 3 | Status: SHIPPED | OUTPATIENT
Start: 2024-07-29

## 2024-07-29 NOTE — TELEPHONE ENCOUNTER
Refill passes per [facility] protocol.    No future appointments with family medicine/internal medicine.  LAST OFFICE VISIT: 5/25/24    Requested Prescriptions   Pending Prescriptions Disp Refills    ATORVASTATIN 20 MG Oral Tab [Pharmacy Med Name: ATORVASTATIN CALCIUM 20 MG Tablet] 90 tablet 3     Sig: TAKE 1 TABLET EVERY NIGHT       Cholesterol Medication Protocol Passed - 7/25/2024  1:45 AM        Passed - ALT < 80     Lab Results   Component Value Date    ALT 24 05/25/2024             Passed - ALT resulted within past year        Passed - Lipid panel within past 12 months     Lab Results   Component Value Date    CHOLEST 146 05/25/2024    TRIG 109 05/25/2024    HDL 44 05/25/2024    LDL 82 05/25/2024    VLDL 17 05/25/2024    NONHDLC 102 05/25/2024             Passed - In person appointment or virtual visit in the past 12 mos or appointment in next 3 mos     Recent Outpatient Visits              1 month ago Prostate cancer (HCC)    [facility] Osito García MD    Office Visit    1 month ago Colon cancer screening    [facility] Martha Schaffer APRN    Office Visit    2 months ago [facility]    Office Visit    4 months ago Screening for colon cancer    [facility] Sissy Chew MD    Office Visit    8 months ago Lumbar stenosis with neurogenic claudication    [facility]    Office Visit          Future Appointments         Provider Department Appt Notes    Tomorrow Rohan Yu DO [facility] Talk about,an injection    In 2 months [facility]GD @ [facility]                         Future Appointments         Provider Department Appt  Notes    Tomorrow [facility] Talk about,an injection    In 2 months [facility]          Recent Outpatient Visits              1 month ago [facility]HCC)    [facility]    Office Visit    1 month ago Colon cancer screening    [facility] Martha Schaffer APRN    Office Visit    2 months ago Prediabetes    [facility] [place]    Office Visit    4 months ago Screening for colon cancer    [facility] Sissy Chew MD    Office Visit    8 months ago Lumbar stenosis with neurogenic claudication    [facility] [facility]    Office Visit

## 2024-07-30 ENCOUNTER — OFFICE VISIT (OUTPATIENT)
Dept: PHYSICAL MEDICINE AND REHAB | Facility: CLINIC | Age: 69
End: 2024-07-30
Payer: MEDICARE

## 2024-07-30 ENCOUNTER — TELEPHONE (OUTPATIENT)
Dept: PHYSICAL MEDICINE AND REHAB | Facility: CLINIC | Age: 69
End: 2024-07-30

## 2024-07-30 VITALS — HEIGHT: 76 IN | WEIGHT: 227 LBS | BODY MASS INDEX: 27.64 KG/M2

## 2024-07-30 DIAGNOSIS — M16.12 PRIMARY OSTEOARTHRITIS OF LEFT HIP: ICD-10-CM

## 2024-07-30 DIAGNOSIS — M70.62 GREATER TROCHANTERIC BURSITIS OF LEFT HIP: Primary | ICD-10-CM

## 2024-07-30 DIAGNOSIS — M48.062 LUMBAR STENOSIS WITH NEUROGENIC CLAUDICATION: ICD-10-CM

## 2024-07-30 PROCEDURE — 99214 OFFICE O/P EST MOD 30 MIN: CPT | Performed by: PHYSICAL MEDICINE & REHABILITATION

## 2024-07-30 PROCEDURE — 3008F BODY MASS INDEX DOCD: CPT | Performed by: PHYSICAL MEDICINE & REHABILITATION

## 2024-07-30 PROCEDURE — 1159F MED LIST DOCD IN RCRD: CPT | Performed by: PHYSICAL MEDICINE & REHABILITATION

## 2024-07-30 PROCEDURE — 1160F RVW MEDS BY RX/DR IN RCRD: CPT | Performed by: PHYSICAL MEDICINE & REHABILITATION

## 2024-07-30 PROCEDURE — 20611 DRAIN/INJ JOINT/BURSA W/US: CPT | Performed by: PHYSICAL MEDICINE & REHABILITATION

## 2024-07-30 RX ORDER — LIDOCAINE HYDROCHLORIDE 10 MG/ML
4 INJECTION, SOLUTION INFILTRATION; PERINEURAL ONCE
Status: COMPLETED | OUTPATIENT
Start: 2024-07-30 | End: 2024-07-30

## 2024-07-30 RX ORDER — TRIAMCINOLONE ACETONIDE 40 MG/ML
40 INJECTION, SUSPENSION INTRA-ARTICULAR; INTRAMUSCULAR ONCE
Status: COMPLETED | OUTPATIENT
Start: 2024-07-30 | End: 2024-07-30

## 2024-07-30 NOTE — TELEPHONE ENCOUNTER
Initiated authorization for Left trochanteric bursa steroid injection under US guidance. CPT/HCPCS 85194, , dx:M70.62 with Avail[employer]    Done today in the office

## 2024-07-30 NOTE — PATIENT INSTRUCTIONS
Please let me know through [employer] how you are doing in 1 weeks' time. If no better I will order an Xray of your hip and ask you to follow up.

## 2024-07-30 NOTE — PROCEDURES
Procedure Note: left  trochanteric bursa injection under US guidance  Diagnosis: left trochanteric bursitis    After consent was obtained, patient was placed on table in the lateral recumbant position with the left side up.  Area of interest was identified under ultrasound guidance, in particular the sub-gluteal bursa between the gluteus bren and medius insertions on the greater trochanter, and the overlying soft tissue was prepped using iodine swab in a sterile manner.  Next Ethyl Chloride spray was used at needle insertion point to numb skin. Next using a 25 gauge 1 1/2 inch needle the trochanter bursa was approached and injected a total of 40mg Kenolog in 4cc of 1% Lidocaine with negative aspiration for heme and no paresthesias being elicited.  Patient tolerated procedure well. Images were saved of the injection.  Rohan Yu DO  Physical Medicine and Rehabilitation  [facility]

## 2024-07-30 NOTE — PROGRESS NOTES
Progress note    C/C:   Chief Complaint   Patient presents with    Follow - Up     LOV 11/30/23 pt is here for a follow up on his left hip pain. Reports intermittent n/t in left leg. Takes gabapentin, diclofenac and duloxetine to ease pain. States pain is both aching and throbbing and states it's constant. Currently in pt but states it doesn't give him much relief. Pt is inquiring about an injections. Pain 6/10       HPI: 68-year-old male presents for follow-up.  I last saw him for bilateral L4 transforaminal epidural steroid injection under fluoroscopy in December 2023.  He has been having left lateral hip pain, intermittent numbness and tingling into the left leg. Standing and walking are eventually going to be painful if he stands or walks long enough without taking a seated rest break. Can walk 1-2 miles. Does have mild lower back pain across the waistline.  Has had an approximate 40 pound weight loss that was more or less intentional. He has had a bit of a lack of appetite since his wife passed away, and has not had much of a taste for food. He has been eating enough of what he needs to, but overall has not desired to eat more. Due to the weight loss and resulting decrease in blood glucose levels he was able to discontinue medication for diabetes after his A1c dropped from about 8 to 5.7.    Pertinent allergies: No Known Allergies     Physical exam:  Ht 76\"   Wt 227 lb (103 kg)   BMI 27.63 kg/m²      Tenderness to palpation of left proximal, lateral hip  Mild pain passive left hip flexion, to approximately 120 degrees, endrange pain  Mild restriction to internal rotation with provoked left lateral hip pain  5/5 LE strength b/l in HF, KE, ADF, EHL, ankle eversion  SILT b/l LE  2/4 quad, 1/4 gastrocs reflexes b/l  Supine straight leg raise left leg negative    Imaging: No new imaging to review    Assessment and plan  Left trochanteric bursitis  Primary osteoarthritis of left hip, radiographically mild  Lumbar  stenosis with neurogenic claudication  Prostate cancer, under surveillance.  PSA is to now be monitored by his primary care physician as he has been doing well; PSA in May 2024 was normal.     Primary symptoms seem to be left lateral hip pain more so than back pain, and today seem more consistent with trochanteric bursitis rather than symptomatic spinal stenosis with neurogenic claudication.  For diagnostic and therapeutic purposes I recommend left trochanteric bursa steroid injection under ultrasound guidance.  If no better we will have him do x-rays of the left hip and have him follow-up in clinic for reevaluation.  He is contact me in 1 week's time and let me know how he is doing after the trochanteric bursa injection.  See separate note for details.     Rohan Yu DO  Physical Medicine and Rehabilitation  [facility]

## 2024-08-13 ENCOUNTER — TELEPHONE (OUTPATIENT)
Dept: INTERNAL MEDICINE UNIT | Facility: HOSPITAL | Age: 69
End: 2024-08-13

## 2024-08-20 NOTE — TELEPHONE ENCOUNTER
Refill Per Protocol     Requested Prescriptions   Pending Prescriptions Disp Refills    METFORMIN 500 MG Oral Tab [Pharmacy Med Name: METFORMIN HYDROCHLORIDE 500 MG Tablet] 90 tablet 3     Sig: TAKE 1 TABLET EVERY DAY       Diabetes Medication Protocol Passed - 8/18/2024  1:38 AM        Passed - Last A1C < 7.5 and within past 6 months     Lab Results   Component Value Date    A1C 5.7 (H) 05/25/2024             Passed - In person appointment or virtual visit in the past 6 mos or appointment in next 3 mos     Recent Outpatient Visits              3 weeks ago Greater trochanteric bursitis of left hip    [facility]    Office Visit    1 month ago Prostate cancer (HCC)    [facility]    Office Visit    2 months ago Colon cancer screening    [facility] Martha Schaffer APRN    Office Visit    2 months ago Prediabetes    [facility]    Office Visit    5 months ago [facility] cancer    [facility]    [facility]          Future Appointments         Provider Department Appt Notes    In 3 days [facility]pcion [facility]    In 1 month [facility]                    Passed - Microalbumin procedure in past 12 months or taking ACE/ARB        Passed - EGFRCR or GFRAA > 50     GFR Evaluation  EGFRCR: 96 , resulted on 5/25/2024          Passed - GFR in the past 12 months               Future Appointments         Provider Department Appt Notes    In 3 days [facility]ion [facility]    [facility] 1 [facility]          Recent Outpatient Visits              3 weeks ago Greater trochanteric bursitis of left hip    [facility]    Office Visit    1 month ago Prostate cancer (HCC)    [facility]    Office Visit    2 months ago Colon cancer screening    [facility] Martha Schaffer APRN    Office Visit    2 months ago Prediabetes    [facility]    Office Visit    5 months ago Screening for colon cancer    [facility] Sissy Chew MD    Office Visit

## 2024-09-19 DIAGNOSIS — M48.062 LUMBAR STENOSIS WITH NEUROGENIC CLAUDICATION: ICD-10-CM

## 2024-09-19 RX ORDER — DICLOFENAC SODIUM 75 MG/1
75 TABLET, DELAYED RELEASE ORAL 2 TIMES DAILY
Qty: 180 TABLET | Refills: 3 | OUTPATIENT
Start: 2024-09-19

## 2024-09-19 NOTE — TELEPHONE ENCOUNTER
Refill Request    Medication request: DICLOFENAC 75 MG Oral Tab EC.  TAKE 1 TABLET TWICE DAILY      LOV:7/30/2024 Rohan Yu, DO   Due back to clinic per last office note:  If no better we will have him do x-rays of the left hip and have him follow-up in clinic for reevaluation.   NOV: Visit date not found      [place]: 7/8/24 #180 (90 days)    Urine drug screen (if applicable): N/A  Pain contract: N/A    LOV plan (if weaning or changing medications): not mentioned    Refill is too soon. Next fill date is on 10/6/2024

## 2024-10-02 PROBLEM — E11.65 TYPE 2 DIABETES MELLITUS WITH HYPERGLYCEMIA, WITHOUT LONG-TERM CURRENT USE OF INSULIN (HCC): Status: ACTIVE | Noted: 2020-03-17

## 2024-10-10 ENCOUNTER — TELEPHONE (OUTPATIENT)
Facility: CLINIC | Age: 69
End: 2024-10-10

## 2024-10-11 PROBLEM — K63.9 NODULE OF COLON: Status: ACTIVE | Noted: 2024-10-11

## 2024-10-11 PROBLEM — K63.5 POLYP OF ASCENDING COLON: Status: ACTIVE | Noted: 2024-10-11

## 2024-10-11 PROBLEM — Z01.89 NORMAL ESOPHAGOGASTRODUODENOSCOPY (EGD): Status: ACTIVE | Noted: 2024-10-11

## 2024-10-11 PROCEDURE — 88312 SPECIAL STAINS GROUP 1: CPT | Performed by: INTERNAL MEDICINE

## 2024-10-11 PROCEDURE — 88305 TISSUE EXAM BY PATHOLOGIST: CPT | Performed by: INTERNAL MEDICINE

## 2024-10-11 RX ORDER — PANTOPRAZOLE SODIUM 40 MG/1
40 TABLET, DELAYED RELEASE ORAL
Qty: 90 TABLET | Refills: 3 | Status: SHIPPED | OUTPATIENT
Start: 2024-10-11

## 2024-10-11 NOTE — TELEPHONE ENCOUNTER
Refill Per Protocol     Requested Prescriptions   Pending Prescriptions Disp Refills    PANTOPRAZOLE 40 MG Oral Tab EC [Pharmacy Med Name: Pantoprazole Sodium Oral Tablet Delayed Release 40 MG] 90 tablet 3     Sig: TAKE 1 TABLET BEFORE BREAKFAST       Gastrointestional Medication Protocol Passed - 10/11/2024 10:44 AM        Passed - In person appointment or virtual visit in the past 12 mos or appointment in next 3 mos     Recent Outpatient Visits              2 months ago Greater trochanteric bursitis of left hip    [facility]    Office Visit    3 months ago [facility]state cancer [facility]HCC)    [facility]    Office Visit    4 months ago Colon cancer screening    [facility] Martha Schaffer APRN    Office Visit    4 months ago [facility]    Office Visit    7 months ago [facility] for colon cancer    [facility]    Office Visit          Future Appointments         Provider Department Appt Notes    Today RUI, PROCEDURE [facility] [place] Colonoscopy/EGD @ [facility]    In 3 weeks Sissy Concepcion MD [facility] Blood pressure                           Future Appointments         Provider Department Appt Notes    Today RUI, PROCEDURE [facility] [place] Colonoscopy/EGD @ [facility]    In 3 weeks Sissy Concepcion MD [facility] Blood pressure          Recent Outpatient Visits              2 months ago Greater trochanteric bursitis of left hip    [facility]    Office Visit    3 months ago Prostate cancer (HCC)    [facility], Osito Romo MD    Office Visit    4 months ago Colon cancer screening    [facility] Martha Schaffer APRN    Office Visit    4 months ago Prediabetes    [facility], Sissy Chew MD    Office Visit    7 months ago Screening for colon cancer    [facility], Sissy Chew MD    Office Visit

## 2024-10-25 ENCOUNTER — TELEPHONE (OUTPATIENT)
Facility: CLINIC | Age: 69
End: 2024-10-25

## 2024-10-25 NOTE — TELEPHONE ENCOUNTER
I mailed out colonoscopy/EGD results letter to patient.  Updated health maintenance  Entered into  5 yr CLN recall  Recall colon in 5 years per. Colon done 10/11/2025      VETO Vera MD  [facility] Clinical Staff  GI staff: please place recall for colonoscopy in 5 years

## 2024-11-01 ENCOUNTER — OFFICE VISIT (OUTPATIENT)
Dept: FAMILY MEDICINE CLINIC | Facility: CLINIC | Age: 69
End: 2024-11-01

## 2024-11-01 VITALS
HEART RATE: 70 BPM | TEMPERATURE: 98 F | DIASTOLIC BLOOD PRESSURE: 57 MMHG | WEIGHT: 226 LBS | RESPIRATION RATE: 20 BRPM | BODY MASS INDEX: 27.52 KG/M2 | OXYGEN SATURATION: 97 % | SYSTOLIC BLOOD PRESSURE: 110 MMHG | HEIGHT: 76 IN

## 2024-11-01 DIAGNOSIS — M48.062 LUMBAR STENOSIS WITH NEUROGENIC CLAUDICATION: ICD-10-CM

## 2024-11-01 RX ORDER — DICLOFENAC SODIUM 75 MG/1
75 TABLET, DELAYED RELEASE ORAL 2 TIMES DAILY
Qty: 180 TABLET | Refills: 0 | Status: SHIPPED | OUTPATIENT
Start: 2024-11-01

## 2024-11-01 NOTE — PROGRESS NOTES
Subjective:   Patient ID: Candelario Louis Jr. is a 69 year old male.    HPI  Patient for f/u hypertension   Denies any chest pain shortness of breath or headaches.   Monitoring blood pressure at home and is below 135/85. Needs refill of medications.     History/Other:   Review of Systems    Constitutional: Negative.  Negative for activity change, appetite change, diaphoresis and fatigue.     Respiratory: Negative.  Negative for apnea, cough, chest tightness and shortness of breath.    Cardiovascular: Negative.  Negative for chest pain, palpitations and leg swelling.     Current Outpatient Medications   Medication Sig Dispense Refill    diclofenac 75 MG Oral Tab EC Take 1 tablet (75 mg total) by mouth 2 (two) times daily. 180 tablet 0    pantoprazole 40 MG Oral Tab EC Take 1 tablet (40 mg total) by mouth before breakfast. 90 tablet 3    metFORMIN 500 MG Oral Tab TAKE 1 TABLET EVERY DAY 90 tablet 3    atorvastatin 20 MG Oral Tab Take 1 tablet (20 mg total) by mouth nightly. 90 tablet 3    PEG 3350-KCl-Na Bicarb-NaCl (TRILYTE) 420 g Oral Recon Soln Take prep as directed by gastro office. May substitute with Trilyte/generic equivalent if needed. 4000 mL 0    losartan 100 MG Oral Tab Take 1 tablet (100 mg total) by mouth daily. 90 tablet 3    hydroCHLOROthiazide 25 MG Oral Tab Take 1 tablet (25 mg total) by mouth daily. 90 tablet 3    GABAPENTIN 300 MG Oral Cap TAKE 2 CAPSULES THREE TIMES DAILY 360 capsule 5    amLODIPine 5 MG Oral Tab Take 1 tablet (5 mg total) by mouth daily. 30 tablet 0    Multiple Vitamins-Minerals (MENS MULTI VITAMIN & MINERAL) Oral Tab Take by mouth.      DULoxetine 60 MG Oral Cap DR Particles Take 1 capsule (60 mg total) by mouth daily. 180 capsule 2    acetaminophen 500 MG Oral Tab Take 1 tablet (500 mg total) by mouth every 8 (eight) hours as needed for Pain.      Glucose Blood (ACCU-CHEK GUIDE) In Vitro Strip 1 strip by In Vitro route daily. 100 strip 3    Blood Glucose Monitoring Suppl  (ACCU-CHEK GUIDE) w/Device Does not apply Kit 1 kit daily. 1 kit 0    Blood Glucose Calibration (ACCU-CHEK GUIDE CONTROL) In Vitro Liquid 1 drop by In Vitro route As Directed. 3 each 3    Alcohol Swabs Does not apply Pads Apply 1 Swab topically daily. 100 each 3    Accu-Chek Softclix Lancets Does not apply Misc 1 Lancet by Finger stick route once daily. 100 each 3    Accu-Chek FastClix Lancets Does not apply Misc 1 Lancet by In Vitro route daily. 100 each 3    hydrocortisone (ANUSOL-HC) 25 MG Rectal Suppos Place 1 suppository (25 mg total) rectally 2 (two) times daily. For 14 days 28 suppository 1     Allergies:Allergies[1]    Objective:   Physical Exam  Constitutional:       Appearance: He is well-developed.   Cardiovascular:      Rate and Rhythm: Normal rate and regular rhythm.      Heart sounds: Normal heart sounds.   Pulmonary:      Effort: Pulmonary effort is normal.      Breath sounds: Normal breath sounds.   Neurological:      Mental Status: He is alert.      Deep Tendon Reflexes: Reflexes are normal and symmetric.         Assessment & Plan:   1. Lumbar stenosis with neurogenic claudication    2. Hypertension  Cpm     Orders Placed This Encounter   Procedures    High Dose Fluzone trivalent influenza, 65yrs+ PFS (59907)       Meds This Visit:  Requested Prescriptions     Signed Prescriptions Disp Refills    diclofenac 75 MG Oral Tab  tablet 0     Sig: Take 1 tablet (75 mg total) by mouth 2 (two) times daily.       Imaging & Referrals:  INFLUENZA VAC HIGH DOSE PRSV FREE         [1] No Known Allergies

## 2025-01-13 DIAGNOSIS — M48.062 LUMBAR STENOSIS WITH NEUROGENIC CLAUDICATION: ICD-10-CM

## 2025-01-16 RX ORDER — DICLOFENAC SODIUM 75 MG/1
75 TABLET, DELAYED RELEASE ORAL 2 TIMES DAILY
Qty: 180 TABLET | Refills: 0 | Status: SHIPPED | OUTPATIENT
Start: 2025-01-16

## 2025-01-16 NOTE — TELEPHONE ENCOUNTER
Refill Passed Per Protocol    Requested Prescriptions   Pending Prescriptions Disp Refills    DICLOFENAC 75 MG Oral Tab EC [Pharmacy Med Name: Diclofenac Sodium Oral Tablet Delayed Release 75 MG] 180 tablet 0     Sig: TAKE 1 TABLET TWICE DAILY       Non-Narcotic Pain Medication Protocol Passed - 1/16/2025 12:10 PM        Passed - In person appointment or virtual visit in the past 6 mos or appointment in next 3 mos     Recent Outpatient Visits              2 months ago Lumbar stenosis with neurogenic claudication    [facility], [facility], Sissy Chew MD    Office Visit    5 months ago Greater trochanteric bursitis of left hip    [facility], Rohan Monaco,     Office Visit    6 months ago Prostate cancer (HCC)    [facility], Osito Romo MD    Office Visit    7 months ago Colon cancer screening    [facility] Martha Schaffer APRN    Office Visit    [facility]    [facility]                      Passed - Medication is active on med list               Recent Outpatient Visits              2 months ago [facility]bar [facility]genic [facility]aud[facility]    [facility] Visit    5 months ago Greater trochanteric bursitis of left hip    [facility], Rohan Monaco,     Office Visit    6 months ago [facility]CC[facility]    Office Visit    7 months ago Colon cancer screening    [facility] Martha Schaffer APRN    Office Visit    7 months ago Prediabetes    [facility]    [facility]  Visit

## 2025-02-14 RX ORDER — HYDROCHLOROTHIAZIDE 25 MG/1
25 TABLET ORAL DAILY
Qty: 90 TABLET | Refills: 3 | OUTPATIENT
Start: 2025-02-14

## 2025-03-12 RX ORDER — LOSARTAN POTASSIUM 100 MG/1
100 TABLET ORAL DAILY
Qty: 90 TABLET | Refills: 3 | Status: SHIPPED | OUTPATIENT
Start: 2025-03-12

## 2025-03-12 RX ORDER — LANCETS
1 EACH MISCELLANEOUS DAILY
Qty: 100 EACH | Refills: 3 | Status: SHIPPED | OUTPATIENT
Start: 2025-03-12

## 2025-03-26 ENCOUNTER — TELEPHONE (OUTPATIENT)
Dept: FAMILY MEDICINE CLINIC | Facility: CLINIC | Age: 70
End: 2025-03-26

## 2025-03-30 DIAGNOSIS — M48.062 LUMBAR STENOSIS WITH NEUROGENIC CLAUDICATION: ICD-10-CM

## 2025-04-02 RX ORDER — DICLOFENAC SODIUM 75 MG/1
75 TABLET, DELAYED RELEASE ORAL 2 TIMES DAILY
Qty: 180 TABLET | Refills: 1 | Status: SHIPPED | OUTPATIENT
Start: 2025-04-02

## 2025-04-25 ENCOUNTER — PATIENT MESSAGE (OUTPATIENT)
Dept: FAMILY MEDICINE CLINIC | Facility: CLINIC | Age: 70
End: 2025-04-25

## 2025-04-25 ENCOUNTER — OFFICE VISIT (OUTPATIENT)
Dept: FAMILY MEDICINE CLINIC | Facility: CLINIC | Age: 70
End: 2025-04-25

## 2025-04-25 ENCOUNTER — LAB ENCOUNTER (OUTPATIENT)
Dept: LAB | Age: 70
End: 2025-04-25
Attending: FAMILY MEDICINE
Payer: MEDICARE

## 2025-04-25 VITALS
DIASTOLIC BLOOD PRESSURE: 69 MMHG | HEART RATE: 55 BPM | HEIGHT: 76 IN | SYSTOLIC BLOOD PRESSURE: 150 MMHG | TEMPERATURE: 99 F | BODY MASS INDEX: 28.11 KG/M2 | WEIGHT: 230.81 LBS | OXYGEN SATURATION: 96 %

## 2025-04-25 DIAGNOSIS — E78.1 PURE HYPERTRIGLYCERIDEMIA: ICD-10-CM

## 2025-04-25 DIAGNOSIS — M54.16 LUMBAR RADICULOPATHY: ICD-10-CM

## 2025-04-25 DIAGNOSIS — I10 ESSENTIAL HYPERTENSION: Primary | ICD-10-CM

## 2025-04-25 DIAGNOSIS — Z82.49 FAMILY HISTORY OF BRAIN ANEURYSM: ICD-10-CM

## 2025-04-25 DIAGNOSIS — G47.33 OBSTRUCTIVE SLEEP APNEA (ADULT) (PEDIATRIC): ICD-10-CM

## 2025-04-25 DIAGNOSIS — K63.5 POLYP OF ASCENDING COLON, UNSPECIFIED TYPE: ICD-10-CM

## 2025-04-25 DIAGNOSIS — C61 PROSTATE CANCER (HCC): ICD-10-CM

## 2025-04-25 DIAGNOSIS — Z00.00 ENCOUNTER FOR ANNUAL HEALTH EXAMINATION: ICD-10-CM

## 2025-04-25 DIAGNOSIS — J43.8 OTHER EMPHYSEMA (HCC): ICD-10-CM

## 2025-04-25 DIAGNOSIS — R91.1 PULMONARY NODULE: ICD-10-CM

## 2025-04-25 DIAGNOSIS — I10 ESSENTIAL HYPERTENSION: ICD-10-CM

## 2025-04-25 DIAGNOSIS — K63.9 NODULE OF COLON: ICD-10-CM

## 2025-04-25 DIAGNOSIS — M48.062 LUMBAR STENOSIS WITH NEUROGENIC CLAUDICATION: ICD-10-CM

## 2025-04-25 DIAGNOSIS — Z83.511 FAMILY HISTORY OF GLAUCOMA IN MATERNAL GRANDMOTHER: ICD-10-CM

## 2025-04-25 PROBLEM — D12.6 TUBULAR ADENOMA OF COLON: Status: RESOLVED | Noted: 2017-10-24 | Resolved: 2025-04-25

## 2025-04-25 LAB
ALBUMIN SERPL-MCNC: 4.1 G/DL (ref 3.2–4.8)
ALBUMIN/GLOB SERPL: 1.5 {RATIO} (ref 1–2)
ALP LIVER SERPL-CCNC: 81 U/L (ref 45–117)
ALT SERPL-CCNC: 30 U/L (ref 10–49)
ANION GAP SERPL CALC-SCNC: 6 MMOL/L (ref 0–18)
AST SERPL-CCNC: 25 U/L (ref ?–34)
BASOPHILS # BLD AUTO: 0.11 X10(3) UL (ref 0–0.2)
BASOPHILS NFR BLD AUTO: 1.2 %
BILIRUB SERPL-MCNC: 0.4 MG/DL (ref 0.2–1.1)
BUN BLD-MCNC: 19 MG/DL (ref 9–23)
BUN/CREAT SERPL: 21.6 (ref 10–20)
CALCIUM BLD-MCNC: 8.8 MG/DL (ref 8.7–10.4)
CHLORIDE SERPL-SCNC: 109 MMOL/L (ref 98–112)
CHOLEST SERPL-MCNC: 145 MG/DL (ref ?–200)
CO2 SERPL-SCNC: 26 MMOL/L (ref 21–32)
CREAT BLD-MCNC: 0.88 MG/DL (ref 0.7–1.3)
DEPRECATED RDW RBC AUTO: 55.5 FL (ref 35.1–46.3)
EGFRCR SERPLBLD CKD-EPI 2021: 93 ML/MIN/1.73M2 (ref 60–?)
EOSINOPHIL # BLD AUTO: 0.37 X10(3) UL (ref 0–0.7)
EOSINOPHIL NFR BLD AUTO: 3.9 %
ERYTHROCYTE [DISTWIDTH] IN BLOOD BY AUTOMATED COUNT: 17.5 % (ref 11–15)
EST. AVERAGE GLUCOSE BLD GHB EST-MCNC: 123 MG/DL (ref 68–126)
FASTING PATIENT LIPID ANSWER: YES
FASTING STATUS PATIENT QL REPORTED: YES
GLOBULIN PLAS-MCNC: 2.7 G/DL (ref 2–3.5)
GLUCOSE BLD-MCNC: 95 MG/DL (ref 70–99)
HBA1C MFR BLD: 5.9 % (ref ?–5.7)
HCT VFR BLD AUTO: 39.2 % (ref 39–53)
HDLC SERPL-MCNC: 42 MG/DL (ref 40–59)
HGB BLD-MCNC: 12.9 G/DL (ref 13–17.5)
IMM GRANULOCYTES # BLD AUTO: 0.03 X10(3) UL (ref 0–1)
IMM GRANULOCYTES NFR BLD: 0.3 %
LDLC SERPL CALC-MCNC: 76 MG/DL (ref ?–100)
LYMPHOCYTES # BLD AUTO: 1.91 X10(3) UL (ref 1–4)
LYMPHOCYTES NFR BLD AUTO: 20.1 %
MCH RBC QN AUTO: 28.2 PG (ref 26–34)
MCHC RBC AUTO-ENTMCNC: 32.9 G/DL (ref 31–37)
MCV RBC AUTO: 85.6 FL (ref 80–100)
MONOCYTES # BLD AUTO: 0.76 X10(3) UL (ref 0.1–1)
MONOCYTES NFR BLD AUTO: 8 %
NEUTROPHILS # BLD AUTO: 6.32 X10 (3) UL (ref 1.5–7.7)
NEUTROPHILS # BLD AUTO: 6.32 X10(3) UL (ref 1.5–7.7)
NEUTROPHILS NFR BLD AUTO: 66.5 %
NONHDLC SERPL-MCNC: 103 MG/DL (ref ?–130)
OSMOLALITY SERPL CALC.SUM OF ELEC: 294 MOSM/KG (ref 275–295)
PLATELET # BLD AUTO: 216 10(3)UL (ref 150–450)
PLATELETS.RETICULATED NFR BLD AUTO: 18.5 % (ref 0–7)
POTASSIUM SERPL-SCNC: 3.7 MMOL/L (ref 3.5–5.1)
PROT SERPL-MCNC: 6.8 G/DL (ref 5.7–8.2)
PSA SERPL-MCNC: <0.04 NG/ML (ref ?–4)
RBC # BLD AUTO: 4.58 X10(6)UL (ref 3.8–5.8)
SODIUM SERPL-SCNC: 141 MMOL/L (ref 136–145)
TRIGL SERPL-MCNC: 156 MG/DL (ref 30–149)
TSI SER-ACNC: 1.86 UIU/ML (ref 0.55–4.78)
VLDLC SERPL CALC-MCNC: 24 MG/DL (ref 0–30)
WBC # BLD AUTO: 9.5 X10(3) UL (ref 4–11)

## 2025-04-25 PROCEDURE — 85025 COMPLETE CBC W/AUTO DIFF WBC: CPT

## 2025-04-25 PROCEDURE — 80053 COMPREHEN METABOLIC PANEL: CPT

## 2025-04-25 PROCEDURE — 84153 ASSAY OF PSA TOTAL: CPT

## 2025-04-25 PROCEDURE — 83036 HEMOGLOBIN GLYCOSYLATED A1C: CPT

## 2025-04-25 PROCEDURE — 84443 ASSAY THYROID STIM HORMONE: CPT

## 2025-04-25 PROCEDURE — 80061 LIPID PANEL: CPT

## 2025-04-25 PROCEDURE — 36415 COLL VENOUS BLD VENIPUNCTURE: CPT

## 2025-04-25 RX ORDER — NEBIVOLOL 5 MG/1
5 TABLET ORAL DAILY
Qty: 90 TABLET | Refills: 1 | Status: SHIPPED | OUTPATIENT
Start: 2025-04-25

## 2025-04-25 NOTE — PROGRESS NOTES
Subjective:   Candelario Louis Jr. is a 69 year old male who presents for a MA AHA (Medicare Advantage Annual Health Assessment) and Medicare Subsequent Annual Wellness visit (Pt already had Initial Annual Wellness) and stable chronic illnesses (addressed in visit).   History of Present Illness              History/Other:   Fall Risk Assessment:   He has been screened for Falls and is High Risk. Fall Prevention information provided to patient in After Visit Summary.    Do you feel unsteady when standing or walking?: No  Do you worry about falling?: No  Have you fallen in the past year?: Yes  How many times have you fallen?: 1  Were you injured?: No     Cognitive Assessment:   He had a completely normal cognitive assessment - see flowsheet entries     Functional Ability/Status:   Candelario Louis Jr. has some abnormal functions as listed below:  He has Vision problems based on screening of functional status.       Depression Screening (PHQ):  PHQ-2 SCORE: 0  , done 4/25/2025   Last [place] Suicide Screening on 4/25/2025 was No Risk.     5 minutes spent screening and counseling for depression    Advanced Directives:   He does NOT have a Living Will. [Do you have a living will?: No]  He does NOT have a Power of  for Health Care. [Do you have a healthcare power of ?: No]  Not discussed      Problem List[1]  Allergies:  He has no known allergies.    Current Medications:  Active Meds, Sig Only[2]    Medical History:  He  has a past medical history of Age-related nuclear cataract of both eyes (11/19/2020), Age-related nuclear cataract of both eyes (11/19/2020), Age-related nuclear cataract of both eyes (11/19/2020), Anal fissure, Body mass index (BMI) of 31.0-31.9 in adult (12/14/2019), Body mass index (BMI) of 31.0-31.9 in adult (12/14/2019), Cancer (HCC) (2018), Cluster headaches (11/21/2017), Cluster headaches (11/21/2017), Colon polyp (2024), COPD (chronic obstructive pulmonary disease) ([facility]), Diabetes  (HCC) (5/2019), Esophageal reflux, Essential hypertension, Family history of glaucoma in maternal grandmother (11/19/2020), Fatty liver (06/09/2020), Hemorrhoids, High blood pressure, Lumbar stenosis with neurogenic claudication (12/18/2023), Normal esophagogastroduodenoscopy (EGD) (10/11/2024), Prostate cancer (HCC) (03/14/2018), Prostate cancer (HCC) (03/23/2018), Prostate cancer (HCC) (03/23/2018), PSA elevation, Sleep apnea, Thrombocytopenia (03/17/2020), Thrombocytopenia (03/17/2020), Tobacco abuse (10/24/2017), Type 2 diabetes mellitus with hyperglycemia, without long-term current use of insulin (HCC) (03/17/2020), Visual impairment, Vitamin D deficiency (11/21/2017), and Vitamin D deficiency (11/21/2017).  Surgical History:  He  has a past surgical history that includes colonoscopy; other surgical history (Left); and other surgical history (2018).   Family History:  His family history includes Cancer in his mother; Diabetes in his maternal grandmother, mother, and paternal grandfather; Glaucoma in his maternal grandmother and mother; Hypertension in his father.  Social History:  He  reports that he has been smoking cigarettes. He has a 20 pack-year smoking history. He has never used smokeless tobacco. He reports current alcohol use of about 6.0 standard drinks of alcohol per week. He reports current drug use. Drug: Cannabis.    Tobacco:  Tobacco Use[3]  E-Cigarettes/Vaping       Questions Responses    E-Cigarette Use Never User           Tobacco cessation counseling for <3 minutes.      CAGE Alcohol Screen:   CAGE screening score of 0 on 4/25/2025, showing low risk of alcohol abuse.      Patient Care Team:  Sissy Concepcion MD as PCP - General (Family Medicine)  Good Romeo MD (Radiation Oncology)  Garrison Shaffer MD (Radiation Oncology)  Shae Phillips, Marleen Huffman, PT as Physical Therapist (Physical Therapy)  Ernie Kaba PT as Physical Therapist (Physical  Therapy)    Review of Systems  GENERAL: feels well otherwise  SKIN: denies any unusual skin lesions  EYES: denies blurred vision or double vision  HEENT: denies nasal congestion, sinus pain or ST  LUNGS: denies shortness of breath with exertion  CARDIOVASCULAR: denies chest pain on exertion  GI: denies abdominal pain, denies heartburn  : 1 per night nocturia, no complaint of urinary incontinence  MUSCULOSKELETAL: denies back pain  NEURO: denies headaches  PSYCHE: denies depression or anxiety  HEMATOLOGIC: denies hx of anemia  ENDOCRINE: denies thyroid history  ALL/ASTHMA: denies hx of allergy or asthma    Objective:   Physical Exam  General Appearance:  Alert, cooperative, no distress, appears stated age   Head:  Normocephalic, without obvious abnormality, atraumatic   Eyes:  PERRL, conjunctiva/corneas clear, EOM's intact, both eyes   Ears:  Normal TM's and external ear canals, both ears   Nose: Nares normal, septum midline, mucosa normal, no drainage or sinus tenderness   Throat: Lips, mucosa, and tongue normal; teeth and gums normal   Neck: Supple, symmetrical, trachea midline, no adenopathy, thyroid: not enlarged, symmetric, no tenderness/mass/nodules, no carotid bruit or JVD   Back:   Symmetric, no curvature, ROM normal, no CVA tenderness   Lungs:   Clear to auscultation bilaterally, respirations unlabored   Chest Wall:  No tenderness or deformity   Heart:  Regular rate and rhythm, S1, S2 normal, no murmur, rub or gallop   Abdomen:   Soft, non-tender, bowel sounds active all four quadrants,  no masses, no organomegaly                                 /69   Pulse 55   Temp 98.6 °F (37 °C) (Temporal)   Ht 6' 4\" (1.93 m)   Wt 230 lb 12.8 oz (104.7 kg)   SpO2 96%   BMI 28.09 kg/m²  Estimated body mass index is 28.09 kg/m² as calculated from the following:    Height as of this encounter: 6' 4\" (1.93 m).    Weight as of this encounter: 230 lb 12.8 oz (104.7 kg).    Medicare Hearing Assessment:   Hearing  Screening    Screening Method: Questionnaire  I have a problem hearing over the telephone: No I have trouble following the conversations when two or more people are talking at the same time: No   I have trouble understanding things on the TV: No I have to strain to understand conversations: No   I have to worry about missing the telephone ring or doorbell: No I have trouble hearing conversations in a noisy background such as a crowded room or restaurant: No   I get confused about where sounds come from: No I misunderstand some words in a sentence and need to ask people to repeat themselves: No   I especially have trouble understanding the speech of women and children: No I have trouble understanding the speaker in a large room such as at a meeting or place of [employer]: No   Many people I talk to seem to mumble (or don't speak clearly): No People get annoyed because I misunderstand what they say: No   I misunderstand what others are saying and make inappropriate responses: No I avoid social activities because I cannot hear well and fear I will reply improperly: No   Family members and friends have told me they think I may have hearing loss: No                   Assessment & Plan:   Candelario Louis Jr. is a 69 year old male who presents for a Medicare Assessment.     1. Essential hypertension (Primary)  Uncontrolled add bystolic  -     Comp Metabolic Panel (14); Future; Expected date: 04/25/2025  -     Hemoglobin A1C; Future; Expected date: 04/25/2025  -     Assay, Thyroid Stim Hormone; Future; Expected date: 04/25/2025  -     CBC With Differential With Platelet; Future; Expected date: 04/25/2025  2. Family history of brain aneurysm  Had mra in the past was negative  3. Family history of glaucoma in maternal grandmother  Needs to see ophthalmologist  -     Ophthalmology Referral - In Network  4. Lumbar radiculopathy  Better cpm  5. Lumbar stenosis with neurogenic claudication  Better cpm  6. Nodule of colon  Had  colonoscopy repeat in 5 year  7. Obstructive sleep apnea (adult) (pediatric)  Stable cpm  8. Other emphysema (HCC)  Stable cpm  Overview:      9. Polyp of ascending colon, unspecified type  Needs colonoscopy in 5 years  10. Pulmonary nodule  Stable no follow up needed  Overview:  5/2020  CONCLUSION:   1. Small bilateral solid and ground-glass density lung nodules measuring up to 4-5 mm.  These appear similar in size and morphology dating to index chest CT in April, 2018.  As there is mild emphysema, continued 12 month surveillance imaging is   suggested.  2. Severe fatty liver.          Remote - PS     Dictated by (CST): Jaden Gardiner MD on 5/26/2020 at 9:43 AM       11. Pure hypertriglyceridemia  Controlled cpm  -     Lipid Panel; Future; Expected date: 04/25/2025  12. Prostate cancer (HCC)  In remission     -     PSA Total, Diagnostic; Future; Expected date: 04/25/2025  Other orders  -     Nebivolol HCl; Take 1 tablet (5 mg total) by mouth daily.  Dispense: 90 tablet; Refill: 1  [unfilled]            The patient indicates understanding of these issues and agrees to the plan.  Reinforced healthy diet, lifestyle, and exercise.      Return in about 2 months (around 6/25/2025).     Sissy Concepcion MD, 4/25/2025     Supplementary Documentation:   General Health:  In the past six months, have you lost more than 10 pounds without trying?: 2 - No  Has your appetite been poor?: No  Type of Diet: Balanced  How does the patient maintain a good energy level?: Appropriate Exercise  How would you describe your daily physical activity?: Moderate  How would you describe your current health state?: Good  How do you maintain positive mental well-being?: Social Interaction  On a scale of 0 to 10, with 0 being no pain and 10 being severe pain, what is your pain level?: 4 - (Moderate)  In the past six months, have you experienced urine leakage?: 0-No  At any time do you feel concerned for the safety/well-being of yourself and/or  your children, in your home or elsewhere?: No  Have you had any immunizations at another office such as Influenza, Hepatitis B, Tetanus, or Pneumococcal?: No    Health Maintenance   Topic Date Due    Lung Cancer Screening  Never done    Zoster Vaccines (1 of 2) 02/09/2016    Annual Well Visit  01/01/2025    Tobacco Cessation Counseling  01/01/2025    HTN: BP Follow-Up  05/25/2025    COVID-19 Vaccine (10 - 2024-25 season) 07/04/2025    PSA  05/25/2026    Colorectal Cancer Screening  10/11/2029    Influenza Vaccine  Completed    Annual Depression Screening  Completed    Fall Risk Screening (Annual)  Completed    Pneumococcal Vaccine: 50+ Years  Completed    Meningococcal B Vaccine  Aged Out            [1]   Patient Active Problem List  Diagnosis    Essential hypertension    Tobacco abuse    Family history of brain aneurysm    Cluster headaches    Vitamin D deficiency    Prostate cancer (HCC)    Pulmonary nodule    Body mass index (BMI) of 31.0-31.9 in adult    Thrombocytopenia    Pure hypertriglyceridemia    Fatty liver    Other emphysema (HCC)    Age-related nuclear cataract of both eyes    Family history of glaucoma in maternal grandmother    Lumbar stenosis with neurogenic claudication    Apnea    Obstructive sleep apnea (adult) (pediatric)    Type 2 diabetes mellitus with hyperglycemia, without long-term current use of insulin (HCC)    Nodule of colon    Polyp of ascending colon    Normal esophagogastroduodenoscopy (EGD)   [2]   Outpatient Medications Marked as Taking for the 4/25/25 encounter (Office Visit) with Sissy Concepcion MD   Medication Sig    nebivolol 5 MG Oral Tab Take 1 tablet (5 mg total) by mouth daily.    diclofenac 75 MG Oral Tab EC Take 1 tablet (75 mg total) by mouth 2 (two) times daily.    Accu-Chek Softclix Lancets Does not apply Misc 1 Lancet by Finger stick route daily.    losartan 100 MG Oral Tab Take 1 tablet (100 mg total) by mouth daily.    pantoprazole 40 MG Oral Tab EC Take 1 tablet (40  mg total) by mouth before breakfast.    atorvastatin 20 MG Oral Tab Take 1 tablet (20 mg total) by mouth nightly.    hydroCHLOROthiazide 25 MG Oral Tab Take 1 tablet (25 mg total) by mouth daily.    GABAPENTIN 300 MG Oral Cap TAKE 2 CAPSULES THREE TIMES DAILY    amLODIPine 5 MG Oral Tab Take 1 tablet (5 mg total) by mouth daily.    Multiple Vitamins-Minerals (MENS MULTI VITAMIN & MINERAL) Oral Tab Take by mouth.    acetaminophen 500 MG Oral Tab Take 1 tablet (500 mg total) by mouth every 8 (eight) hours as needed for Pain.   [3]   Social History  Tobacco Use   Smoking Status Every Day    Current packs/day: 0.50    Average packs/day: 0.5 packs/day for 40.0 years (20.0 ttl pk-yrs)    Types: Cigarettes   Smokeless Tobacco Never

## 2025-04-28 NOTE — TELEPHONE ENCOUNTER
Please review.  Protocol failed / Has no protocol.    Should patient still be testing?  The original prescription was discontinued on 4/25/2025 by Ghazal Llanes CMA. Renewing this prescription may not be appropriate.        Requested Prescriptions   Pending Prescriptions Disp Refills    ACCU-CHEK GUIDE TEST In Vitro Strip [Pharmacy Med Name: Accu-Chek Guide Test In Vitro Strip] 100 strip 3     Sig: TEST BLOOD SUGAR EVERY DAY       Diabetic Supplies Protocol Failed - 4/28/2025  9:48 AM        Failed - Medication is active on med list        Passed - In person appointment or virtual visit in the past 12 mos or appointment in next 3 mos

## 2025-05-05 RX ORDER — PYRIDOXINE HCL (VITAMIN B6) 50 MG
1 TABLET ORAL DAILY
Qty: 90 TABLET | Refills: 1 | Status: SHIPPED | OUTPATIENT
Start: 2025-05-05 | End: 2025-06-04

## 2025-05-05 RX ORDER — BLOOD SUGAR DIAGNOSTIC
1 STRIP MISCELLANEOUS DAILY
Qty: 100 STRIP | Refills: 3 | Status: SHIPPED | OUTPATIENT
Start: 2025-05-05

## 2025-05-27 RX ORDER — ATORVASTATIN CALCIUM 20 MG/1
20 TABLET, FILM COATED ORAL NIGHTLY
Qty: 90 TABLET | Refills: 3 | Status: SHIPPED | OUTPATIENT
Start: 2025-05-27

## 2025-05-27 NOTE — TELEPHONE ENCOUNTER
Refill passes per [facility] protocol.    No future appointments with primary care medicine.

## 2025-07-07 ENCOUNTER — TELEPHONE (OUTPATIENT)
Dept: FAMILY MEDICINE CLINIC | Facility: CLINIC | Age: 70
End: 2025-07-07

## 2025-07-07 DIAGNOSIS — M48.062 LUMBAR STENOSIS WITH NEUROGENIC CLAUDICATION: ICD-10-CM

## 2025-07-08 NOTE — TELEPHONE ENCOUNTER
Refill Request    LAST GFR: 04/25/2025; 93. PROTOCOL PASSED.     Medication request: GABAPENTIN 300 MG Oral Cap. TAKE 2 CAPSULES THREE TIMES DAILY     LOV: 7/30/2024 Rohan Yu, DO   Due back to clinic per last office note:  Per Dr. Yu: \"For diagnostic and therapeutic purposes I recommend left trochanteric bursa steroid injection under ultrasound guidance. If no better we will have him do x-rays of the left hip and have him follow-up in clinic for reevaluation. He is contact me in 1 week's time and let me know how he is doing after the trochanteric bursa injection.\"   NOV: Visit date not found      [facility]/Last refill: 04/01/2025 #360 - 60 day supply per [facility]    Urine drug screen (if applicable): n/a  Pain contract: n/a    LOV plan (if weaning or changing medications): Gabapentin not mentioned in LOV note.

## 2025-07-09 RX ORDER — GABAPENTIN 300 MG/1
600 CAPSULE ORAL 3 TIMES DAILY
Qty: 540 CAPSULE | Refills: 0 | Status: SHIPPED | OUTPATIENT
Start: 2025-07-09

## 2025-07-09 NOTE — TELEPHONE ENCOUNTER
Per : \"90 day refill prescribed; needs follow up within the next 90 days if I am to give another refill in the fall. \"    Message sent to patient via [employer] regarding need for follow up appointment.

## 2025-08-07 ENCOUNTER — TELEPHONE (OUTPATIENT)
Dept: PHYSICAL MEDICINE AND REHAB | Facility: CLINIC | Age: 70
End: 2025-08-07

## 2025-08-07 ENCOUNTER — OFFICE VISIT (OUTPATIENT)
Dept: PHYSICAL MEDICINE AND REHAB | Facility: CLINIC | Age: 70
End: 2025-08-07

## 2025-08-07 VITALS — HEIGHT: 76 IN | WEIGHT: 230 LBS | BODY MASS INDEX: 28.01 KG/M2

## 2025-08-07 DIAGNOSIS — M48.062 LUMBAR STENOSIS WITH NEUROGENIC CLAUDICATION: ICD-10-CM

## 2025-08-07 DIAGNOSIS — M70.62 GREATER TROCHANTERIC BURSITIS OF LEFT HIP: Primary | ICD-10-CM

## 2025-08-07 PROCEDURE — 99214 OFFICE O/P EST MOD 30 MIN: CPT | Performed by: PHYSICAL MEDICINE & REHABILITATION

## 2025-08-07 PROCEDURE — 1159F MED LIST DOCD IN RCRD: CPT | Performed by: PHYSICAL MEDICINE & REHABILITATION

## 2025-08-07 PROCEDURE — 3008F BODY MASS INDEX DOCD: CPT | Performed by: PHYSICAL MEDICINE & REHABILITATION

## 2025-08-07 PROCEDURE — 20611 DRAIN/INJ JOINT/BURSA W/US: CPT | Performed by: PHYSICAL MEDICINE & REHABILITATION

## 2025-08-07 PROCEDURE — 1160F RVW MEDS BY RX/DR IN RCRD: CPT | Performed by: PHYSICAL MEDICINE & REHABILITATION

## 2025-08-07 RX ORDER — GABAPENTIN 300 MG/1
600 CAPSULE ORAL 3 TIMES DAILY
Qty: 540 CAPSULE | Refills: 3 | Status: SHIPPED | OUTPATIENT
Start: 2025-08-07

## 2025-08-07 RX ORDER — LIDOCAINE HYDROCHLORIDE 10 MG/ML
4 INJECTION, SOLUTION INFILTRATION; PERINEURAL ONCE
Status: COMPLETED | OUTPATIENT
Start: 2025-08-07 | End: 2025-08-07

## 2025-08-07 RX ORDER — TRIAMCINOLONE ACETONIDE 40 MG/ML
40 INJECTION, SUSPENSION INTRA-ARTICULAR; INTRAMUSCULAR ONCE
Status: COMPLETED | OUTPATIENT
Start: 2025-08-07 | End: 2025-08-07

## 2025-08-07 RX ADMIN — TRIAMCINOLONE ACETONIDE 40 MG: 40 INJECTION, SUSPENSION INTRA-ARTICULAR; INTRAMUSCULAR at 10:38:00

## 2025-08-07 RX ADMIN — LIDOCAINE HYDROCHLORIDE 4 ML: 10 INJECTION, SOLUTION INFILTRATION; PERINEURAL at 10:37:00

## (undated) DIAGNOSIS — M48.062 LUMBAR STENOSIS WITH NEUROGENIC CLAUDICATION: ICD-10-CM

## (undated) DIAGNOSIS — M48.062 LUMBAR STENOSIS WITH NEUROGENIC CLAUDICATION: Primary | ICD-10-CM

## (undated) NOTE — LETTER
AUTHORIZATION FOR SURGICAL OPERATION OR OTHER PROCEDURE    1. I hereby authorize Dr. Rohan Yu and the [facility] staff assigned to my case to perform the following operation and/or procedure at the [facility]:    Left trochanteric bursa steroid injection under US guidance     2.  My physician has explained the nature and purpose of the operation or other procedure, possible alternative methods of treatment, the risks involved, and the possibility of complication to me.  I acknowledge that no guarantee has been made as to the result that may be obtained.  3.  I recognize that, during the course of this operation, or other procedure, unforseen conditions may necessitate additional or different procedure than those listed above.  I, therefore, further authorize and request that the above named physician, his/her physician assistants or designees perform such procedures as are, in his/her professional opinion, necessary and desirable.  4.  Any tissue or organs removed in the operation or other procedure may be disposed of by and at the discretion of the [facility] staff and [facility].  5.  I understand that in the event of a medical emergency, I will be transported by local paramedics to [facility] or other hospital emergency department.  6.  I certify that I have read and fully understand the above consent to operation and/or other procedure.    7.  I acknowledge that my physician has explained sedation/analgesia administration to me including the risks and benefits.  I consent to the administration of sedation/analgesia as may be necessary or desirable in the judgement of my physician.    Witness signature: ___________________________________________________ Date:  ______/______/_____                    Time:  ________ A.M.  P.M.       Patient Name: Candelario Louis Jr.  8/29/1955  [id number]       Patient signature:   ___________________________________________________        Statement of Physician  My signature below affirms that prior to the time of the procedure, I have explained to the patient and/or his/her guardian, the risks and benefits involved in the proposed treatment and any reasonable alternative to the proposed treatment.  I have also explained the risks and benefits involved in the refusal of the proposed treatment and have answered the patient's questions.                        Date:  ______/______/_______  Provider                      Signature:  __________________________________________________________       Time:  ___________ AERIC ARRIOLA

## (undated) NOTE — LETTER
[address] [place] [address]  [id number]  Phone: [phone]  Fax: [phone]       Hello,     This is the [facility], office of Dr. Sissy Concepcion.    Thank you for putting your trust in [facility].  Our goal is to deliver the highest quality healthcare and an exceptional patient experience. Review of your medical record shows you are due for the following:         Annual Medicare Physical         Please call [phone] to schedule your appointment or schedule online via [employer].     If you changed to a new provider at another facility, please notify the clinic to update your records.    If you had any recent testing at another facility, please have your results faxed to our office at [phone].     Thank you and have a great day!

## (undated) NOTE — LETTER
9/4/2019          To Whom It May Concern:    Mame Ponce is currently under my medical care and may not return to work at this time. Please excuse [facility] for 6 days. He may return to work on 09/09/2019.       If you require additional informa

## (undated) NOTE — LETTER
[address], [address] [facility]  Dept: [phone]    Migue Herringix, DO  Physical Medicine    Injection Instructions    We will check with your insurance company for pre-authorization. hydrocodone found in Vicodin, Lortab, Tylenol and Ultram.  II. Most injections are done with local anesthetics. Some injections may require sedation. Hold food and drink if having IV sedation for the injection.  [facility] will give [facility] details batsheva

## (undated) NOTE — LETTER
[address]   Date:   1/20/2020     Name:   Tressa Ceja    YOB: 1955   MRN:   [id number]       WHERE IS YOUR PAIN NOW? Felipe the areas on your body where you feel the described sensations.   Use the appropriate

## (undated) NOTE — LETTER
[address], 016 [facility]  Dept: [phone]    Dalton Deleon, DO  Physical Medicine    Injection Instructions    We will check with your insurance company for pre-authorization. hydrocodone found in Vicodin, Lortab, Tylenol and Ultram.  II. Most injections are done with local anesthetics. Some injections may require sedation. Hold food and drink if having IV sedation for the injection.  [facility] will give you details batsheva

## (undated) NOTE — LETTER
[address] [facility]   Date:   4/5/2022     Name:   Hilliard Heimlich    YOB: 1955   MRN:   [id number]       WHERE IS YOUR PAIN NOW? Felipe the areas on your body where you feel the described sensations. Use the appropriate symbol. Tereasa Jakub the areas of radiation. Include all affected areas. Just to complete the picture, please draw in the face. ACHE:  ^ ^ ^   NUMBNESS:  0000   PINS & NEEDLES:  = = = =                              ^ ^ ^                       0000              = = = =                                    ^ ^ ^                       0000            = = = =      BURNING:  XXXX   STABBING: ////                  XXXX                ////                         XXXX          ////     Please felipe the line below indicating your degree of pain right now  with 0 being no pain 10 being the worst pain possible.                                          0             1             2              3             4              5              6              7             8             9             10         Patient Signature:

## (undated) NOTE — LETTER
September 15, 2020         Baldo Keys, [facility]      Patient: Lexus Mena   YOB: 1955   Date of Visit: 9/15/2020       Dear Dr. Maribell Szymanski MD,    I saw your patient, Lexus Mena, on 9/15/2

## (undated) NOTE — LETTER
[address]   Date:   11/19/2019     Name:   Shruthi Domingo    YOB: 1955   MRN:   [id number]       WHERE IS YOUR PAIN NOW? Felipe the areas on your body where you feel the described sensations.   Use the appropriat

## (undated) NOTE — LETTER
AUTHORIZATION FOR SURGICAL OPERATION OR OTHER PROCEDURE    1.  I hereby authorize Dr. Tom Muniz and the [facility] staff assigned to my case to perform the following operation and/or procedure at the [facility]:    Right subacromial injection under US g Relationship to Patient:           []  Parent    Responsible person                          []  Spouse  In case of minor or                    [] Other  _____________   Incompetent name:  __________________________________________________

## (undated) NOTE — LETTER
[facility] [address] , [place]   Date:   3/30/2023     Name:   Johnson Campa. YOB: 1955   MRN:   [id number]       WHERE IS YOUR PAIN NOW? Ana the areas on your body where you feel the described sensations. Use the appropriate symbol. Srini Isabel the areas of radiation. Include all affected areas. Just to complete the picture, please draw in the face. ACHE:  ^ ^ ^   NUMBNESS:  0000   PINS & NEEDLES:  = = = =                              ^ ^ ^                       0000              = = = =                                    ^ ^ ^                       0000            = = = =      BURNING:  XXXX   STABBING: ////                  XXXX                ////                         XXXX          ////     Please ana the line below indicating your degree of pain right now  with 0 being no pain 10 being the worst pain possible.                                          0             1             2              3             4              5              6              7             8             9             10         Patient Signature:

## (undated) NOTE — LETTER
[address], [address] [facility]  Dept: [phone]    Tom Muniz, DO  Physical Medicine    Injection Instructions    We will check with your insurance company for pre-authorization. hydrocodone found in Vicodin, Lortab, Tylenol and Ultram.  II. Most injections are done with local anesthetics. Some injections may require sedation. Hold food and drink if having IV sedation for the injection.  [facility] will give you details batsheva

## (undated) NOTE — LETTER
[facility] SURGERY SCHEDULING FORM   [address] [facility]   [phone] (scheduling phone) [phone] (scheduling fax)     PATIENT INFORMATION   Last Name:      Laura Arenas      First Name:    [facility] []  No or using our own   Allergies: Patient has no known allergies.          Completed by:    Duane Royal      Date:    2/26/2021

## (undated) NOTE — LETTER
AUTHORIZATION FOR SURGICAL OPERATION OR OTHER PROCEDURE    1.  I hereby authorize Dr. Norberto Pantoja and the [facility] staff assigned to my case to perform the following operation and/or procedure at the [facility]:    Left intra-articular hip joint injecti Relationship to Patient:           []  Parent    Responsible person                          []  Spouse  In case of minor or                    [] Other  _____________   Incompetent name:  __________________________________________________

## (undated) NOTE — LETTER
[address] [facility]   Date:   12/14/2021     Name:   Alecia Pereira    YOB: 1955   MRN:   [id number]       WHERE IS YOUR PAIN NOW?   Felipe the areas on your body where you feel the de

## (undated) NOTE — LETTER
[address]   Date:   10/28/2019     Name:   Rena Diaz    YOB: 1955   MRN:   [id number]       WHERE IS YOUR PAIN NOW? Felipe the areas on your body where you feel the described sensations.   Use the appropriat

## (undated) NOTE — LETTER
[address]   Date:   2/25/2021     Name:   Echo Cummings    YOB: 1955   MRN:   [id number]       WHERE IS YOUR PAIN NOW? Felipe the areas on your body where you feel the described sensations.   Use the appropriate

## (undated) NOTE — LETTER
[facility] SURGERY SCHEDULING FORM   [address] [facility]   [phone] (scheduling phone) [phone] (scheduling fax)     PATIENT INFORMATION   Last Name:      Belgica Reaves      First Name:    [facility] [x]  Yes  []  No or using our own   Allergies: Patient has no known allergies.          Completed by:    Mallorie Jacques      Date:    9/28/2020

## (undated) NOTE — LETTER
Date & Time: 10/28/2018, 3:15 PM  Patient: Mindy Flores  Encounter Provider(s):    Dashawn Fishman MD       To Whom It May Concern:    Salvador Sosa was seen and treated in our department on 10/28/2018.  He should not return to work until 10/30

## (undated) NOTE — LETTER
[address]   Date:   4/7/2021     Name:   Adalberto Guy    YOB: 1955   MRN:   [id number]       WHERE IS YOUR PAIN NOW? Felipe the areas on your body where you feel the described sensations.   Use the appropriate

## (undated) NOTE — LETTER
[facility] SURGERY SCHEDULING FORM   [address] [facility]   [phone] (scheduling phone) [phone] (scheduling fax)     PATIENT INFORMATION   Last Name:      Tilda Apgar      First Name:    [facility] [x]  Yes  []  No or using our own   Allergies: Patient has no known allergies.          Completed by:    Robin Smart      Date:    12/5/2019

## (undated) NOTE — LETTER
[facility] SURGERY SCHEDULING FORM   [address] [place] [address]   [phone] (scheduling phone) [phone] (scheduling fax)     PATIENT INFORMATION   Last Name:      Hugo Shannon      First Name:    [facility] Allergies: Patient has no known allergies.          Completed by:    Karoline Alexis      Date:    10/2/2019

## (undated) NOTE — ED AVS SNAPSHOT
Crispin Walters   MRN: [id number]    Department:  [facility] Emergency Department   Date of Visit:  10/28/2018           Disclosure     Insurance plans vary and the physician(s) referred by the ER may not be covered by your plan.  Please cont CARE PHYSICIAN AT ONCE OR RETURN IMMEDIATELY TO THE EMERGENCY DEPARTMENT. If you have been prescribed any medication(s), please fill your prescription right away and begin taking the medication(s) as directed.   If you believe that any of the medications

## (undated) NOTE — LETTER
[address] [facility]   Date:   11/18/2021     Name:   Shruthi Domingo    YOB: 1955   MRN:   [id number]       WHERE IS YOUR PAIN NOW?   Felipe the areas on your body where you feel the de

## (undated) NOTE — LETTER
22      Reno Arrieta  :  1955        Appeal for epidural steroid injection        To Whom It May Concern:    I have been seeing this patient since 2019 for lower back pain with radicular features, with intermittent radiation i tried multiple medications over the years, and he has also done quite a bit of physical therapy, though he still has episodes of radicular pain despite the PT and a home exercise program. I ask that you reconsider approval for epidural injection that was d

## (undated) NOTE — LETTER
[address] [facility]   Date:   11/1/2022     Name:   Johnson Campa. YOB: 1955   MRN:   [id number]       WHERE IS YOUR PAIN NOW? Ana the areas on your body where you feel the described sensations. Use the appropriate symbol. Srini Isabel the areas of radiation. Include all affected areas. Just to complete the picture, please draw in the face. ACHE:  ^ ^ ^   NUMBNESS:  0000   PINS & NEEDLES:  = = = =                              ^ ^ ^                       0000              = = = =                                    ^ ^ ^                       0000            = = = =      BURNING:  XXXX   STABBING: ////                  XXXX                ////                         XXXX          ////     Please ana the line below indicating your degree of pain right now  with 0 being no pain 10 being the worst pain possible.                                          0             1             2              3             4              5              6              7             8             9             10         Patient Signature:

## (undated) NOTE — LETTER
[address] [facility]   Date:   7/12/2022     Name:   Adelfo Patterson. YOB: 1955   MRN:   [id number]       WHERE IS YOUR PAIN NOW? Felipe the areas on your body where you feel the described sensations. Use the appropriate symbol. Gordon Rios the areas of radiation. Include all affected areas. Just to complete the picture, please draw in the face. ACHE:  ^ ^ ^   NUMBNESS:  0000   PINS & NEEDLES:  = = = =                              ^ ^ ^                       0000              = = = =                                    ^ ^ ^                       0000            = = = =      BURNING:  XXXX   STABBING: ////                  XXXX                ////                         XXXX          ////     Please felipe the line below indicating your degree of pain right now  with 0 being no pain 10 being the worst pain possible.                                          0             1             2              3             4              5              6              7             8             9             10         Patient Signature:

## (undated) NOTE — LETTER
[facility] SURGERY SCHEDULING FORM   [address] [place] [address]   [phone] (scheduling phone) [phone] (scheduling fax)     PATIENT INFORMATION   Last Name:      Myrna Aviles      First Name:    On license of [facility] [x]  Yes  []  No or using our own   Allergies: Patient has no known allergies.          Completed by:    Chioma Sanchez      Date:    6/11/2020

## (undated) NOTE — LETTER
AUTHORIZATION FOR SURGICAL OPERATION OR OTHER PROCEDURE    1. I hereby authorize Dr. Laila Shaw, and [place] [employer] [facility] staff assigned to my case to perform the following operation and/or procedure at the [facility]:    acupuncture  _______________________________________________________________________________________________      _______________________________________________________________________________________________    2. My physician has explained the nature and purpose of the operation or other procedure, possible alternative methods of treatment, the risks involved, and the possibility of complication to me. I acknowledge that no guarantee has been made as to the result that may be obtained. 3.  I recognize that, during the course of this operation, or other procedure, unforseen conditions may necessitate additional or different procedure than those listed above. I, therefore, further authorize and request that the above named physician, his/her physician assistants or designees perform such procedures as are, in his/her professional opinion, necessary and desirable. 4.  Any tissue or organs removed in the operation or other procedure may be disposed of by and at the discretion of the [facility], [facility] and [facility]. 5.  I understand that in the event of a medical emergency, I will be transported by local paramedics to [facility] or other [facility] emergency department. 6.  I certify that I have read and fully understand the above consent to operation and/or other procedure. 7.  I acknowledge that my physician has explained sedation/analgesia administration to me including the risks and benefits. I consent to the administration of sedation/analgesia as may be necessary or desirable in the judgement of my physician. Witness signature: ___________________________________________________ Date:  ______/______/_____                    Time:  ________ A. M. P.M.       Patient Name:  ______________________________________________________  (please print)      Patient signature:  ___________________________________________________             Relationship to Patient:           []  Parent    Responsible person                          []  Spouse  In case of minor or                    [] Other  _____________   Incompetent name:  __________________________________________________                               (please print)      _____________      Responsible person  In case of minor or  Incompetent signature:  _______________________________________________    Statement of Physician  My signature below affirms that prior to the time of the procedure, I have explained to the patient and/or his/her guardian, the risks and benefits involved in the proposed treatment and any reasonable alternative to the proposed treatment. I have also explained the risks and benefits involved in the refusal of the proposed treatment and have answered the patient's questions.                         Date:  ______/______/_______  Provider                      Signature:  __________________________________________________________       Time:  ___________ A.M    P.M.

## (undated) NOTE — LETTER
[facility] [address] , [place]   Date:   11/30/2023     Name:   Johan Barajas. YOB: 1955   MRN:   [id number]       WHERE IS YOUR PAIN NOW? Felipe the areas on your body where you feel the described sensations. Use the appropriate symbol. Larance Saltness the areas of radiation. Include all affected areas. Just to complete the picture, please draw in the face. ACHE:  ^ ^ ^   NUMBNESS:  0000   PINS & NEEDLES:  = = = =                              ^ ^ ^                       0000              = = = =                                    ^ ^ ^                       0000            = = = =      BURNING:  XXXX   STABBING: ////                  XXXX                ////                         XXXX          ////     Please felipe the line below indicating your degree of pain right now  with 0 being no pain 10 being the worst pain possible.                                          0             1             2              3             4              5              6              7             8             9             10         Patient Signature:

## (undated) NOTE — LETTER
[address]   Date:   10/2/2019     Name:   Enoch Calvo    YOB: 1955   MRN:   [id number]       WHERE IS YOUR PAIN NOW? Felipe the areas on your body where you feel the described sensations.   Use the appropriate

## (undated) NOTE — LETTER
[address]   Date:   9/28/2020     Name:   Dane Piedra    YOB: 1955   MRN:   [id number]       WHERE IS YOUR PAIN NOW? Felipe the areas on your body where you feel the described sensations.   Use the appropriate

## (undated) NOTE — LETTER
AUTHORIZATION FOR SURGICAL OPERATION OR OTHER PROCEDURE    1. I hereby authorize Dr. Marcelino Bhatia, and [place] [employer] [facility] staff assigned to my case to perform the following operation and/or procedure at the [facility]:    acupuncture  _______________________________________________________________________________________________      _______________________________________________________________________________________________    2. My physician has explained the nature and purpose of the operation or other procedure, possible alternative methods of treatment, the risks involved, and the possibility of complication to me. I acknowledge that no guarantee has been made as to the result that may be obtained. 3.  I recognize that, during the course of this operation, or other procedure, unforseen conditions may necessitate additional or different procedure than those listed above. I, therefore, further authorize and request that the above named physician, his/her physician assistants or designees perform such procedures as are, in his/her professional opinion, necessary and desirable. 4.  Any tissue or organs removed in the operation or other procedure may be disposed of by and at the discretion of the [facility], [facility] and [facility] AT [facility]. 5.  I understand that in the event of a medical emergency, I will be transported by local paramedics to [facility] or other [facility] emergency department. 6.  I certify that I have read and fully understand the above consent to operation and/or other procedure. 7.  I acknowledge that my physician has explained sedation/analgesia administration to me including the risks and benefits. I consent to the administration of sedation/analgesia as may be necessary or desirable in the judgement of my physician. Witness signature: ___________________________________________________ Date:  ______/______/_____                    Time:  ________ A. M. P.M.       Patient Name:  ______________________________________________________  (please print)      Patient signature:  ___________________________________________________             Relationship to Patient:           []  Parent    Responsible person                          []  Spouse  In case of minor or                    [] Other  _____________   Incompetent name:  __________________________________________________                               (please print)      _____________      Responsible person  In case of minor or  Incompetent signature:  _______________________________________________    Statement of Physician  My signature below affirms that prior to the time of the procedure, I have explained to the patient and/or his/her guardian, the risks and benefits involved in the proposed treatment and any reasonable alternative to the proposed treatment. I have also explained the risks and benefits involved in the refusal of the proposed treatment and have answered the patient's questions.                         Date:  ______/______/_______  Provider                      Signature:  __________________________________________________________       Time:  ___________ A.M    P.M.

## (undated) NOTE — LETTER
[facility] SURGERY SCHEDULING FORM   [address] [place] [address]   [phone] (scheduling phone) [phone] (scheduling fax)     PATIENT INFORMATION   Last Name:      Author Ochoa      First Name:    [facility] Allergies: Patient has no known allergies.          Completed by:    Bennett Quiñonez      Date:    1/22/2020

## (undated) NOTE — LETTER
[address]   Date:   6/17/2021     Name:   Ludmila Campbell    YOB: 1955   MRN:   [id number]       WHERE IS YOUR PAIN NOW? Felipe the areas on your body where you feel the described sensations.   Use the appropriate

## (undated) NOTE — LETTER
[address]   Date:   9/23/2019     Name:   Vivek Cantor    YOB: 1955   MRN:   [id number]       WHERE IS YOUR PAIN NOW? Felipe the areas on your body where you feel the described sensations.   Use the appropriate

## (undated) NOTE — LETTER
AUTHORIZATION FOR SURGICAL OPERATION OR OTHER PROCEDURE    1. I hereby authorize Dr. Alsia Mccall, and [facility] staff assigned to my case to perform the following operation and/or procedure at the [facility]:    acupuncture    _______________________________________________________________________________________________      _______________________________________________________________________________________________    2. My physician has explained the nature and purpose of the operation or other procedure, possible alternative methods of treatment, the risks involved, and the possibility of complication to me. I acknowledge that no guarantee has been made as to the result that may be obtained. 3.  I recognize that, during the course of this operation, or other procedure, unforseen conditions may necessitate additional or different procedure than those listed above. I, therefore, further authorize and request that the above named physician, his/her physician assistants or designees perform such procedures as are, in his/her professional opinion, necessary and desirable. 4.  Any tissue or organs removed in the operation or other procedure may be disposed of by and at the discretion of the [facility] and [facility]. 5.  I understand that in the event of a medical emergency, I will be transported by local paramedics to [facility] or other [facility] emergency department. 6.  I certify that I have read and fully understand the above consent to operation and/or other procedure. 7.  I acknowledge that my physician has explained sedation/analgesia administration to me including the risks and benefits. I consent to the administration of sedation/analgesia as may be necessary or desirable in the judgement of my physician.     Witness signature: ___________________________________________________ Date:  ______/______/_____                    Time:  ________ A.M.  P.M. Patient Name:  ______________________________________________________  (please print)      Patient signature:  ___________________________________________________             Relationship to Patient:           []  Parent    Responsible person                          []  Spouse  In case of minor or                    [] Other  _____________   Incompetent name:  __________________________________________________                               (please print)      _____________      Responsible person  In case of minor or  Incompetent signature:  _______________________________________________    Statement of Physician  My signature below affirms that prior to the time of the procedure, I have explained to the patient and/or his/her guardian, the risks and benefits involved in the proposed treatment and any reasonable alternative to the proposed treatment. I have also explained the risks and benefits involved in the refusal of the proposed treatment and have answered the patient's questions.                         Date:  ______/______/_______  Provider                      Signature:  __________________________________________________________       Time:  ___________ A.M    P.M.

## (undated) NOTE — LETTER
[address], [facility]   Date:   2/22/2022     Name:   Cleopatra Murphy    YOB: 1955   MRN:   [id number]       WHERE IS YOUR PAIN NOW? Felipe the areas on your body where you feel the described sensations. Use the appropriate symbol. Ailyn Canal the areas of radiation. Include all affected areas. Just to complete the picture, please draw in the face. ACHE:  ^ ^ ^   NUMBNESS:  0000   PINS & NEEDLES:  = = = =                              ^ ^ ^                       0000              = = = =                                    ^ ^ ^                       0000            = = = =      BURNING:  XXXX   STABBING: ////                  XXXX                ////                         XXXX          ////     Please felipe the line below indicating your degree of pain right now  with 0 being no pain 10 being the worst pain possible.                                          0             1             2              3             4              5              6              7             8             9             10         Patient Signature:

## (undated) NOTE — LETTER
November 19, 2020    Sirena Franks, [facility]     Patient: Radha García   YOB: 1955   Date of Visit: 11/19/2020       Dear Dr. Robert Shelton MD:    Thank you for referring Emerald Trinh to me for evaluat Smoking status: Current Every Day Smoker        Packs/day: 0.50        Years: 40.00        Pack years: 20        Types: Cigarettes      Smokeless tobacco: Never Used    Alcohol use: Yes      Comment: 1 beer daily x20 years (as of 12/14/19)    Drug use: Dist ph sc 20/20 -1     Near cc 20/20 20/20          Tonometry ([facility], 3:43 PM)       Right Left    Pressure 17 18          Pupils       Pupils    Right PERRL    Left PERRL          Visual Fields       Left Right     Full Full          Extraocular Movemen Recommend bifocals for best corrected vision at distance and near. Family history of glaucoma in maternal grandmother  There is no evidence of glaucoma in this patient at this time.      Type 2 diabetes mellitus without retinopathy ([address]  Diabetes type

## (undated) NOTE — LETTER
[address]   Date:   3/11/2021     Name:   Radha García    YOB: 1955   MRN:   [id number]       WHERE IS YOUR PAIN NOW? Felipe the areas on your body where you feel the described sensations.   Use the appropriate

## (undated) NOTE — LETTER
OPAL Notifier: [employer] Patient Name: Candelario Louis Jr. Identification Number: [id number]                          Advance Beneficiary Notice of Noncoverage (ABN)   NOTE:  If Medicare doesn’t pay for D. item/service(s) below, you may have to pay.  Medicare does not pay for everything, even some care that you or your health care provider have good reason to think you need. We expect Medicare may not pay for the D. item/service(s) below.  D. Items or Services  Left trochanteric bursa steroid injection under US guidance  E. Reason Medicare May Not Pay: F. Estimated Cost   __ EKG ($87.00)  __ Pap smear ($101) __Pelvic/Breast ($147.00)  __ Ear Irrigation ($138)  _x_ Injection(s)  ___ Tdap ($181)       ___ Meningitis ($290)   __Prevnar ($555)  ___ Td ($66)              ___ Prevnar 20 ($549)  ___ Hep A ($152)     ___ Prolia ($1827)         __ Xiaflex ($            )   ___ Hep B ($150)     ___ Pneumovax ($287)                                         ___ Vaccine Administration ($65)   __ Medicare does not cover this service      __ Medicare may not pay for this   item/service for your condition     __ Medicare may not pay for this item/service as often as this        WHAT YOU NEED TO DO NOW:  Read this notice, so you can make an informed decision about your care.  Ask us any questions that you may have after you finish reading.  Choose an option below about whether to receive the D. item/service(s) listed above.  Note: If you choose Option 1 or 2, we may help you to use any other insurance that you might have, but Medicare cannot require us to do this.  G. OPTIONS: Check only one box.  We cannot choose a box for you.   OPTION 1. I want the D. item/service(s) listed above. You may ask to be paid now, but I also want Medicare billed for an official decision on payment, which is sent to me on a Medicare Summary Notice (MSN). I understand that if Medicare doesn’t pay, I am responsible for payment, but I  can appeal to Medicare by following the directions on the MSN. If Medicare does pay, you will refund any payments I made to you, less co-pays or deductibles.  OPTION 2. I want the D. item/service(s) listed above, but do not bill Medicare. You may ask to be paid now as I am responsible for payment. I cannot appeal if Medicare is not billed.  OPTION 3. I don't want the D. item/service(s) listed above. I understand with this choice I am not responsible for payment, and I cannot appeal to see if Medicare would pay.    H. Additional Information:    This notice gives our opinion, not an official Medicare decision. If you have other questions on this notice or Medicare billing, call 1-800-MEDICARE (1-634.181.9785/TTY: [phone]). Signing below means that you have received and understand this notice. You also receive a copy.  I. Signature: J. Date:       You have the right to get Medicare information in an accessible format, like large print, Braille, or audio. You also have the right to file a complaint if you feel you’ve been discriminated against. Visit Medicare.gov/about- us/okykmhghotyhj-qorvmgaojhiwuwwql-bmnsmo.  According to the Paperwork Reduction Act of 1995, no persons are required to respond to a collection of information unless it displays a valid OMB control number. The valid OMB control number for this information collection is 7645-7546. The time required to complete this information collection is estimated to average 7 minutes per response, including the time to review instructions, search existing data resources, gather the data needed, and complete and review the information collection. If you have comments concerning the accuracy of the time estimate or suggestions for improving this form, please write to: CMS, [facility] [employer]     Arely Attn: ZACHARY Reports Clearance Officer, [place], [place] [address].  Form CMS-R-131 (Exp. 1/31/2026) Form Approved OMB No. [id number]